# Patient Record
Sex: MALE | Race: BLACK OR AFRICAN AMERICAN | NOT HISPANIC OR LATINO | Employment: FULL TIME | URBAN - METROPOLITAN AREA
[De-identification: names, ages, dates, MRNs, and addresses within clinical notes are randomized per-mention and may not be internally consistent; named-entity substitution may affect disease eponyms.]

---

## 2017-03-08 ENCOUNTER — HOSPITAL ENCOUNTER (EMERGENCY)
Facility: HOSPITAL | Age: 52
Discharge: HOME | End: 2017-03-08
Attending: EMERGENCY MEDICINE
Payer: MEDICAID

## 2017-03-08 VITALS
SYSTOLIC BLOOD PRESSURE: 149 MMHG | WEIGHT: 225 LBS | OXYGEN SATURATION: 99 % | HEART RATE: 70 BPM | DIASTOLIC BLOOD PRESSURE: 89 MMHG | RESPIRATION RATE: 18 BRPM | HEIGHT: 73 IN | TEMPERATURE: 98 F | BODY MASS INDEX: 29.82 KG/M2

## 2017-03-08 DIAGNOSIS — E83.51 HYPOCALCEMIA: ICD-10-CM

## 2017-03-08 DIAGNOSIS — J06.9 VIRAL UPPER RESPIRATORY TRACT INFECTION WITH COUGH: Primary | ICD-10-CM

## 2017-03-08 DIAGNOSIS — A08.4 VIRAL GASTROENTERITIS: ICD-10-CM

## 2017-03-08 LAB
ALBUMIN SERPL BCP-MCNC: 4 G/DL
ALP SERPL-CCNC: 66 U/L
ALT SERPL W/O P-5'-P-CCNC: 22 U/L
ANION GAP SERPL CALC-SCNC: 7 MMOL/L
AST SERPL-CCNC: 29 U/L
BASOPHILS # BLD AUTO: 0.02 K/UL
BASOPHILS NFR BLD: 0.5 %
BILIRUB SERPL-MCNC: 0.7 MG/DL
BUN SERPL-MCNC: 15 MG/DL
CALCIUM SERPL-MCNC: 8.6 MG/DL
CHLORIDE SERPL-SCNC: 104 MMOL/L
CO2 SERPL-SCNC: 27 MMOL/L
CREAT SERPL-MCNC: 1.2 MG/DL
DIFFERENTIAL METHOD: ABNORMAL
EOSINOPHIL # BLD AUTO: 0 K/UL
EOSINOPHIL NFR BLD: 0.2 %
ERYTHROCYTE [DISTWIDTH] IN BLOOD BY AUTOMATED COUNT: 14.3 %
EST. GFR  (AFRICAN AMERICAN): >60 ML/MIN/1.73 M^2
EST. GFR  (NON AFRICAN AMERICAN): >60 ML/MIN/1.73 M^2
FLUAV AG SPEC QL IA: NEGATIVE
FLUBV AG SPEC QL IA: NEGATIVE
GLUCOSE SERPL-MCNC: 93 MG/DL
HCT VFR BLD AUTO: 39.6 %
HGB BLD-MCNC: 13.5 G/DL
LYMPHOCYTES # BLD AUTO: 0.9 K/UL
LYMPHOCYTES NFR BLD: 21.1 %
MCH RBC QN AUTO: 28 PG
MCHC RBC AUTO-ENTMCNC: 34.1 %
MCV RBC AUTO: 82 FL
MONOCYTES # BLD AUTO: 1.2 K/UL
MONOCYTES NFR BLD: 28.5 %
NEUTROPHILS # BLD AUTO: 2.1 K/UL
NEUTROPHILS NFR BLD: 49.5 %
PLATELET # BLD AUTO: 213 K/UL
PMV BLD AUTO: 11.4 FL
POTASSIUM SERPL-SCNC: 3.6 MMOL/L
PROT SERPL-MCNC: 6.8 G/DL
RBC # BLD AUTO: 4.82 M/UL
SODIUM SERPL-SCNC: 138 MMOL/L
SPECIMEN SOURCE: NORMAL
WBC # BLD AUTO: 4.21 K/UL

## 2017-03-08 PROCEDURE — 25000003 PHARM REV CODE 250: Performed by: PHYSICIAN ASSISTANT

## 2017-03-08 PROCEDURE — 99284 EMERGENCY DEPT VISIT MOD MDM: CPT

## 2017-03-08 PROCEDURE — 85025 COMPLETE CBC W/AUTO DIFF WBC: CPT

## 2017-03-08 PROCEDURE — 80053 COMPREHEN METABOLIC PANEL: CPT

## 2017-03-08 PROCEDURE — 87400 INFLUENZA A/B EACH AG IA: CPT | Mod: 59

## 2017-03-08 RX ORDER — NAPROXEN 500 MG/1
250 TABLET ORAL 2 TIMES DAILY WITH MEALS
Qty: 12 TABLET | Refills: 0 | Status: SHIPPED | OUTPATIENT
Start: 2017-03-08 | End: 2017-03-14

## 2017-03-08 RX ORDER — BENZONATATE 100 MG/1
100 CAPSULE ORAL
Status: COMPLETED | OUTPATIENT
Start: 2017-03-08 | End: 2017-03-08

## 2017-03-08 RX ORDER — ONDANSETRON 4 MG/1
4 TABLET, FILM COATED ORAL EVERY 6 HOURS PRN
Qty: 12 TABLET | Refills: 0 | Status: SHIPPED | OUTPATIENT
Start: 2017-03-08 | End: 2017-03-13

## 2017-03-08 RX ORDER — ONDANSETRON 4 MG/1
4 TABLET, ORALLY DISINTEGRATING ORAL
Status: COMPLETED | OUTPATIENT
Start: 2017-03-08 | End: 2017-03-08

## 2017-03-08 RX ORDER — IBUPROFEN 600 MG/1
600 TABLET ORAL
Status: COMPLETED | OUTPATIENT
Start: 2017-03-08 | End: 2017-03-08

## 2017-03-08 RX ORDER — ONDANSETRON 4 MG/1
4 TABLET, FILM COATED ORAL EVERY 6 HOURS PRN
Qty: 12 TABLET | Refills: 0 | Status: SHIPPED | OUTPATIENT
Start: 2017-03-08 | End: 2017-03-08 | Stop reason: CLARIF

## 2017-03-08 RX ORDER — BENZONATATE 100 MG/1
100 CAPSULE ORAL 3 TIMES DAILY PRN
Qty: 12 CAPSULE | Refills: 0 | Status: SHIPPED | OUTPATIENT
Start: 2017-03-08 | End: 2017-03-13

## 2017-03-08 RX ORDER — SODIUM CHLORIDE 9 MG/ML
1000 INJECTION, SOLUTION INTRAVENOUS
Status: COMPLETED | OUTPATIENT
Start: 2017-03-08 | End: 2017-03-08

## 2017-03-08 RX ADMIN — IBUPROFEN 600 MG: 600 TABLET, FILM COATED ORAL at 01:03

## 2017-03-08 RX ADMIN — BENZONATATE 100 MG: 100 CAPSULE ORAL at 02:03

## 2017-03-08 RX ADMIN — ONDANSETRON 4 MG: 4 TABLET, ORALLY DISINTEGRATING ORAL at 01:03

## 2017-03-08 RX ADMIN — SODIUM CHLORIDE 1000 ML: 0.9 INJECTION, SOLUTION INTRAVENOUS at 01:03

## 2017-03-08 NOTE — DISCHARGE INSTRUCTIONS
Viral Gastroenteritis (Adult)    Gastroenteritis is commonly called the stomach flu. It is most often caused by a virus that affects the stomach and intestinal tract and usually lasts from 2 to 7 days. Common viruses causing gastroenteritis include norovirus, rotavirus, and hepatitis A. Non-viral causes of gastroenteritis include bacteria, parasites, and toxins.  The danger from repeated vomiting or diarrhea is dehydration. This is the loss of too much fluid from the body. When this occurs, body fluids must be replaced. Antibiotics do not help with this illness because it is usually viral.Simple home treatment will be helpful.  Symptoms of viral gastroenteritis may include:  · Watery, loose stools  · Stomach pain or abdominal cramps  · Fever and chills  · Nausea and vomiting  · Loss of bowel control  · Headache  Home care  Gastroenteritis is transmitted by contact with the stool or vomit of an infected person. This can occur from person to person or from contact with a contaminated surface.  Follow these guidelines when caring for yourself at home:  · If symptoms are severe, rest at home for the next 24 hours or until you are feeling better.  · Wash your hands with soap and water or use alcohol-based  to prevent the spread of infection. Wash your hands after touching anyone who is sick.  · Wash your hands or use alcohol-based  after using the toilet and before meals. Clean the toilet after each use.  Remember these tips when preparing food:  · People with diarrhea should not prepare or serve food to others. When preparing foods, wash your hands before and after.  · Wash your hands after using cutting boards, countertops, knives, or utensils that have been in contact with raw food.  · Keep uncooked meats away from cooked and ready-to-eat foods.  Medicine  You may use acetaminophen or NSAID medicines like ibuprofen or naproxen to control fever unless another medicine was given. If you have  chronic liver or kidney disease, talk with your healthcare provider before using these medicines. Also talk with your provider if you've had a stomach ulcer or gastrointestinal bleeding. Don't give aspirin to anyone under 18 years of age who is ill with a fever. It may cause severe liver damage. Don't use NSAIDS is you are already taking one for another condition (like arthritis) or are on aspirin (such as for heart disease or after a stroke).  If medicine for vomiting or diarrhea are prescribed, take these only as directed. Do not take over-the-counter medicines for vomiting or diarrhea unless instructed by your healthcare provider.  Diet  Follow these guidelines for food:  · Water and liquids are important so you don't get dehydrated. Drink a small amount at a time or suck on ice chips if you are vomiting.  · If you eat, avoid fatty, greasy, spicy, or fried foods.  · Don't eat dairy if you have diarrhea. This can make diarrhea worse.  · Avoid tobacco, alcohol, and caffeine which may worsen symptoms.  During the first 24 hours (the first full day), follow the diet below:  · Beverages. Sports drinks, soft drinks without caffeine, ginger ale, mineral water (plain or flavored), decaffeinated tea and coffee. If you are very dehydrated, sports drinks aren't a good choice. They have too much sugar and not enough electrolytes. In this case, commercially available products called oral rehydration solutions, are best.  · Soups. Eat clear broth, consommé, and bouillon.  · Desserts. Eat gelatin, popsicles, and fruit juice bars.  During the next 24 hours (the second day), you may add the following to the above:  · Hot cereal, plain toast, bread, rolls, and crackers  · Plain noodles, rice, mashed potatoes, chicken noodle or rice soup  · Unsweetened canned fruit (avoid pineapple), bananas  · Limit fat intake to less than 15 grams per day. Do this by avoiding margarine, butter, oils, mayonnaise, sauces, gravies, fried foods,  peanut butter, meat, poultry, and fish.  · Limit fiber and avoid raw or cooked vegetables, fresh fruits (except bananas), and bran cereals.  · Limit caffeine and chocolate. Don't use spices or seasonings other than salt.  · Limit dairy products.  · Avoid alcohol.  During the next 24 hours:  · Gradually resume a normal diet as you feel better and your symptoms improve.  · If at any time it starts getting worse again, go back to clear liquids until you feel better.  Follow-up care  Follow up with your healthcare provider, or as advised. Call your provider if you don't get better within 24 hours or if diarrhea lasts more than a week. Also follow up if you are unable to keep down liquids and get dehydrated. If a stool (diarrhea) sample was taken, call as directed for the results.  Call 911  Call 911 if any of these occur:  · Trouble breathing  · Chest pain  · Confused  · Severe drowsiness or trouble awakening  · Fainting or loss of consciousness  · Rapid heart rate  · Seizure  · Stiff neck  When to seek medical advice  Call your healthcare provider right away if any of these occur:  · Abdominal pain that gets worse  · Continued vomiting (unable to keep liquids down)  · Frequent diarrhea (more than 5 times a day)  · Blood in vomit or stool (black or red color)  · Dark urine, reduced urine output, or extreme thirst  · Weakness or dizziness  · Drowsiness  · Fever of 100.4°F (38°C) oral or higher that does not get better with fever medicine  · New rash  Date Last Reviewed: 1/3/2016  © 8416-0242 [employer]. [address] [place] [address], [place] [address]. All rights reserved. This information is not intended as a substitute for professional medical care. Always follow your healthcare professional's instructions.          Viral Upper Respiratory Illness (Adult)  You have a viral upper respiratory illness (URI), which is another term for the common cold. This illness is contagious during the first few days. It is  spread through the air by coughing and sneezing. It may also be spread by direct contact (touching the sick person and then touching your own eyes, nose, or mouth). Frequent handwashing will decrease risk of spread. Most viral illnesses go away within 7 to 10 days with rest and simple home remedies. Sometimes the illness may last for several weeks. Antibiotics will not kill a virus, and they are generally not prescribed for this condition.    Home care  · If symptoms are severe, rest at home for the first 2 to 3 days. When you resume activity, don't let yourself get too tired.  · Avoid being exposed to cigarette smoke (yours or others).  · You may use acetaminophen or ibuprofen to control pain and fever, unless another medicine was prescribed. (Note: If you have chronic liver or kidney disease, have ever had a stomach ulcer or gastrointestinal bleeding, or are taking blood-thinning medicines, talk with your healthcare provider before using these medicines.) Aspirin should never be given to anyone under 18 years of age who is ill with a viral infection or fever. It may cause severe liver or brain damage.  · Your appetite may be poor, so a light diet is fine. Avoid dehydration by drinking 6 to 8 glasses of fluids per day (water, soft drinks, juices, tea, or soup). Extra fluids will help loosen secretions in the nose and lungs.  · Over-the-counter cold medicines will not shorten the length of time youre sick, but they may be helpful for the following symptoms: cough, sore throat, and nasal and sinus congestion. (Note: Do not use decongestants if you have high blood pressure.)  Follow-up care  Follow up with your healthcare provider, or as advised.  When to seek medical advice  Call your healthcare provider right away if any of these occur:  · Cough with lots of colored sputum (mucus)  · Severe headache; face, neck, or ear pain  · Difficulty swallowing due to throat pain  · Fever of 100.4°F (38°C)  Call 911, or get  immediate medical care  Call emergency services right away if any of these occur:  · Chest pain, shortness of breath, wheezing, or difficulty breathing  · Coughing up blood  · Inability to swallow due to throat pain  Date Last Reviewed: 9/13/2015 © 2000-2016 [email or web address]. [address] [place] [id number]. All rights reserved. This information is not intended as a substitute for professional medical care. Always follow your healthcare professional's instructions.        Hypocalcemia (Adult)  Hypocalcemia is too little calcium in the blood. Calcium is a mineral. It helps the heart and other muscles work well. Its also needed to grow and maintain strong bones and teeth.  Hypocalcemia may be caused by:  · Lack of calcium or vitamin D in your diet  · Digestive problems  · Gland problems  · Kidney or pancreas disease  · Low magnesium levels  · Too much phosphate in your blood  · Certain medicines  Hypocalcemia can cause the muscles of the face, hands, and feet to twitch without your control (spasm). It can also cause numbness or tingling around your mouth or in your hands and feet. Other problems may include depression and memory loss.  A blood sample will be taken to check your calcium level. The test also helps figure out if hypocalcemia may be caused by a problem with your kidneys, or with the gland that controls your calcium level (parathyroid gland). Depending on the cause, you may be given an oral calcium supplement. In severe cases, you may need a shot (injection) of calcium gluconate. You may also have a vitamin D shot or supplement. If low magnesium is the cause, you will have treatment to raise your bodys level of this mineral.  Home care  Your healthcare provider may have you take calcium and vitamin D supplements, or other medicines or minerals. Follow your providers instructions for taking these supplements.  General care  · Take any medicines or supplements as directed.  · Make diet  changes as instructed by your provider. You may be asked to eat more dairy products like milk, cheese, and yogurt.  · Avoid drinking sodas. Many of these have phosphates. These can interfere with your ability to absorb calcium.   · Try to get out in the sun for at least 20 minutes each day. Sun exposure helps your body make vitamin D. This helps you absorb calcium.  Follow-up care  Follow up as advised by your healthcare provider, or as advised.  When to seek medical advice  Call your healthcare provider right away if any of these occur:  · Extreme tiredness (fatigue)  · Irregular heartbeat  · Depression  · Seeing or hearing things that arent there (hallucinations)  · Muscle cramps, spasms, or twitching  · Numbness and tingling in the arms, legs, hands, or feet  · Seizures  Date Last Reviewed: 8/1/2016  © 1193-1499 [employer]. [address] [place] [address], [place] [address]. All rights reserved. This information is not intended as a substitute for professional medical care. Always follow your healthcare professional's instructions.

## 2017-03-08 NOTE — ED PROVIDER NOTES
Encounter Date: 3/8/2017    SCRIBE #1 NOTE: IRayray, am scribing for, and in the presence of,  Shannan Gonzalez PA-C . I have scribed the following portions of the note - Other sections scribed: HPI and ROS.       History     Chief Complaint   Patient presents with    Generalized Body Aches     Pt reports generalized body aches  and fever x 2 days.      Review of patient's allergies indicates:  No Known Allergies  HPI Comments: CC: Generalized Body Aches        HPI: This 51 y.o male pt with a medical Hx of HTN presents to the ED with c/o a x2 day history of flu like symptoms consisting of generalized body aches and weakness, subjective fever, chills, diaphoresis, headache, non-productive cough, emesis, and diarrhea. Additionally, pt complains of moderate bilateral lower quadrant abdominal pain present with emesis and subsiding afterwards. Pain is described as a crampy sensation. Pt denies sore throat, rhinorrhea, and congestion. Pt reports taking Nyquil and mucinex, without improvement. Pt has no known allergies. There are no alleviating factors. Symptoms are acute and severe(10/10).    The history is provided by the patient. No  was used.     Past Medical History:   Diagnosis Date    HTN (hypertension) 5/9/2013 2005    LBP radiating to right leg 5/9/2013    Smokes < 1 pack of cigarettes per day 5/9/2013    1/2 ppd     Past Surgical History:   Procedure Laterality Date    BACK SURGERY      c-spine fussion  2008     Family History   Problem Relation Age of Onset    Hypertension Mother      Social History   Substance Use Topics    Smoking status: Current Every Day Smoker     Packs/day: 1.00     Types: Cigarettes    Smokeless tobacco: None    Alcohol use No     Review of Systems   Constitutional: Positive for chills, diaphoresis and fever.   HENT: Negative for congestion, ear pain, rhinorrhea and sore throat.    Eyes: Negative for pain.   Respiratory: Positive for cough.  Negative for shortness of breath.    Cardiovascular: Negative for chest pain.   Gastrointestinal: Positive for abdominal pain (lower), diarrhea and vomiting. Negative for constipation and nausea.   Genitourinary: Negative for dysuria.   Musculoskeletal: Negative for back pain.   Skin: Negative for rash.   Neurological: Positive for weakness and headaches. Negative for dizziness and light-headedness.   Hematological: Does not bruise/bleed easily.       Physical Exam   Initial Vitals   BP Pulse Resp Temp SpO2   03/08/17 1134 03/08/17 1134 03/08/17 1134 03/08/17 1134 03/08/17 1134   166/89 89 18 99.7 °F (37.6 °C) 98 %     Physical Exam    Nursing note and vitals reviewed.  Constitutional: He appears well-developed and well-nourished. No distress.   HENT:   Head: Normocephalic and atraumatic.   Right Ear: External ear normal.   Left Ear: External ear normal.   Nose: Nose normal. Right sinus exhibits no maxillary sinus tenderness and no frontal sinus tenderness. Left sinus exhibits no maxillary sinus tenderness and no frontal sinus tenderness.   Mouth/Throat: Oropharynx is clear and moist. Mucous membranes are dry. No oropharyngeal exudate, posterior oropharyngeal edema or posterior oropharyngeal erythema.   +chapped lips   Eyes: Conjunctivae are normal.   Neck: Normal range of motion.   Cardiovascular: Normal rate and regular rhythm.   Pulmonary/Chest: Breath sounds normal. He has no wheezes. He has no rhonchi. He has no rales.   Abdominal: Soft. Bowel sounds are normal. He exhibits no distension. There is no tenderness. There is no rebound, no guarding, no tenderness at McBurney's point and negative Luz's sign.   Lymphadenopathy:     He has cervical adenopathy.   Neurological: He is alert.   Skin: Skin is warm and dry.   Psychiatric: He has a normal mood and affect.         ED Course   Procedures  Labs Reviewed   CBC W/ AUTO DIFFERENTIAL - Abnormal; Notable for the following:        Result Value    Hemoglobin 13.5  (*)     Hematocrit 39.6 (*)     Lymph # 0.9 (*)     Mono # 1.2 (*)     Mono% 28.5 (*)     All other components within normal limits   COMPREHENSIVE METABOLIC PANEL - Abnormal; Notable for the following:     Calcium 8.6 (*)     Anion Gap 7 (*)     All other components within normal limits   INFLUENZA A AND B ANTIGEN             Medical Decision Making:   Initial Assessment:   Patient is a 51-year-old male who presents for evaluation of symptoms for 2 days. Patient is afebrile in no acute distress, ill appearing. Vital signs stable. On exam, no abdominal tenderness or peritoneal signs. Negative mcburneys. Lungs clear to auscultation bilaterally. No meningeal signs. No sinus tenderness. Pt appears mildly dehydrated. Pt given IVF, tessalon, zofran and ibuprofen in ED. Flu swab negative. CBC remarkable for mild anemia. CMP remarkable for mild hypocalcemia. Upon re-evaluation, pt appearance is much improved and he states he feels much better. Pt discharged to home with symptomatic treatment-tessalon, zofran, and naproxen for viral syndrome. Supportive care and provided discharge instructions for hypocalcemia. PCP follow up. Return to ER if symptoms worsen, if cannot tolerate PO, if develop worsening abdominal pain, blood in vomit or stool or as needed.     I discussed this pt with Dr. Leslie who agrees with assessment and plan.   Differential Diagnosis:   Flu, Viral URI, Viral gastroenteritis, appendicitis, meningitis, PNA, sinusitis            Scribe Attestation:   Scribe #1: I performed the above scribed service and the documentation accurately describes the services I performed. I attest to the accuracy of the note.    Attending Attestation:           Physician Attestation for Scribe:  Physician Attestation Statement for Scribe #1: I, Shannan Gonzalez PA-C, reviewed documentation, as scribed by Rayray Nelson in my presence, and it is both accurate and complete.                 ED Course     Clinical Impression:   The  primary encounter diagnosis was Viral upper respiratory tract infection with cough. Diagnoses of Viral gastroenteritis and Hypocalcemia were also pertinent to this visit.    Disposition:   Disposition: Discharged  Condition: Stable       Shannan Gonzalez PA-C  03/08/17 [id number]

## 2017-03-08 NOTE — ED AVS SNAPSHOT
[facility]  [address] [place] [address]               Ramiro Ca   3/8/2017 11:51 AM   ED    Description:  Male : 1965   Department:  [facility]           Your Care was Coordinated By:     Provider Role From To    Vinicius Leslie MD Attending Provider [phone] --    Shannan Gonzalez PA-C Physician Assistant 17 [phone] --      Reason for Visit     Generalized Body Aches           Diagnoses this Visit        Comments    Viral upper respiratory tract infection with cough    -  Primary     Viral gastroenteritis         Hypocalcemia           ED Disposition     ED Disposition Condition Comment    Discharge             To Do List           Follow-up Information     Follow up with ITA Carpenter MD. Schedule an appointment as soon as possible for a visit in 2 days.    Specialty:  Internal Medicine    Why:  for follow up    Contact information:    [address]  [address]  [facility] [address]  [phone]          Go to [facility].    Specialty:  Emergency Medicine    Why:  As needed, If symptoms worsen    Contact information:    [address] [place] [id number] [phone]       These Medications        Disp Refills Start End    benzonatate (TESSALON) 100 MG capsule 12 capsule 0 3/8/2017 3/13/2017    Take 1 capsule (100 mg total) by mouth 3 (three) times daily as needed for Cough. - Oral    Pharmacy: [id number] - [place], [place] - [address] Ph #: [phone]       naproxen (NAPROSYN) 500 MG tablet 12 tablet 0 3/8/2017 3/14/2017    Take 0.5 tablets (250 mg total) by mouth 2 (two) times daily with meals. - Oral    Pharmacy: [id number] - [place], [place] - [address] Ph #: [phone]       ondansetron (ZOFRAN) 4 MG tablet 12 tablet 0 3/8/2017 3/13/2017    Take 1 tablet (4 mg total) by mouth every 6 (six) hours as needed. - Oral    Pharmacy: [facility]  [address] [address] [address]  #: [phone]         [facility]     [facility] - 24/7 Assistance  Registered nurses in the [facility] provide clinical advisement, health education, appointment booking, and other advisory services.  Call for this free service at [phone].             Medications           Message regarding Medications     Verify the changes and/or additions to your medication regime listed below are the same as discussed with your clinician today.  If any of these changes or additions are incorrect, please notify your healthcare provider.        START taking these NEW medications        Refills    benzonatate (TESSALON) 100 MG capsule 0    Sig: Take 1 capsule (100 mg total) by mouth 3 (three) times daily as needed for Cough.    Class: Print    Route: Oral    naproxen (NAPROSYN) 500 MG tablet 0    Sig: Take 0.5 tablets (250 mg total) by mouth 2 (two) times daily with meals.    Class: Print    Route: Oral    ondansetron (ZOFRAN) 4 MG tablet 0    Sig: Take 1 tablet (4 mg total) by mouth every 6 (six) hours as needed.    Class: Print    Route: Oral      These medications were administered today        Dose Freq    0.9%  NaCl infusion 1,000 mL ED 1 Time    Sig: Inject 1,000 mLs into the vein ED 1 Time.    Class: Normal    Route: Intravenous    Cosign for Ordering: Required by Vinicius Leslie MD    ondansetron disintegrating tablet 4 mg 4 mg ED 1 Time    Sig: Take 1 tablet (4 mg total) by mouth ED 1 Time.    Class: Normal    Route: Oral    Cosign for Ordering: Required by Vinicius Leslie MD    benzonatate capsule 100 mg 100 mg ED 1 Time    Sig: Take 1 capsule (100 mg total) by mouth ED 1 Time.    Class: Normal    Route: Oral    Cosign for Ordering: Required by Vinicius Leslie MD    ibuprofen tablet 600 mg 600 mg ED 1 Time    Sig: Take 1 tablet (600 mg total) by mouth ED 1 Time.    Class: Normal    Route: Oral    Cosign for Ordering: Required by Vinicius NORIEGA  "MD Mariama      STOP taking these medications     cyclobenzaprine (FLEXERIL) 10 MG tablet Take 5 mg by mouth 3 (three) times daily as needed.    hydrocodone-acetaminophen (VICODIN) 5-500 mg per tablet Take 1 tablet by mouth nightly as needed.    lisinopril-hydrochlorothiazide (PRINZIDE,ZESTORETIC) 20-25 mg Tab Take 1 tablet by mouth once daily.    pantoprazole (PROTONIX) 40 MG tablet Take 40 mg by mouth once daily.    varenicline (CHANTIX) 1 mg Tab Take 1 mg by mouth 2 (two) times daily.           Verify that the below list of medications is an accurate representation of the medications you are currently taking.  If none reported, the list may be blank. If incorrect, please contact your healthcare provider. Carry this list with you in case of emergency.           Current Medications     benzonatate (TESSALON) 100 MG capsule Take 1 capsule (100 mg total) by mouth 3 (three) times daily as needed for Cough.    naproxen (NAPROSYN) 500 MG tablet Take 0.5 tablets (250 mg total) by mouth 2 (two) times daily with meals.    ondansetron (ZOFRAN) 4 MG tablet Take 1 tablet (4 mg total) by mouth every 6 (six) hours as needed.           Clinical Reference Information           Your Vitals Were     BP Pulse Temp Resp Height Weight    143/80 (BP Location: Right arm, Patient Position: Sitting, BP Method: Automatic) 76 99.2 °F (37.3 °C) (Oral) 18 6' 1" (1.854 m) 102.1 kg (225 lb)    SpO2 BMI             95% 29.69 kg/m2         Allergies as of 3/8/2017     No Known Allergies      Immunizations Administered on Date of Encounter - 3/8/2017     None      ED Micro, Lab, POCT     Start Ordered       Status Ordering Provider    03/08/17 1259 03/08/17 1258  CBC auto differential  STAT      Final result     03/08/17 1259 03/08/17 1258  Comprehensive metabolic panel  STAT      Final result     03/08/17 1259 03/08/17 1258  Influenza antigen Nasopharyngeal Swab  STAT      Final result       ED Imaging Orders     None        Discharge Instructions  "          Viral Gastroenteritis (Adult)    Gastroenteritis is commonly called the stomach flu. It is most often caused by a virus that affects the stomach and intestinal tract and usually lasts from 2 to 7 days. Common viruses causing gastroenteritis include norovirus, rotavirus, and hepatitis A. Non-viral causes of gastroenteritis include bacteria, parasites, and toxins.  The danger from repeated vomiting or diarrhea is dehydration. This is the loss of too much fluid from the body. When this occurs, body fluids must be replaced. Antibiotics do not help with this illness because it is usually viral.Simple home treatment will be helpful.  Symptoms of viral gastroenteritis may include:  · Watery, loose stools  · Stomach pain or abdominal cramps  · Fever and chills  · Nausea and vomiting  · Loss of bowel control  · Headache  Home care  Gastroenteritis is transmitted by contact with the stool or vomit of an infected person. This can occur from person to person or from contact with a contaminated surface.  Follow these guidelines when caring for yourself at home:  · If symptoms are severe, rest at home for the next 24 hours or until you are feeling better.  · Wash your hands with soap and water or use alcohol-based  to prevent the spread of infection. Wash your hands after touching anyone who is sick.  · Wash your hands or use alcohol-based  after using the toilet and before meals. Clean the toilet after each use.  Remember these tips when preparing food:  · People with diarrhea should not prepare or serve food to others. When preparing foods, wash your hands before and after.  · Wash your hands after using cutting boards, countertops, knives, or utensils that have been in contact with raw food.  · Keep uncooked meats away from cooked and ready-to-eat foods.  Medicine  You may use acetaminophen or NSAID medicines like ibuprofen or naproxen to control fever unless another medicine was given. If you have  chronic liver or kidney disease, talk with your healthcare provider before using these medicines. Also talk with your provider if you've had a stomach ulcer or gastrointestinal bleeding. Don't give aspirin to anyone under 18 years of age who is ill with a fever. It may cause severe liver damage. Don't use NSAIDS is you are already taking one for another condition (like arthritis) or are on aspirin (such as for heart disease or after a stroke).  If medicine for vomiting or diarrhea are prescribed, take these only as directed. Do not take over-the-counter medicines for vomiting or diarrhea unless instructed by your healthcare provider.  Diet  Follow these guidelines for food:  · Water and liquids are important so you don't get dehydrated. Drink a small amount at a time or suck on ice chips if you are vomiting.  · If you eat, avoid fatty, greasy, spicy, or fried foods.  · Don't eat dairy if you have diarrhea. This can make diarrhea worse.  · Avoid tobacco, alcohol, and caffeine which may worsen symptoms.  During the first 24 hours (the first full day), follow the diet below:  · Beverages. Sports drinks, soft drinks without caffeine, ginger ale, mineral water (plain or flavored), decaffeinated tea and coffee. If you are very dehydrated, sports drinks aren't a good choice. They have too much sugar and not enough electrolytes. In this case, commercially available products called oral rehydration solutions, are best.  · Soups. Eat clear broth, consommé, and bouillon.  · Desserts. Eat gelatin, popsicles, and fruit juice bars.  During the next 24 hours (the second day), you may add the following to the above:  · Hot cereal, plain toast, bread, rolls, and crackers  · Plain noodles, rice, mashed potatoes, chicken noodle or rice soup  · Unsweetened canned fruit (avoid pineapple), bananas  · Limit fat intake to less than 15 grams per day. Do this by avoiding margarine, butter, oils, mayonnaise, sauces, gravies, fried foods,  peanut butter, meat, poultry, and fish.  · Limit fiber and avoid raw or cooked vegetables, fresh fruits (except bananas), and bran cereals.  · Limit caffeine and chocolate. Don't use spices or seasonings other than salt.  · Limit dairy products.  · Avoid alcohol.  During the next 24 hours:  · Gradually resume a normal diet as you feel better and your symptoms improve.  · If at any time it starts getting worse again, go back to clear liquids until you feel better.  Follow-up care  Follow up with your healthcare provider, or as advised. Call your provider if you don't get better within 24 hours or if diarrhea lasts more than a week. Also follow up if you are unable to keep down liquids and get dehydrated. If a stool (diarrhea) sample was taken, call as directed for the results.  Call 911  Call 911 if any of these occur:  · Trouble breathing  · Chest pain  · Confused  · Severe drowsiness or trouble awakening  · Fainting or loss of consciousness  · Rapid heart rate  · Seizure  · Stiff neck  When to seek medical advice  Call your healthcare provider right away if any of these occur:  · Abdominal pain that gets worse  · Continued vomiting (unable to keep liquids down)  · Frequent diarrhea (more than 5 times a day)  · Blood in vomit or stool (black or red color)  · Dark urine, reduced urine output, or extreme thirst  · Weakness or dizziness  · Drowsiness  · Fever of 100.4°F (38°C) oral or higher that does not get better with fever medicine  · New rash  Date Last Reviewed: 1/3/2016  © 8171-1731 [employer]. [address] [place] [address], [place] [address]. All rights reserved. This information is not intended as a substitute for professional medical care. Always follow your healthcare professional's instructions.          Viral Upper Respiratory Illness (Adult)  You have a viral upper respiratory illness (URI), which is another term for the common cold. This illness is contagious during the first few days. It is  spread through the air by coughing and sneezing. It may also be spread by direct contact (touching the sick person and then touching your own eyes, nose, or mouth). Frequent handwashing will decrease risk of spread. Most viral illnesses go away within 7 to 10 days with rest and simple home remedies. Sometimes the illness may last for several weeks. Antibiotics will not kill a virus, and they are generally not prescribed for this condition.    Home care  · If symptoms are severe, rest at home for the first 2 to 3 days. When you resume activity, don't let yourself get too tired.  · Avoid being exposed to cigarette smoke (yours or others).  · You may use acetaminophen or ibuprofen to control pain and fever, unless another medicine was prescribed. (Note: If you have chronic liver or kidney disease, have ever had a stomach ulcer or gastrointestinal bleeding, or are taking blood-thinning medicines, talk with your healthcare provider before using these medicines.) Aspirin should never be given to anyone under 18 years of age who is ill with a viral infection or fever. It may cause severe liver or brain damage.  · Your appetite may be poor, so a light diet is fine. Avoid dehydration by drinking 6 to 8 glasses of fluids per day (water, soft drinks, juices, tea, or soup). Extra fluids will help loosen secretions in the nose and lungs.  · Over-the-counter cold medicines will not shorten the length of time youre sick, but they may be helpful for the following symptoms: cough, sore throat, and nasal and sinus congestion. (Note: Do not use decongestants if you have high blood pressure.)  Follow-up care  Follow up with your healthcare provider, or as advised.  When to seek medical advice  Call your healthcare provider right away if any of these occur:  · Cough with lots of colored sputum (mucus)  · Severe headache; face, neck, or ear pain  · Difficulty swallowing due to throat pain  · Fever of 100.4°F (38°C)  Call 911, or get  immediate medical care  Call emergency services right away if any of these occur:  · Chest pain, shortness of breath, wheezing, or difficulty breathing  · Coughing up blood  · Inability to swallow due to throat pain  Date Last Reviewed: 9/13/2015 © 2000-2016 [employer]. [address] [place] [address]. All rights reserved. This information is not intended as a substitute for professional medical care. Always follow your healthcare professional's instructions.        Hypocalcemia (Adult)  Hypocalcemia is too little calcium in the blood. Calcium is a mineral. It helps the heart and other muscles work well. Its also needed to grow and maintain strong bones and teeth.  Hypocalcemia may be caused by:  · Lack of calcium or vitamin D in your diet  · Digestive problems  · Gland problems  · Kidney or pancreas disease  · Low magnesium levels  · Too much phosphate in your blood  · Certain medicines  Hypocalcemia can cause the muscles of the face, hands, and feet to twitch without your control (spasm). It can also cause numbness or tingling around your mouth or in your hands and feet. Other problems may include depression and memory loss.  A blood sample will be taken to check your calcium level. The test also helps figure out if hypocalcemia may be caused by a problem with your kidneys, or with the gland that controls your calcium level (parathyroid gland). Depending on the cause, you may be given an oral calcium supplement. In severe cases, you may need a shot (injection) of calcium gluconate. You may also have a vitamin D shot or supplement. If low magnesium is the cause, you will have treatment to raise your bodys level of this mineral.  Home care  Your healthcare provider may have you take calcium and vitamin D supplements, or other medicines or minerals. Follow your providers instructions for taking these supplements.  General care  · Take any medicines or supplements as directed.  · Make diet  changes as instructed by your provider. You may be asked to eat more dairy products like milk, cheese, and yogurt.  · Avoid drinking sodas. Many of these have phosphates. These can interfere with your ability to absorb calcium.   · Try to get out in the sun for at least 20 minutes each day. Sun exposure helps your body make vitamin D. This helps you absorb calcium.  Follow-up care  Follow up as advised by your healthcare provider, or as advised.  When to seek medical advice  Call your healthcare provider right away if any of these occur:  · Extreme tiredness (fatigue)  · Irregular heartbeat  · Depression  · Seeing or hearing things that arent there (hallucinations)  · Muscle cramps, spasms, or twitching  · Numbness and tingling in the arms, legs, hands, or feet  · Seizures  Date Last Reviewed: 8/1/2016  © 8910-6086 [employer]. [address] [place] [address]. All rights reserved. This information is not intended as a substitute for professional medical care. Always follow your healthcare professional's instructions.          [facility] Sign-Up     Activating your [facility] account is as easy as 1-2-3!     1) Visit [email or web address], select Sign Up Now, enter this activation code and your date of birth, then select Next.  [email or web address]  Expires: 4/22/2017  2:47 PM      2) Create a username and password to use when you visit [facility] in the future and select a security question in case you lose your password and select Next.    3) Enter your e-mail address and click Sign Up!    Additional Information  If you have questions, please e-mail [email or web address] or call [phone] to talk to our [facility] staff. Remember, [facility] is NOT to be used for urgent needs. For medical emergencies, dial 911.          [facility] complies with applicable Federal civil rights laws and does not discriminate on the basis of race, color, national origin, age, disability, or sex.         Language Assistance Services     ATTENTION: Language assistance services are available, free of charge. Please call [phone].      ATENCIÓN: Si habla christiañol, tiene a chaparro disposición servicios gratuitos de asistencia lingüística. Llame al [phone].     CHÚ Ý: N?u b?n nói Ti?ng Vi?t, có các d?ch v? h? tr? ngôn ng? mi?n phí dành cho b?n. G?i s? [phone].

## 2017-03-08 NOTE — ED TRIAGE NOTES
C/o body aches, vomiting, diarrhea,  chills, HA, sore throat, runny nose, cough, abd pain x 1 day.  No vomiting today. Diarrhea x 1 today. No OTC meds taken.

## 2017-04-29 ENCOUNTER — HOSPITAL ENCOUNTER (EMERGENCY)
Facility: OTHER | Age: 52
Discharge: HOME | End: 2017-04-29
Attending: INTERNAL MEDICINE
Payer: MEDICAID

## 2017-04-29 VITALS
HEIGHT: 73 IN | DIASTOLIC BLOOD PRESSURE: 102 MMHG | BODY MASS INDEX: 29.16 KG/M2 | SYSTOLIC BLOOD PRESSURE: 172 MMHG | WEIGHT: 220 LBS | RESPIRATION RATE: 22 BRPM | OXYGEN SATURATION: 98 % | HEART RATE: 81 BPM | TEMPERATURE: 98 F

## 2017-04-29 DIAGNOSIS — I10 ESSENTIAL HYPERTENSION: ICD-10-CM

## 2017-04-29 DIAGNOSIS — S16.1XXA CERVICAL STRAIN, INITIAL ENCOUNTER: Primary | ICD-10-CM

## 2017-04-29 PROCEDURE — 25000003 PHARM REV CODE 250: Performed by: INTERNAL MEDICINE

## 2017-04-29 PROCEDURE — 99283 EMERGENCY DEPT VISIT LOW MDM: CPT

## 2017-04-29 RX ORDER — LISINOPRIL 20 MG/1
20 TABLET ORAL DAILY
Qty: 90 TABLET | Refills: 3 | Status: SHIPPED | OUTPATIENT
Start: 2017-04-29 | End: 2021-05-17 | Stop reason: SDUPTHER

## 2017-04-29 RX ORDER — IBUPROFEN 800 MG/1
800 TABLET ORAL EVERY 8 HOURS PRN
Qty: 20 TABLET | Refills: 0 | Status: SHIPPED | OUTPATIENT
Start: 2017-04-29 | End: 2017-04-29

## 2017-04-29 RX ORDER — LISINOPRIL 20 MG/1
40 TABLET ORAL
Status: COMPLETED | OUTPATIENT
Start: 2017-04-29 | End: 2017-04-29

## 2017-04-29 RX ORDER — IBUPROFEN 800 MG/1
800 TABLET ORAL EVERY 8 HOURS PRN
Qty: 20 TABLET | Refills: 0 | Status: SHIPPED | OUTPATIENT
Start: 2017-04-29 | End: 2018-12-21

## 2017-04-29 RX ORDER — CLONIDINE HYDROCHLORIDE 0.1 MG/1
0.2 TABLET ORAL
Status: COMPLETED | OUTPATIENT
Start: 2017-04-29 | End: 2017-04-29

## 2017-04-29 RX ORDER — LISINOPRIL 20 MG/1
20 TABLET ORAL DAILY
Qty: 90 TABLET | Refills: 3 | Status: SHIPPED | OUTPATIENT
Start: 2017-04-29 | End: 2017-04-29

## 2017-04-29 RX ORDER — IBUPROFEN 400 MG/1
800 TABLET ORAL
Status: COMPLETED | OUTPATIENT
Start: 2017-04-29 | End: 2017-04-29

## 2017-04-29 RX ADMIN — LISINOPRIL 40 MG: 20 TABLET ORAL at 09:04

## 2017-04-29 RX ADMIN — CLONIDINE HYDROCHLORIDE 0.2 MG: 0.1 TABLET ORAL at 09:04

## 2017-04-29 RX ADMIN — IBUPROFEN 800 MG: 400 TABLET, FILM COATED ORAL at 09:04

## 2017-04-29 NOTE — ED AVS SNAPSHOT
[facility] EMERGENCY DEPARTMENT  [address] [place] [address]               Ramiro Ca   2017  9:13 PM   ED    Description:  Male : 1965   Department:  [facility] Emergency Department           Your Care was Coordinated By:     Provider Role From To    Manav Carpenter MD Attending Provider [phone] --      Reason for Visit     Neck Pain           Diagnoses this Visit        Comments    Cervical strain, initial encounter    -  Primary     Essential hypertension           ED Disposition     ED Disposition Condition Comment    Discharge             To Do List           Follow-up Information     Follow up with ITA Carpenter MD In 3 day(s).    Specialty:  Internal Medicine    Contact information:    [address] [facility]  [address]  [facility] [address]  [phone]         These Medications        Disp Refills Start End    ibuprofen (ADVIL,MOTRIN) 800 MG tablet 20 tablet 0 2017     Take 1 tablet (800 mg total) by mouth every 8 (eight) hours as needed for Pain. - Oral    Pharmacy: [id number] - [place], [place] - [address] Ph #: [phone]       lisinopril (PRINIVIL,ZESTRIL) 20 MG tablet 90 tablet 3 2017    Take 1 tablet (20 mg total) by mouth once daily. - Oral    Pharmacy: [facility] - [place], [place] - [address] Ph #: [phone]         [facility]     [facility] Care [facility] - 24/7 Assistance  Unless otherwise directed by your provider, please contact [facility], our nurse care line that is available for 24/7 assistance.     Registered nurses in the [facility] provide: appointment scheduling, clinical advisement, health education, and other advisory services.  Call: [phone] (toll free)               Medications           Message regarding Medications     Verify the changes and/or additions to your medication regime listed below are the same as discussed with your clinician today.   "If any of these changes or additions are incorrect, please notify your healthcare provider.        START taking these NEW medications        Refills    ibuprofen (ADVIL,MOTRIN) 800 MG tablet 0    Sig: Take 1 tablet (800 mg total) by mouth every 8 (eight) hours as needed for Pain.    Class: Normal    Route: Oral    lisinopril (PRINIVIL,ZESTRIL) 20 MG tablet 3    Sig: Take 1 tablet (20 mg total) by mouth once daily.    Class: Normal    Route: Oral      These medications were administered today        Dose Freq    cloNIDine tablet 0.2 mg 0.2 mg ED 1 Time    Sig: Take 2 tablets (0.2 mg total) by mouth ED 1 Time.    Class: Normal    Route: Oral    lisinopril tablet 40 mg 40 mg ED 1 Time    Sig: Take 2 tablets (40 mg total) by mouth ED 1 Time.    Class: Normal    Route: Oral    ibuprofen tablet 800 mg 800 mg ED 1 Time    Sig: Take 2 tablets (800 mg total) by mouth ED 1 Time.    Class: Normal    Route: Oral           Verify that the below list of medications is an accurate representation of the medications you are currently taking.  If none reported, the list may be blank. If incorrect, please contact your healthcare provider. Carry this list with you in case of emergency.           Current Medications     ibuprofen (ADVIL,MOTRIN) 800 MG tablet Take 1 tablet (800 mg total) by mouth every 8 (eight) hours as needed for Pain.    lisinopril (PRINIVIL,ZESTRIL) 20 MG tablet Take 1 tablet (20 mg total) by mouth once daily.    lisinopril tablet 40 mg Take 2 tablets (40 mg total) by mouth ED 1 Time.           Clinical Reference Information           Your Vitals Were     BP Pulse Temp Resp Height Weight    178/121 (BP Location: Left arm, Patient Position: Sitting) 81 98.4 °F (36.9 °C) (Temporal) 22 6' 1" (1.854 m) 99.8 kg (220 lb)    SpO2 BMI             98% 29.03 kg/m2         Allergies as of 4/29/2017     No Known Allergies      Immunizations Administered on Date of Encounter - 4/29/2017     None      ED Micro, Lab, POCT     None "      ED Imaging Orders     None        Discharge Instructions           Neck Sprain or Strain  A sudden force that causes turning or bending of the neck can cause sprain or strain. An example would be the force from a car accident. This can stretch or tear muscles called a strain. It can also stretch or tear ligaments called a sprain. Either of these can cause neck pain. Sometimes neck pain occurs after a simple awkward movement. In either case, muscle spasm is commonly present and contributes to the pain.    Unless you had a forceful physical injury (for example, a car accident or fall), X-rays are usually not ordered for the initial evaluation of neck pain. If pain continues and dose not respond to medical treatment, X-rays and other tests may be performed at a later time.  Home care  · You may feel more soreness and spasm the first few days after the injury. Rest until symptoms begin to improve.  · When lying down, use a comfortable pillow or a rolled towel that supports the head and keeps the spine in a neutral position. The position of the head should not be tilted forward or backward.  · Apply an ice pack over the injured area for 15 to 20 minutes every 3 to 6 hours. You should do this for the first 24 to 48 hours. You can make an ice pack by filling a plastic bag that seals at the top with ice cubes and then wrapping it with a thin towel. After 48 hours, apply heat (warm shower or warm bath) for 15 to 20 minutes several times a day, or alternate ice and heat.  · You may use over-the-counter pain medicine to control pain, unless another pain medicine was prescribed. If you have chronic liver or kidney disease or ever had a stomach ulcer or GI bleeding, talk with your healthcare provider before using these medicines.  · If a soft cervical collar was prescribed, it should be worn only for periods of increased pain. It should not be worn for more than 3 hours a day, or for a period longer than 1 to 2  weeks.  Follow-up care  Follow up with your healthcare provider as directed. Physical therapy may be needed.  Sometimes fractures dont show up on the first X-ray. Bruises and sprains can sometimes hurt as much as a fracture. These injuries can take time to heal completely. If your symptoms dont improve or they get worse, talk with your healthcare provider. You may need a repeat X-ray or other tests. If X-rays were taken, you will be told of any new findings that may affect your care.  Call 911  Call 911 if you have:  · Neck swelling, difficulty or painful swallowing  · Difficulty breathing  · Chest pain  When to seek medical advice  Call your healthcare provider right away if any of these occur:  · Pain becomes worse or spreads into your arms  · Weakness or numbness in one or both arms  Date Last Reviewed: 11/19/2015  © 4743-4496 [employer]. [address] [place] [address]. All rights reserved. This information is not intended as a substitute for professional medical care. Always follow your healthcare professional's instructions.          [facility] Sign-Up     Activating your [facility] account is as easy as 1-2-3!     1) Visit [email or web address], select Sign Up Now, enter this activation code and your date of birth, then select Next.  [email or web address]  Expires: 6/13/2017 10:37 PM      2) Create a username and password to use when you visit [facility] in the future and select a security question in case you lose your password and select Next.    3) Enter your e-mail address and click Sign Up!    Additional Information  If you have questions, please e-mail [email or web address] or call [phone] to talk to our [facility] staff. Remember, [facility] is NOT to be used for urgent needs. For medical emergencies, dial 911.         Smoking Cessation     If you would like to quit smoking:   You may be eligible for free services if you are a [place] resident and started smoking cigarettes before  September 1, 1988.  Call the Smoking Cessation Trust (SCT) toll free at (434) 672-0880 or (710) 721-4339.   Call 1-800-QUIT-NOW if you do not meet the above criteria.   Contact us via email: [email or web address]   View our website for more information: [email or web address]         [facility] complies with applicable Federal civil rights laws and does not discriminate on the basis of race, color, national origin, age, disability, or sex.        Language Assistance Services     ATTENTION: Language assistance services are available, free of charge. Please call [phone].      ATENCIÓN: Si habla español, tiene a chaparro disposición servicios gratuitos de asistencia lingüística. Llame al [phone].     CHÚ Ý: N?u b?n nói Ti?ng Vi?t, có các d?ch v? h? tr? ngôn ng? mi?n phí dành cho b?n. G?i s? [phone].

## 2017-04-30 NOTE — ED TRIAGE NOTES
Pt presents to ER with c/o rt sided neck pain and burning to rt arm for a month,  He also admits to not taking his blood pressure medication for several weeks.  Denies chest pain or sob.

## 2017-04-30 NOTE — DISCHARGE INSTRUCTIONS
Neck Sprain or Strain  A sudden force that causes turning or bending of the neck can cause sprain or strain. An example would be the force from a car accident. This can stretch or tear muscles called a strain. It can also stretch or tear ligaments called a sprain. Either of these can cause neck pain. Sometimes neck pain occurs after a simple awkward movement. In either case, muscle spasm is commonly present and contributes to the pain.    Unless you had a forceful physical injury (for example, a car accident or fall), X-rays are usually not ordered for the initial evaluation of neck pain. If pain continues and dose not respond to medical treatment, X-rays and other tests may be performed at a later time.  Home care  · You may feel more soreness and spasm the first few days after the injury. Rest until symptoms begin to improve.  · When lying down, use a comfortable pillow or a rolled towel that supports the head and keeps the spine in a neutral position. The position of the head should not be tilted forward or backward.  · Apply an ice pack over the injured area for 15 to 20 minutes every 3 to 6 hours. You should do this for the first 24 to 48 hours. You can make an ice pack by filling a plastic bag that seals at the top with ice cubes and then wrapping it with a thin towel. After 48 hours, apply heat (warm shower or warm bath) for 15 to 20 minutes several times a day, or alternate ice and heat.  · You may use over-the-counter pain medicine to control pain, unless another pain medicine was prescribed. If you have chronic liver or kidney disease or ever had a stomach ulcer or GI bleeding, talk with your healthcare provider before using these medicines.  · If a soft cervical collar was prescribed, it should be worn only for periods of increased pain. It should not be worn for more than 3 hours a day, or for a period longer than 1 to 2 weeks.  Follow-up care  Follow up with your healthcare provider as directed.  Physical therapy may be needed.  Sometimes fractures dont show up on the first X-ray. Bruises and sprains can sometimes hurt as much as a fracture. These injuries can take time to heal completely. If your symptoms dont improve or they get worse, talk with your healthcare provider. You may need a repeat X-ray or other tests. If X-rays were taken, you will be told of any new findings that may affect your care.  Call 911  Call 911 if you have:  · Neck swelling, difficulty or painful swallowing  · Difficulty breathing  · Chest pain  When to seek medical advice  Call your healthcare provider right away if any of these occur:  · Pain becomes worse or spreads into your arms  · Weakness or numbness in one or both arms  Date Last Reviewed: 11/19/2015  © 9499-4562 [employer]. [address] [place] [address], [place] [address]. All rights reserved. This information is not intended as a substitute for professional medical care. Always follow your healthcare professional's instructions.
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
## 2017-05-19 PROCEDURE — 99283 EMERGENCY DEPT VISIT LOW MDM: CPT

## 2017-05-20 ENCOUNTER — HOSPITAL ENCOUNTER (EMERGENCY)
Facility: OTHER | Age: 52
Discharge: HOME | End: 2017-05-20
Attending: EMERGENCY MEDICINE
Payer: MEDICAID

## 2017-05-20 VITALS
HEART RATE: 77 BPM | TEMPERATURE: 99 F | SYSTOLIC BLOOD PRESSURE: 150 MMHG | DIASTOLIC BLOOD PRESSURE: 84 MMHG | OXYGEN SATURATION: 99 % | RESPIRATION RATE: 18 BRPM

## 2017-05-20 DIAGNOSIS — I10 ESSENTIAL HYPERTENSION: ICD-10-CM

## 2017-05-20 DIAGNOSIS — M62.838 NECK MUSCLE SPASM: Primary | ICD-10-CM

## 2017-05-20 DIAGNOSIS — Z72.0 TOBACCO ABUSE: ICD-10-CM

## 2017-05-20 PROCEDURE — 25000003 PHARM REV CODE 250: Performed by: EMERGENCY MEDICINE

## 2017-05-20 RX ORDER — CLONIDINE HYDROCHLORIDE 0.1 MG/1
0.2 TABLET ORAL
Status: COMPLETED | OUTPATIENT
Start: 2017-05-20 | End: 2017-05-20

## 2017-05-20 RX ORDER — DIAZEPAM 5 MG/1
10 TABLET ORAL EVERY 6 HOURS PRN
Qty: 15 TABLET | Refills: 0 | Status: SHIPPED | OUTPATIENT
Start: 2017-05-20 | End: 2021-06-05

## 2017-05-20 RX ORDER — DIAZEPAM 5 MG/1
10 TABLET ORAL
Status: COMPLETED | OUTPATIENT
Start: 2017-05-20 | End: 2017-05-20

## 2017-05-20 RX ORDER — HYDROCODONE BITARTRATE AND ACETAMINOPHEN 5; 325 MG/1; MG/1
2 TABLET ORAL
Status: COMPLETED | OUTPATIENT
Start: 2017-05-20 | End: 2017-05-20

## 2017-05-20 RX ORDER — HYDROCODONE BITARTRATE AND ACETAMINOPHEN 5; 325 MG/1; MG/1
1 TABLET ORAL EVERY 4 HOURS PRN
Qty: 10 TABLET | Refills: 0 | Status: SHIPPED | OUTPATIENT
Start: 2017-05-20 | End: 2017-05-30

## 2017-05-20 RX ADMIN — HYDROCODONE BITARTRATE AND ACETAMINOPHEN 2 TABLET: 5; 325 TABLET ORAL at 12:05

## 2017-05-20 RX ADMIN — DIAZEPAM 10 MG: 5 TABLET ORAL at 12:05

## 2017-05-20 RX ADMIN — CLONIDINE HYDROCHLORIDE 0.2 MG: 0.1 TABLET ORAL at 12:05

## 2017-05-20 NOTE — ED PROVIDER NOTES
Encounter Date: 5/19/2017       History     Chief Complaint   Patient presents with    Neck Pain     x 2 weeks, seen here previously for same complaint. No trauma or additional injury.      Review of patient's allergies indicates:  No Known Allergies  The history is provided by the patient and medical records.   52 -year-old who complains of pain to his right lateral neck for the last 2 weeks.  Patient denies trauma.  He has throbbing pain that worsens when he turns his head.  Patient has been taking ibuprofen 800 mg 2 times a day up until 2 days ago when he ran out.  He been seen here for similar symptoms on April 29.  Patient did not follow-up with his doctor.  He denies chest pain or shortness of breath.  Patient's blood pressure was elevated on his initial visit and was also elevated again today.  Patient says he's been compliant with his medication.  Patient continues to smoke despite being advised not to do this on his last visit.  He also has not followed up with his primary care physician.  Past Medical History:   Diagnosis Date    HTN (hypertension) 5/9/2013 2005    LBP radiating to right leg 5/9/2013    Smokes < 1 pack of cigarettes per day 5/9/2013    1/2 ppd     Past Surgical History:   Procedure Laterality Date    BACK SURGERY      c-spine fussion  2008     Family History   Problem Relation Age of Onset    Hypertension Mother      Social History   Substance Use Topics    Smoking status: Current Every Day Smoker     Packs/day: 1.00     Types: Cigarettes    Smokeless tobacco: None    Alcohol use No     Review of Systems   Constitutional: Negative for fever.   HENT: Negative for sore throat.    Eyes: Negative for pain and visual disturbance.   Respiratory: Negative for shortness of breath.    Cardiovascular: Negative for chest pain.   Gastrointestinal: Negative for abdominal pain.   Genitourinary: Negative for dysuria.   Musculoskeletal: Positive for neck pain. Negative for back pain.   Skin:  Negative for rash.   Neurological: Negative for headaches.       Physical Exam   Initial Vitals   BP Pulse Resp Temp SpO2   05/20/17 0012 05/20/17 0012 05/20/17 0012 05/20/17 0012 05/20/17 0012   163/129 77 18 99.1 °F (37.3 °C) 99 %     Physical Exam    Nursing note and vitals reviewed.  Constitutional: He appears well-developed and well-nourished.   HENT:   Head: Normocephalic and atraumatic.   Eyes: EOM are normal. Pupils are equal, round, and reactive to light.   Neck: Normal range of motion. Neck supple.       Cardiovascular: Normal rate and regular rhythm.   Pulmonary/Chest: Breath sounds normal.   Abdominal: Soft. There is no rebound and no guarding.   Musculoskeletal: Normal range of motion. He exhibits no edema or tenderness.   Neurological: He is alert and oriented to person, place, and time. He has normal strength. No cranial nerve deficit or sensory deficit.   Skin: Skin is warm and dry.   Psychiatric: He has a normal mood and affect.         ED Course   Procedures  Labs Reviewed - No data to display          Medical Decision Making:   Initial Assessment:   52-year-old presents with pain to the right side of his neck.  Patient denies trauma but said that he had surgery to his neck years ago.  On exam patient does have tenderness to the right lateral neck.  No neurological deficits.  Blood pressure is 163/129.  ED Management:  Patient has no evidence of end organ damage.  He has had surgery on his neck previously and this pain has been ongoing for 2 weeks.  He denies chest pain or shortness of breath.  Patient was treated with Toradol and Valium in the ER and also given clonidine for blood pressure with improvement of all of his symptoms.  A long discussion with the patient regarding the need to follow-up with his primary care physician as well as to stop smoking.  He was educated on the risk of stroke, kidney failure and coronary artery disease associated with high blood pressure and  smoking.    Additional MDM:   Smoking Cessation: The patient is a smoker. The patient was counseled on smoking cessation for: 5 minutes. The patient was counseled on tobacco related  health complications. The patient was referred to a tobacco treatment program.                 ED Course     Clinical Impression:   The primary encounter diagnosis was Neck muscle spasm. Diagnoses of Essential hypertension and Tobacco abuse were also pertinent to this visit.          Ambika Beth MD  05/20/17 0514

## 2017-05-20 NOTE — DISCHARGE INSTRUCTIONS
The Benefits of Living Smoke Free  What do you want to gain from quitting? Check off some reasons to quit.  Health benefits  ___  Improve my ability to breathe without coughing or shortness of breath  ___  Reduce my risk of lung cancer, heart disease, chronic lung disease  ___  Have fewer wrinkles and softer skin  ___  Improve my sense of taste and smell  ___  For pregnant women--reduce the risk of having a miscarriage, stillbirth, premature birth, or low-birth-weight baby  Personal benefits  ___  Feel more in control of my life  ___  Have better-smelling hair, breath, clothes, home, and car  ___  Save time by not having to take smoke breaks, buy cigarettes, or hunt for a light  ___  Have whiter teeth  Family benefits  ___  Reduce my childrens respiratory tract infections  ___  Set a good example for my children  ___  Reduce my familys cancer risk  Financial benefits  ___  Save hundreds of dollars each year that would be spent on cigarettes  ___  Save money on medical bills  ___  Save on life, health, and car insurance premiums     Those dollars add up!  Cigarettes are expensive, and getting more expensive all the time. Do you realize how much money you are spending on cigarettes per year? What is the average amount you spend on a pack of cigarettes? What is the average number of packs that you smoke per day? Using your answers to these questions, fill in this formula to help you find out:  ($ _____ per pack) ×  ( _____ number of packs per day) × (365 days) =  $ _____ yearly cost of smoking  Besides tobacco, there are other costs, including extra cleaning bills and replacement costs for clothing and furniture; medical expenses for smoking-related illnesses; and higher health, life, and car insurance premiums.  Cigars and pipes count too!  Cigars and pipes are also dangerous. So are smokeless (chewing) tobacco and snuff. All of these products contain nicotine, a highly addictive substance that has harmful  effects on your body. Quitting smoking means giving up all tobacco products.      For more information  · smokefree.gov/wfft-tf-lu-expert  · [employer] Smoking Quitline: 877-44U-QUIT (348-934-9764)   Date Last Reviewed: 11/18/2014  © 2499-0927 [employer]. [address] [place] [address]. All rights reserved. This information is not intended as a substitute for professional medical care. Always follow your healthcare professional's instructions.        DO NOT SMOKE  Out of Control High Blood Pressure (Established)    Your blood pressure was unusually high today. This can occur if youve missed doses of your blood pressure medicine. Or it can happen if you are taking other medicines. These include some asthma inhalers, decongestants, diet pills, and street drugs like cocaine and amphetamine.  Other causes include:  · Weight gain  · More salt in your diet  · Smoking  · Caffeine  Your blood pressure can also rise if you are emotionally upset or in intense pain. It may go back to normal after a period of rest.  A blood pressure reading is made up of 2 numbers. There is a top number over a bottom number. The top number is the systolic pressure. The bottom number is the diastolic pressure. A normal blood pressure is less than 120 over less than 80. High blood pressure (hypertension) is when the top number is 140 or higher. Or it is when the bottom number is 90 or higher. To be high blood pressure, the numbers must be higher when tested over a period of time. The blood pressures between normal and hypertension are called prehypertension.  Home care  Its important to take steps to lower your blood pressure. If you are taking blood pressure medicine, the guidelines below may help you need less or no medicines in the future.  · Begin a weight-loss program if you are overweight.  · Cut back on the amount of salt in your diet:  ¨ Avoid high-salt foods like olives, pickles, smoked meats,  and salted potato chips.  ¨ Dont add salt to your food at the table.  ¨ Use only small amounts of salt when cooking.  · Begin an exercise program. Talk with your health care provider about what exercise program is best for you. It doesnt have to be difficult. Even brisk walking for 20 minutes 3 times a week is a good form of exercise.  · Avoid medicines that have heart stimulants in them. This includes many cold and sinus decongestant pills and sprays, as well as diet pills. Check the warnings about hypertension on the label. Stimulants such as amphetamine or cocaine could be lethal for someone with hypertension. Never take these.  · Limit how much caffeine you drink. Or switch to noncaffeinated beverages.  · Stop smoking. If you are a long-time smoker, this can be hard. Enroll in a stop-smoking program to make it more likely that you will succeed. Talk with your provider about ways to quit.  · Learn how to handle stress better. This is an important part of any program to lower blood pressure. Learn ways to relax. These include meditation, yoga, and biofeedback.  · If medicines were prescribed, take them exactly as directed. Missing doses may cause your blood pressure to get out of control.  · Consider buying an automatic blood pressure machine. These are available at many drugstores. Use this to monitor your blood pressure. Report the results to your provider.  Follow-up care  Regular visits to your own health care provider for blood pressure and medicine checks are an important part of your care. Make a follow-up appointment as directed.  When to seek medical advice  Call your health care provider right away if any of these occur:  · Chest, arm, shoulder, neck, or upper back pain  · Shortness of breath  · Severe headache  · Throbbing or rushing sound in the ears  · Nosebleed  · Extreme drowsiness, confusion, or fainting  · Dizziness or dizziness with spinning sensation (vertigo)  · Weakness in an arm or leg or  on one side of the face  · Difficulty speaking or seeing   Date Last Reviewed: 11/25/2014  © 0556-5836 [employer]. [address] [place] [address], [place] [address]. All rights reserved. This information is not intended as a substitute for professional medical care. Always follow your healthcare professional's instructions.          Neck Spasm    A spasm of the neck muscles can happen after a sudden awkward neck movement. Sleeping with your neck in a crooked position can also cause spasm. Some people respond to emotional stress by tensing the muscles of their neck, shoulders, and upper back. If neck spasm lasts long enough, it can cause headache.  The treatment described below will usually help the pain to go away in 5 to 7 days. Pain that continues may need further evaluation or other types of treatment such as physical therapy.  Home care  · Rest and relax the muscles. Use a comfortable pillow that supports the head and keeps the spine in a neutral position. The position of the head should not be tilted forward or backward. A rolled up towel may help for a custom fit.  · Some people find relief with heat. Heat can be applied with either a warm shower or bath or a moist towel heated in the microwave and massage. Others prefer cold packs. You can make an ice pack by filling a plastic bag that seals at the top with ice cubes or crushed ice and then wrapping it with a thin towel. Try both and use the method that feels best for 15 to 20 minutes, several times a day.  · Whether using ice or heat, be careful that you do not injure your skin. Never put ice directly on the skin. Always wrap the ice in a towel or other type of cloth. This is very important, especially in people with poor skin sensations.  · Try to reduce your stress level. Emotional stress can lead to neck muscle tension and get in the way of or delay the healing process.  · You may use over-the-counter pain medicine to control pain, unless another  medicine was prescribed.If you have chronic liver or kidney disease or ever had a stomach ulcer or GI bleeding, talk with your healthcare provider before using these medicines.  Follow-up care  Follow up with your healthcare provider if your symptoms do not show signs of improvement after one week. Physical therapy or further tests may be needed.  If X-rays, CT scans, or MRI scans were taken, you will be told of any new findings that may affect your care.  Call 911  Call 911 if you have:  · Sudden weakness or numbness in one or both arms  · Neck swelling, difficulty or painful swallowing  · Difficulty breathing  · Chest pain  When to seek medical advice  Call your healthcare provider right away if any of these occur:  · Pain becomes worse or spreads into one or both arms  · Increasing headache with nausea or vomiting  · Fever of 100.4°F (38°C) or above lasting for 24 to 48 hours  Date Last Reviewed: 11/21/2015  © 1342-9303 [employer]. [address] [place] [address], [place] [address]. All rights reserved. This information is not intended as a substitute for professional medical care. Always follow your healthcare professional's instructions.

## 2017-05-20 NOTE — ED NOTES
Patient arrived in ED with complaints of Neck pain, patient arrived with an elevated BP and informed triage nurse that has been taking medications that were prescribed on last visit. When asked by this nurse if patient had seen his primary doctor patient stated no he was too busy with work to go. Explained to patient the importance of seeing primary care proveider after a visit with the ED for continued monitoring and adjustment of medications of blood pressure.

## 2017-05-20 NOTE — ED AVS SNAPSHOT
[facility] EMERGENCY DEPARTMENT  [address] [facility] [address]               Ramiro Ca   2017 12:06 AM   ED    Description:  Male : 1965   Department:  [facility]           Your Care was Coordinated By:     Provider Role From To    Ambika Beth MD Attending Provider 17 0020 --      Reason for Visit     Neck Pain           Diagnoses this Visit        Comments    Neck muscle spasm    -  Primary     Essential hypertension         Tobacco abuse           ED Disposition     None           To Do List           Follow-up Information     Follow up with ITA Carpenter MD. Schedule an appointment as soon as possible for a visit in 2 days.    Specialty:  Internal Medicine    Contact information:    [address]  [address]  [facility] [address]  [phone]          Follow up with [facility].    Specialty:  Emergency Medicine    Why:  If symptoms worsen    Contact information:    [address] [facility] [address]  [phone]        Schedule an appointment as soon as possible for a visit with [facility] - Smoking Cessation.    Specialty:  Smoking Cessation    Contact information:    [address] [facility] [address]  [facility] [address] [phone]       These Medications        Disp Refills Start End    diazePAM (VALIUM) 5 MG tablet 15 tablet 0 2017    Take 2 tablets (10 mg total) by mouth every 6 (six) hours as needed for Anxiety. - Oral    Pharmacy: [id number] - [place], [place] - [address] Ph #: [phone]       hydrocodone-acetaminophen 5-325mg (NORCO) 5-325 mg per tablet 10 tablet 0 2017    Take 1 tablet by mouth every 4 (four) hours as needed for Pain. - Oral    Pharmacy: [facility] - [place], [place] - [address] Ph #: [phone]         [facility]     [facility] Care [facility] -  Assistance  Unless otherwise directed by your  provider, please contact [facility], our nurse care line that is available for 24/7 assistance.     Registered nurses in the [facility] provide: appointment scheduling, clinical advisement, health education, and other advisory services.  Call: [phone] (toll free)               Medications           Message regarding Medications     Verify the changes and/or additions to your medication regime listed below are the same as discussed with your clinician today.  If any of these changes or additions are incorrect, please notify your healthcare provider.        START taking these NEW medications        Refills    diazePAM (VALIUM) 5 MG tablet 0    Sig: Take 2 tablets (10 mg total) by mouth every 6 (six) hours as needed for Anxiety.    Class: Print    Route: Oral    hydrocodone-acetaminophen 5-325mg (NORCO) 5-325 mg per tablet 0    Sig: Take 1 tablet by mouth every 4 (four) hours as needed for Pain.    Class: Print    Route: Oral      These medications were administered today        Dose Freq    cloNIDine tablet 0.2 mg 0.2 mg ED 1 Time    Sig: Take 2 tablets (0.2 mg total) by mouth ED 1 Time.    Class: Normal    Route: Oral    diazePAM tablet 10 mg 10 mg ED 1 Time    Sig: Take 2 tablets (10 mg total) by mouth ED 1 Time.    Class: Normal    Route: Oral    hydrocodone-acetaminophen 5-325mg per tablet 2 tablet 2 tablet ED 1 Time    Sig: Take 2 tablets by mouth ED 1 Time.    Class: Normal    Route: Oral           Verify that the below list of medications is an accurate representation of the medications you are currently taking.  If none reported, the list may be blank. If incorrect, please contact your healthcare provider. Carry this list with you in case of emergency.           Current Medications     diazePAM (VALIUM) 5 MG tablet Take 2 tablets (10 mg total) by mouth every 6 (six) hours as needed for Anxiety.    diazePAM tablet 10 mg Take 2 tablets (10 mg total) by mouth ED 1 Time.     hydrocodone-acetaminophen 5-325mg (NORCO) 5-325 mg per tablet Take 1 tablet by mouth every 4 (four) hours as needed for Pain.    ibuprofen (ADVIL,MOTRIN) 800 MG tablet Take 1 tablet (800 mg total) by mouth every 8 (eight) hours as needed for Pain.    lisinopril (PRINIVIL,ZESTRIL) 20 MG tablet Take 1 tablet (20 mg total) by mouth once daily.           Clinical Reference Information           Your Vitals Were     BP Pulse Temp Resp SpO2       150/84 77 99.1 °F (37.3 °C) 18 99%       Allergies as of 5/20/2017     No Known Allergies      Immunizations Administered on Date of Encounter - 5/20/2017     None      ED Micro, Lab, POCT     None      ED Imaging Orders     None        Discharge Instructions           The Benefits of Living Smoke Free  What do you want to gain from quitting? Check off some reasons to quit.  Health benefits  ___  Improve my ability to breathe without coughing or shortness of breath  ___  Reduce my risk of lung cancer, heart disease, chronic lung disease  ___  Have fewer wrinkles and softer skin  ___  Improve my sense of taste and smell  ___  For pregnant women--reduce the risk of having a miscarriage, stillbirth, premature birth, or low-birth-weight baby  Personal benefits  ___  Feel more in control of my life  ___  Have better-smelling hair, breath, clothes, home, and car  ___  Save time by not having to take smoke breaks, buy cigarettes, or hunt for a light  ___  Have whiter teeth  Family benefits  ___  Reduce my childrens respiratory tract infections  ___  Set a good example for my children  ___  Reduce my familys cancer risk  Financial benefits  ___  Save hundreds of dollars each year that would be spent on cigarettes  ___  Save money on medical bills  ___  Save on life, health, and car insurance premiums     Those dollars add up!  Cigarettes are expensive, and getting more expensive all the time. Do you realize how much money you are spending on cigarettes per year? What is the average  amount you spend on a pack of cigarettes? What is the average number of packs that you smoke per day? Using your answers to these questions, fill in this formula to help you find out:  ($ _____ per pack) ×  ( _____ number of packs per day) × (365 days) =  $ _____ yearly cost of smoking  Besides tobacco, there are other costs, including extra cleaning bills and replacement costs for clothing and furniture; medical expenses for smoking-related illnesses; and higher health, life, and car insurance premiums.  Cigars and pipes count too!  Cigars and pipes are also dangerous. So are smokeless (chewing) tobacco and snuff. All of these products contain nicotine, a highly addictive substance that has harmful effects on your body. Quitting smoking means giving up all tobacco products.      For more information  · smokefree.gov/xqmt-wh-pz-expert  · National Cancer [place] Smoking Quitline: 877-44U-QUIT (535-938-2516)   Date Last Reviewed: 11/18/2014 © 2000-2016 [employer]. [address] [place] [address]. All rights reserved. This information is not intended as a substitute for professional medical care. Always follow your healthcare professional's instructions.        DO NOT SMOKE  Out of Control High Blood Pressure (Established)    Your blood pressure was unusually high today. This can occur if youve missed doses of your blood pressure medicine. Or it can happen if you are taking other medicines. These include some asthma inhalers, decongestants, diet pills, and street drugs like cocaine and amphetamine.  Other causes include:  · Weight gain  · More salt in your diet  · Smoking  · Caffeine  Your blood pressure can also rise if you are emotionally upset or in intense pain. It may go back to normal after a period of rest.  A blood pressure reading is made up of 2 numbers. There is a top number over a bottom number. The top number is the systolic pressure. The bottom number is the diastolic  pressure. A normal blood pressure is less than 120 over less than 80. High blood pressure (hypertension) is when the top number is 140 or higher. Or it is when the bottom number is 90 or higher. To be high blood pressure, the numbers must be higher when tested over a period of time. The blood pressures between normal and hypertension are called prehypertension.  Home care  Its important to take steps to lower your blood pressure. If you are taking blood pressure medicine, the guidelines below may help you need less or no medicines in the future.  · Begin a weight-loss program if you are overweight.  · Cut back on the amount of salt in your diet:  ¨ Avoid high-salt foods like olives, pickles, smoked meats, and salted potato chips.  ¨ Dont add salt to your food at the table.  ¨ Use only small amounts of salt when cooking.  · Begin an exercise program. Talk with your health care provider about what exercise program is best for you. It doesnt have to be difficult. Even brisk walking for 20 minutes 3 times a week is a good form of exercise.  · Avoid medicines that have heart stimulants in them. This includes many cold and sinus decongestant pills and sprays, as well as diet pills. Check the warnings about hypertension on the label. Stimulants such as amphetamine or cocaine could be lethal for someone with hypertension. Never take these.  · Limit how much caffeine you drink. Or switch to noncaffeinated beverages.  · Stop smoking. If you are a long-time smoker, this can be hard. Enroll in a stop-smoking program to make it more likely that you will succeed. Talk with your provider about ways to quit.  · Learn how to handle stress better. This is an important part of any program to lower blood pressure. Learn ways to relax. These include meditation, yoga, and biofeedback.  · If medicines were prescribed, take them exactly as directed. Missing doses may cause your blood pressure to get out of control.  · Consider buying an  automatic blood pressure machine. These are available at many [facility]. Use this to monitor your blood pressure. Report the results to your provider.  Follow-up care  Regular visits to your own health care provider for blood pressure and medicine checks are an important part of your care. Make a follow-up appointment as directed.  When to seek medical advice  Call your health care provider right away if any of these occur:  · Chest, arm, shoulder, neck, or upper back pain  · Shortness of breath  · Severe headache  · Throbbing or rushing sound in the ears  · Nosebleed  · Extreme drowsiness, confusion, or fainting  · Dizziness or dizziness with spinning sensation (vertigo)  · Weakness in an arm or leg or on one side of the face  · Difficulty speaking or seeing   Date Last Reviewed: 11/25/2014 © 2000-2016 [employer]. [address] [place] [address]. All rights reserved. This information is not intended as a substitute for professional medical care. Always follow your healthcare professional's instructions.          Neck Spasm    A spasm of the neck muscles can happen after a sudden awkward neck movement. Sleeping with your neck in a crooked position can also cause spasm. Some people respond to emotional stress by tensing the muscles of their neck, shoulders, and upper back. If neck spasm lasts long enough, it can cause headache.  The treatment described below will usually help the pain to go away in 5 to 7 days. Pain that continues may need further evaluation or other types of treatment such as physical therapy.  Home care  · Rest and relax the muscles. Use a comfortable pillow that supports the head and keeps the spine in a neutral position. The position of the head should not be tilted forward or backward. A rolled up towel may help for a custom fit.  · Some people find relief with heat. Heat can be applied with either a warm shower or bath or a moist towel heated in the microwave and  massage. Others prefer cold packs. You can make an ice pack by filling a plastic bag that seals at the top with ice cubes or crushed ice and then wrapping it with a thin towel. Try both and use the method that feels best for 15 to 20 minutes, several times a day.  · Whether using ice or heat, be careful that you do not injure your skin. Never put ice directly on the skin. Always wrap the ice in a towel or other type of cloth. This is very important, especially in people with poor skin sensations.  · Try to reduce your stress level. Emotional stress can lead to neck muscle tension and get in the way of or delay the healing process.  · You may use over-the-counter pain medicine to control pain, unless another medicine was prescribed.If you have chronic liver or kidney disease or ever had a stomach ulcer or GI bleeding, talk with your healthcare provider before using these medicines.  Follow-up care  Follow up with your healthcare provider if your symptoms do not show signs of improvement after one week. Physical therapy or further tests may be needed.  If X-rays, CT scans, or MRI scans were taken, you will be told of any new findings that may affect your care.  Call 911  Call 911 if you have:  · Sudden weakness or numbness in one or both arms  · Neck swelling, difficulty or painful swallowing  · Difficulty breathing  · Chest pain  When to seek medical advice  Call your healthcare provider right away if any of these occur:  · Pain becomes worse or spreads into one or both arms  · Increasing headache with nausea or vomiting  · Fever of 100.4°F (38°C) or above lasting for 24 to 48 hours  Date Last Reviewed: 11/21/2015 © 2000-2016 [employer]. [address] [place] [address], [place] [address]. All rights reserved. This information is not intended as a substitute for professional medical care. Always follow your healthcare professional's instructions.          [facility] Sign-Up     Activating your [facility] account  is as easy as 1-2-3!     1) Visit [email or web address], select Sign Up Now, enter this activation code and your date of birth, then select Next.  [email or web address]  Expires: 6/13/2017 10:37 PM      2) Create a username and password to use when you visit [facility] in the future and select a security question in case you lose your password and select Next.    3) Enter your e-mail address and click Sign Up!    Additional Information  If you have questions, please e-mail [email or web address] or call [phone] to talk to our [facility] staff. Remember, [facility] is NOT to be used for urgent needs. For medical emergencies, dial 911.         Smoking Cessation     If you would like to quit smoking:   You may be eligible for free services if you are a [place] resident and started smoking cigarettes before September 1, 1988.  Call the Smoking Cessation Trust ([facility]) toll free at (549) 639-8454 or (218) 400-0958.   Call 9-946-QUIT-NOW if you do not meet the above criteria.   Contact us via email: [email or web address]   View our website for more information: [email or web address]         [facility] complies with applicable Federal civil rights laws and does not discriminate on the basis of race, color, national origin, age, disability, or sex.        Language Assistance Services     ATTENTION: Language assistance services are available, free of charge. Please call [phone].      ATENCIÓN: Si habla español, tiene a chaparro disposición servicios gratuitos de asistencia lingüística. Llame al [phone].     CHÚ Ý: N?u b?n nói Ti?ng Vi?t, có các d?ch v? h? tr? ngôn ng? mi?n phí dành cho b?n. G?i s? [phone].

## 2018-12-21 ENCOUNTER — HOSPITAL ENCOUNTER (EMERGENCY)
Facility: HOSPITAL | Age: 53
Discharge: HOME | End: 2018-12-21
Attending: EMERGENCY MEDICINE
Payer: MEDICAID

## 2018-12-21 VITALS
SYSTOLIC BLOOD PRESSURE: 176 MMHG | DIASTOLIC BLOOD PRESSURE: 101 MMHG | HEIGHT: 76 IN | HEART RATE: 76 BPM | RESPIRATION RATE: 18 BRPM | TEMPERATURE: 98 F | OXYGEN SATURATION: 100 % | BODY MASS INDEX: 27.4 KG/M2 | WEIGHT: 225 LBS

## 2018-12-21 DIAGNOSIS — Z87.39 HISTORY OF HERNIATED INTERVERTEBRAL DISC: ICD-10-CM

## 2018-12-21 DIAGNOSIS — M54.16 LUMBAR RADICULOPATHY: ICD-10-CM

## 2018-12-21 DIAGNOSIS — M54.32 SCIATICA, LEFT SIDE: Primary | ICD-10-CM

## 2018-12-21 LAB
BILIRUBIN, POC UA: NEGATIVE
BLOOD, POC UA: NEGATIVE
CLARITY, POC UA: CLEAR
COLOR, POC UA: YELLOW
GLUCOSE, POC UA: NEGATIVE
KETONES, POC UA: NEGATIVE
LEUKOCYTE EST, POC UA: NEGATIVE
NITRITE, POC UA: NEGATIVE
PH UR STRIP: 7 [PH]
PROTEIN, POC UA: NEGATIVE
SPECIFIC GRAVITY, POC UA: 1.02
UROBILINOGEN, POC UA: 1 E.U./DL

## 2018-12-21 PROCEDURE — 81003 URINALYSIS AUTO W/O SCOPE: CPT

## 2018-12-21 PROCEDURE — 25000003 PHARM REV CODE 250: Performed by: PHYSICIAN ASSISTANT

## 2018-12-21 PROCEDURE — 96372 THER/PROPH/DIAG INJ SC/IM: CPT

## 2018-12-21 PROCEDURE — 63600175 PHARM REV CODE 636 W HCPCS: Performed by: PHYSICIAN ASSISTANT

## 2018-12-21 PROCEDURE — 99284 EMERGENCY DEPT VISIT MOD MDM: CPT | Mod: 25

## 2018-12-21 RX ORDER — HYDROCODONE BITARTRATE AND ACETAMINOPHEN 5; 325 MG/1; MG/1
1 TABLET ORAL EVERY 4 HOURS PRN
Qty: 12 TABLET | Refills: 0 | Status: SHIPPED | OUTPATIENT
Start: 2018-12-21 | End: 2018-12-24

## 2018-12-21 RX ORDER — LIDOCAINE 50 MG/G
1 PATCH TOPICAL DAILY
Qty: 15 PATCH | Refills: 0 | Status: SHIPPED | OUTPATIENT
Start: 2018-12-21 | End: 2019-01-05

## 2018-12-21 RX ORDER — IBUPROFEN 600 MG/1
600 TABLET ORAL EVERY 6 HOURS PRN
Qty: 20 TABLET | Refills: 0 | Status: SHIPPED | OUTPATIENT
Start: 2018-12-21 | End: 2018-12-26

## 2018-12-21 RX ORDER — HYDROCODONE BITARTRATE AND ACETAMINOPHEN 5; 325 MG/1; MG/1
1 TABLET ORAL
Status: COMPLETED | OUTPATIENT
Start: 2018-12-21 | End: 2018-12-21

## 2018-12-21 RX ORDER — LIDOCAINE 50 MG/G
1 PATCH TOPICAL
Status: DISCONTINUED | OUTPATIENT
Start: 2018-12-21 | End: 2018-12-21

## 2018-12-21 RX ORDER — CYCLOBENZAPRINE HCL 10 MG
10 TABLET ORAL 3 TIMES DAILY PRN
Qty: 20 TABLET | Refills: 0 | Status: SHIPPED | OUTPATIENT
Start: 2018-12-21 | End: 2018-12-28

## 2018-12-21 RX ORDER — KETOROLAC TROMETHAMINE 30 MG/ML
15 INJECTION, SOLUTION INTRAMUSCULAR; INTRAVENOUS
Status: COMPLETED | OUTPATIENT
Start: 2018-12-21 | End: 2018-12-21

## 2018-12-21 RX ORDER — ORPHENADRINE CITRATE 30 MG/ML
60 INJECTION INTRAMUSCULAR; INTRAVENOUS
Status: COMPLETED | OUTPATIENT
Start: 2018-12-21 | End: 2018-12-21

## 2018-12-21 RX ADMIN — ORPHENADRINE CITRATE 60 MG: 30 INJECTION INTRAMUSCULAR; INTRAVENOUS at 06:12

## 2018-12-21 RX ADMIN — KETOROLAC TROMETHAMINE 15 MG: 30 INJECTION, SOLUTION INTRAMUSCULAR at 06:12

## 2018-12-21 RX ADMIN — HYDROCODONE BITARTRATE AND ACETAMINOPHEN 1 TABLET: 5; 325 TABLET ORAL at 06:12

## 2018-12-22 NOTE — DISCHARGE INSTRUCTIONS
Take medications as prescribed for pain.     Follow up with primary care and Orthopedics or Spine Center in 1-2 days.   Return to ER for worsening symptoms or as needed.

## 2018-12-22 NOTE — ED PROVIDER NOTES
Encounter Date: 12/21/2018    SCRIBE #1 NOTE: I, Tequila Stanley, am scribing for, and in the presence of,  Shannan SINGH. I have scribed the following portions of the note - Other sections scribed: HPI .       History     Chief Complaint   Patient presents with    Back Pain     left lower back pain with onset x 4 days ago rate 9/10 describe throbbing that sometime radiates to the to the left knee.  Pt reports taking Ibuprofen, last taken last night.  Denies any fever, n/v, chills, diarrhea, wt changes, or night sweats     53 y.o male presents with recurrent left lower back pain for 5 days. He states the pain is constant and stabbing 6/10 worse with movement. His pain occasionally shoots into his left leg. He was suppose to have back surgery to fix a herniated disc, but never had the procedure. He denies fever, chills, rash,  nausea, vomiting, numbness, weakness, tingling, incontinence, changes in urination, abdominal pain, paresthesias, and saddle anesthesia. No IV drug use. No hx of kidney stones. No heavy lifting or injury.       The history is provided by the patient.     Review of patient's allergies indicates:  No Known Allergies  Past Medical History:   Diagnosis Date    HTN (hypertension) 5/9/2013 2005    LBP radiating to right leg 5/9/2013    Smokes < 1 pack of cigarettes per day 5/9/2013    1/2 ppd     Past Surgical History:   Procedure Laterality Date    BACK SURGERY      c-spine fussion  2008     Family History   Problem Relation Age of Onset    Hypertension Mother      Social History     Tobacco Use    Smoking status: Current Every Day Smoker     Packs/day: 1.00     Types: Cigarettes    Smokeless tobacco: Never Used   Substance Use Topics    Alcohol use: No    Drug use: Not on file     Review of Systems   Constitutional: Negative for chills and fever.   Eyes: Negative for redness.   Respiratory: Negative for shortness of breath.    Cardiovascular: Negative for chest pain.    Gastrointestinal: Negative for abdominal pain, nausea and vomiting.   Genitourinary: Negative for difficulty urinating, dysuria, frequency, hematuria and urgency.   Musculoskeletal: Positive for back pain (left lumbar). Negative for gait problem and neck pain.   Skin: Negative for rash.   Neurological: Negative for dizziness, speech difficulty, weakness, light-headedness and numbness.   Psychiatric/Behavioral: Negative for confusion.       Physical Exam     Initial Vitals [12/21/18 1741]   BP Pulse Resp Temp SpO2   (!) 180/103 77 18 97.6 °F (36.4 °C) 99 %      MAP       --         Physical Exam    Nursing note and vitals reviewed.  Constitutional: He appears well-developed and well-nourished. No distress.   HENT:   Head: Normocephalic.   Right Ear: External ear normal.   Left Ear: External ear normal.   Eyes: Conjunctivae are normal.   Cardiovascular: Normal rate and regular rhythm. Exam reveals no gallop and no friction rub.    No murmur heard.  Pulses:       Radial pulses are 2+ on the right side, and 2+ on the left side.        Dorsalis pedis pulses are 2+ on the right side, and 2+ on the left side.   Pulmonary/Chest: Breath sounds normal. No respiratory distress. He has no wheezes. He has no rhonchi. He has no rales.   Abdominal: Soft. Bowel sounds are normal. He exhibits no distension. There is no tenderness. There is no rebound and no guarding.   Musculoskeletal: Normal range of motion.   No midline tenderness to palpation.  TTP to the left lumbar paraspinal musculature.  Pain significantly worse with movement, sitting up, rotation of the torso.  Normal strength.   Neurological: He is alert. No sensory deficit.   Skin: Skin is warm and dry. No erythema.   Psychiatric: He has a normal mood and affect.         ED Course   Procedures  Labs Reviewed   POCT URINALYSIS W/O SCOPE - Abnormal; Notable for the following components:       Result Value    Glucose, UA Negative (*)     Bilirubin, UA Negative (*)      Ketones, UA Negative (*)     Blood, UA Negative (*)     Protein, UA Negative (*)     Nitrite, UA Negative (*)     Leukocytes, UA Negative (*)     All other components within normal limits          Imaging Results    None          Medical Decision Making:   Initial Assessment:   54 y/o male with history of herniated disc presenting for evaluation of acute exacerbation of 10 year history of left lumbar back pain. Patient was previously evaluated by Orthopedics approximately 10 years ago and they recommended discectomy Patient denies  trauma, fever, chills, nausea, vomiting, history IV/SQ drug, rash, bowel or bladder inctoneince, saddle anesthesias, weakness, paresthesias, abdominal pain, urinary, symptoms, history of malignancy .  Exam above.  No midline tenderness to palpation.  Considered but doubt fracture, dislocation, epidural abscess, cauda equina.  No neurovascular deficits.  No urinary symptoms.  UA negative. 2+ pulses bilaterally. Considered but doubt AAA. Exam is consistent with sciatica.  Toradol, Norflex, Norco in the ED.  Medications at discharge for symptomatic treatment.  Instructed to follow up with primary care as well as Orthopedics for further evaluation management.  Return to the ER for worsening symptoms or as needed.               Scribe Attestation:   Scribe #1: I performed the above scribed service and the documentation accurately describes the services I performed. I attest to the accuracy of the note.    Attending Attestation:           Physician Attestation for Scribe:  Physician Attestation Statement for Scribe #1: I, Shannan Gonzalez PA-C, reviewed documentation, as scribed by Tequila Stanley in my presence, and it is both accurate and complete.                     Clinical Impression:   The primary encounter diagnosis was Sciatica, left side. Diagnoses of Lumbar radiculopathy and History of herniated intervertebral disc were also pertinent to this visit.                             Shannan  Paulette Gonzalez PA-C  12/21/18 9195

## 2019-01-31 ENCOUNTER — HOSPITAL ENCOUNTER (EMERGENCY)
Facility: HOSPITAL | Age: 54
Discharge: HOME | End: 2019-01-31
Attending: EMERGENCY MEDICINE
Payer: MEDICAID

## 2019-01-31 VITALS
SYSTOLIC BLOOD PRESSURE: 145 MMHG | RESPIRATION RATE: 20 BRPM | DIASTOLIC BLOOD PRESSURE: 65 MMHG | BODY MASS INDEX: 29.82 KG/M2 | OXYGEN SATURATION: 98 % | HEIGHT: 73 IN | WEIGHT: 225 LBS | HEART RATE: 88 BPM | TEMPERATURE: 98 F

## 2019-01-31 DIAGNOSIS — M54.42 ACUTE LEFT-SIDED LOW BACK PAIN WITH LEFT-SIDED SCIATICA: Primary | ICD-10-CM

## 2019-01-31 PROCEDURE — 25000003 PHARM REV CODE 250: Mod: ER | Performed by: EMERGENCY MEDICINE

## 2019-01-31 PROCEDURE — 63600175 PHARM REV CODE 636 W HCPCS: Mod: ER | Performed by: EMERGENCY MEDICINE

## 2019-01-31 PROCEDURE — 96372 THER/PROPH/DIAG INJ SC/IM: CPT | Mod: ER

## 2019-01-31 PROCEDURE — 99284 EMERGENCY DEPT VISIT MOD MDM: CPT | Mod: 25,ER

## 2019-01-31 RX ORDER — IBUPROFEN 800 MG/1
800 TABLET ORAL EVERY 6 HOURS PRN
Qty: 20 TABLET | Refills: 0 | Status: SHIPPED | OUTPATIENT
Start: 2019-01-31 | End: 2021-06-05

## 2019-01-31 RX ORDER — METHOCARBAMOL 750 MG/1
1500 TABLET, FILM COATED ORAL EVERY 6 HOURS
Qty: 24 TABLET | Refills: 0 | Status: SHIPPED | OUTPATIENT
Start: 2019-01-31 | End: 2019-02-03

## 2019-01-31 RX ORDER — METHOCARBAMOL 750 MG/1
1500 TABLET, FILM COATED ORAL
Status: COMPLETED | OUTPATIENT
Start: 2019-01-31 | End: 2019-01-31

## 2019-01-31 RX ORDER — KETOROLAC TROMETHAMINE 30 MG/ML
30 INJECTION, SOLUTION INTRAMUSCULAR; INTRAVENOUS
Status: COMPLETED | OUTPATIENT
Start: 2019-01-31 | End: 2019-01-31

## 2019-01-31 RX ORDER — GABAPENTIN 100 MG/1
100 CAPSULE ORAL 3 TIMES DAILY
Qty: 45 CAPSULE | Refills: 0 | Status: SHIPPED | OUTPATIENT
Start: 2019-01-31 | End: 2021-05-17 | Stop reason: SDUPTHER

## 2019-01-31 RX ADMIN — METHOCARBAMOL 1500 MG: 750 TABLET, FILM COATED ORAL at 10:01

## 2019-01-31 RX ADMIN — KETOROLAC TROMETHAMINE 30 MG: 30 INJECTION INTRAMUSCULAR; INTRAVENOUS at 10:01

## 2019-02-01 NOTE — ED PROVIDER NOTES
Encounter Date: 1/31/2019    SCRIBE #1 NOTE: I, Francisca Ballard , am scribing for, and in the presence of,  Dr. Nelson . I have scribed the following portions of the note - Other sections scribed: HPI, ROS, PE.       History     Chief Complaint   Patient presents with    Back Pain     C/O low back pain x3 weeks, pain does go down LT leg at times. No changes for bowel/bladder. No known injury.     This is a 53 year old male complaining of intermittent left sided sharp lower back pain x 1 month. Pain radiates down left leg and knee intermittently. Patient denies changes in bowel/bladder or numbness in groin area. He denies fever, new injury or problems when he walks. He reports doing a lot of walking and bending at his job.      The history is provided by the patient. No  was used.     Review of patient's allergies indicates:  No Known Allergies  Past Medical History:   Diagnosis Date    HTN (hypertension) 5/9/2013 2005    LBP radiating to right leg 5/9/2013    Smokes < 1 pack of cigarettes per day 5/9/2013    1/2 ppd     Past Surgical History:   Procedure Laterality Date    BACK SURGERY      c-spine fussion  2008     Family History   Problem Relation Age of Onset    Hypertension Mother      Social History     Tobacco Use    Smoking status: Current Every Day Smoker     Packs/day: 1.00     Types: Cigarettes    Smokeless tobacco: Never Used   Substance Use Topics    Alcohol use: No    Drug use: Not on file     Review of Systems   Constitutional: Negative for fever.   Respiratory: Negative for shortness of breath.    Cardiovascular: Negative for chest pain and leg swelling.   Genitourinary: Negative for difficulty urinating.   Musculoskeletal: Positive for back pain (lower) and myalgias (left leg pain from back ).   Skin: Negative for rash.   Neurological: Negative for syncope, weakness and numbness.   All other systems reviewed and are negative.      Physical Exam     Initial Vitals  [01/31/19 2146]   BP Pulse Resp Temp SpO2   (!) 148/93 87 17 98.3 °F (36.8 °C) 97 %      MAP       --         Physical Exam    Nursing note and vitals reviewed.  Constitutional: Vital signs are normal. He appears well-developed and well-nourished. He is not diaphoretic. No distress.   HENT:   Head: Normocephalic and atraumatic.   Mouth/Throat: Oropharynx is clear and moist.   Eyes: Conjunctivae are normal.   Neck: Normal range of motion and phonation normal. Neck supple. No stridor present.   Cardiovascular: Normal rate, normal heart sounds and intact distal pulses.   Pulmonary/Chest: Effort normal and breath sounds normal. No stridor. No respiratory distress.   Musculoskeletal: Normal range of motion. He exhibits no edema.        Lumbar back: He exhibits tenderness and spasm. He exhibits no bony tenderness, no swelling and no deformity.        Back:    Neurological: He is alert and oriented to person, place, and time.   Positive straight leg raise on the left.    Skin: Skin is warm and dry.   Psychiatric: He has a normal mood and affect.         ED Course   Procedures  Labs Reviewed - No data to display       Imaging Results    None                     Scribe Attestation:   Scribe #1: I performed the above scribed service and the documentation accurately describes the services I performed. I attest to the accuracy of the note.        Labs Reviewed  No visits with results within 1 Day(s) from this visit.   Latest known visit with results is:   Admission on 12/21/2018, Discharged on 12/21/2018   Component Date Value Ref Range Status    Glucose, UA 12/21/2018 Negative*  Final    Bilirubin, UA 12/21/2018 Negative*  Final    Ketones, UA 12/21/2018 Negative*  Final    Spec Grav UA 12/21/2018 1.025   Final    Blood, UA 12/21/2018 Negative*  Final    PH, UA 12/21/2018 7.0   Final    Protein, UA 12/21/2018 Negative*  Final    Urobilinogen, UA 12/21/2018 1.0  E.U./dL Final    Nitrite, UA 12/21/2018 Negative*  Final     Leukocytes, UA 12/21/2018 Negative*  Final    Color, UA 12/21/2018 Yellow   Final    Clarity,  12/21/2018 Clear   Final        Imaging Reviewed    Imaging Results    None         Medications given in ED    Medications   methocarbamol tablet 1,500 mg (1,500 mg Oral Given 1/31/19 2213)   ketorolac injection 30 mg (30 mg Intramuscular Given 1/31/19 2213)       This document was produced by a scribe under my direction and in my presence. I agree with the content of the note and have made any necessary edits.     Nicolette Nelson MD         Note was created using voice recognition software. Note may have occasional typographical errors that may not have been identified and edited despite good ana paula initial review prior to signing.           Discharge Medications     Discharge Medication List as of 1/31/2019 10:27 PM      START taking these medications    Details   gabapentin (NEURONTIN) 100 MG capsule Take 1 capsule (100 mg total) by mouth 3 (three) times daily. Take one tab at bedtime x 3 days. Then take one tab Q12 x 3 days. Then take one tab Q8, Starting u 1/31/2019, Until Sat 3/2/2019, Print      ibuprofen (ADVIL,MOTRIN) 800 MG tablet Take 1 tablet (800 mg total) by mouth every 6 (six) hours as needed for Pain., Starting u 1/31/2019, Normal      methocarbamol (ROBAXIN) 750 MG Tab Take 2 tablets (1,500 mg total) by mouth every 6 (six) hours. for 3 days, Starting Thu 1/31/2019, Until Sun 2/3/2019, Normal         CONTINUE these medications which have NOT CHANGED    Details   diazePAM (VALIUM) 5 MG tablet Take 2 tablets (10 mg total) by mouth every 6 (six) hours as needed for Anxiety., Starting 5/20/2017, Until Mon 6/19/17, Print      lisinopril (PRINIVIL,ZESTRIL) 20 MG tablet Take 1 tablet (20 mg total) by mouth once daily., Starting 4/29/2017, Until Sun 4/29/18, Normal                   Patient discharged to home in stable condition with instructions to:   1. Please take all meds as prescribed.  2. Follow-up  with your primary care doctor   3. Return precautions discussed and patient and/or family/caretaker understands to return to the emergency room for any concerns including worsening of your current symptoms, fever, chills, night sweats, worsening pain, chest pain, shortness of breath, nausea, vomiting, diarrhea, bleeding, headache, difficulty talking, visual disturbances, weakness, numbness or any other acute concerns       Clinical Impression:     1. Acute left-sided low back pain with left-sided sciatica                                   Nicolette Nelson MD  02/05/19 5494

## 2019-02-21 ENCOUNTER — HOSPITAL ENCOUNTER (EMERGENCY)
Facility: HOSPITAL | Age: 54
Discharge: HOME | End: 2019-02-21
Attending: EMERGENCY MEDICINE
Payer: MEDICAID

## 2019-02-21 VITALS
RESPIRATION RATE: 17 BRPM | BODY MASS INDEX: 29.82 KG/M2 | SYSTOLIC BLOOD PRESSURE: 159 MMHG | HEIGHT: 73 IN | WEIGHT: 225 LBS | DIASTOLIC BLOOD PRESSURE: 103 MMHG | OXYGEN SATURATION: 98 % | HEART RATE: 87 BPM | TEMPERATURE: 99 F

## 2019-02-21 DIAGNOSIS — R68.89 FLU-LIKE SYMPTOMS: ICD-10-CM

## 2019-02-21 DIAGNOSIS — J40 BRONCHITIS: Primary | ICD-10-CM

## 2019-02-21 DIAGNOSIS — R06.2 WHEEZING: ICD-10-CM

## 2019-02-21 LAB
CTP QC/QA: YES
FLUAV AG NPH QL: NEGATIVE
FLUBV AG NPH QL: NEGATIVE

## 2019-02-21 PROCEDURE — 25000242 PHARM REV CODE 250 ALT 637 W/ HCPCS: Mod: ER | Performed by: EMERGENCY MEDICINE

## 2019-02-21 PROCEDURE — 94640 AIRWAY INHALATION TREATMENT: CPT | Mod: ER

## 2019-02-21 PROCEDURE — 87804 INFLUENZA ASSAY W/OPTIC: CPT | Mod: ER

## 2019-02-21 PROCEDURE — 99284 EMERGENCY DEPT VISIT MOD MDM: CPT | Mod: ER,25

## 2019-02-21 RX ORDER — ALBUTEROL SULFATE 90 UG/1
1-2 AEROSOL, METERED RESPIRATORY (INHALATION) EVERY 6 HOURS PRN
Qty: 1 INHALER | Refills: 0 | Status: SHIPPED | OUTPATIENT
Start: 2019-02-21 | End: 2021-05-17 | Stop reason: SDUPTHER

## 2019-02-21 RX ORDER — IPRATROPIUM BROMIDE AND ALBUTEROL SULFATE 2.5; .5 MG/3ML; MG/3ML
3 SOLUTION RESPIRATORY (INHALATION)
Status: COMPLETED | OUTPATIENT
Start: 2019-02-21 | End: 2019-02-21

## 2019-02-21 RX ORDER — PREDNISONE 20 MG/1
60 TABLET ORAL DAILY
Qty: 12 TABLET | Refills: 0 | Status: SHIPPED | OUTPATIENT
Start: 2019-02-21 | End: 2019-02-25

## 2019-02-21 RX ORDER — PROMETHAZINE HYDROCHLORIDE AND DEXTROMETHORPHAN HYDROBROMIDE 6.25; 15 MG/5ML; MG/5ML
5 SYRUP ORAL 4 TIMES DAILY PRN
Qty: 180 ML | Refills: 0 | Status: SHIPPED | OUTPATIENT
Start: 2019-02-21 | End: 2019-02-26

## 2019-02-21 RX ORDER — ONDANSETRON 4 MG/1
4 TABLET, ORALLY DISINTEGRATING ORAL EVERY 8 HOURS PRN
Qty: 14 TABLET | Refills: 1 | Status: SHIPPED | OUTPATIENT
Start: 2019-02-21 | End: 2021-06-05

## 2019-02-21 RX ORDER — AZITHROMYCIN 250 MG/1
TABLET, FILM COATED ORAL
Qty: 6 TABLET | Refills: 0 | Status: ON HOLD | OUTPATIENT
Start: 2019-02-21 | End: 2021-06-06 | Stop reason: HOSPADM

## 2019-02-21 RX ADMIN — IPRATROPIUM BROMIDE AND ALBUTEROL SULFATE 3 ML: .5; 3 SOLUTION RESPIRATORY (INHALATION) at 09:02

## 2019-02-21 NOTE — ED PROVIDER NOTES
Encounter Date: 2/21/2019    SCRIBE #1 NOTE: I, Jose Enriquez, am scribing for, and in the presence of,  Dr. Beth. I have scribed the following portions of the note - Other sections scribed: HPI, ROS ,PE.       History     Chief Complaint   Patient presents with    Chills     onset tuesday.      Cough    Headache    Nasal Congestion     This is a 53 y.o. male who presents to the ED with a complaint of HA that began two days ago.  Pt takes ibuprofen on a regular basis.  He has taken Mucinex with relief of his symptoms.  Nothing worsens his symptoms.  Pt admits to smoking half a pack of cigarettes a day.  Pt endorses subjective fever, chills, decreased appetite, nasal congestion (resolved), wheezing, SOB, coughing, nausea, three episodes of emesis (last episode was last PM), myalgias, and dizziness.  Patient is able to tolerate liquids.  He denies sore throat, visual disturbance, CP, diarrhea, dysuria, or rash.  Pt has had sick contact at his job.      The history is provided by the patient.     Review of patient's allergies indicates:  No Known Allergies  Past Medical History:   Diagnosis Date    HTN (hypertension) 5/9/2013 2005    LBP radiating to right leg 5/9/2013    Smokes < 1 pack of cigarettes per day 5/9/2013    1/2 ppd     Past Surgical History:   Procedure Laterality Date    BACK SURGERY      c-spine fussion  2008     Family History   Problem Relation Age of Onset    Hypertension Mother      Social History     Tobacco Use    Smoking status: Current Every Day Smoker     Packs/day: 1.00     Types: Cigarettes    Smokeless tobacco: Never Used   Substance Use Topics    Alcohol use: No    Drug use: Not on file     Review of Systems   Constitutional: Positive for appetite change (Decreased), chills and fever (Subjective).   HENT: Positive for congestion (Resolved). Negative for sore throat.    Eyes: Negative for visual disturbance.   Respiratory: Positive for cough, shortness of breath and  wheezing.    Cardiovascular: Negative for chest pain.   Gastrointestinal: Positive for nausea and vomiting. Negative for diarrhea.   Genitourinary: Negative for dysuria.   Musculoskeletal: Positive for myalgias.   Skin: Negative for rash.   Neurological: Positive for dizziness and headaches.       Physical Exam     Initial Vitals [02/21/19 0853]   BP Pulse Resp Temp SpO2   (!) 159/103 87 19 98.5 °F (36.9 °C) 98 %      MAP       --         Physical Exam    Nursing note and vitals reviewed.  Constitutional: He appears well-developed and well-nourished.   HENT:   Head: Normocephalic and atraumatic.   Right Ear: Tympanic membrane and external ear normal.   Left Ear: Tympanic membrane and external ear normal.   Mouth/Throat: Uvula is midline, oropharynx is clear and moist and mucous membranes are normal. No oropharyngeal exudate, posterior oropharyngeal edema or posterior oropharyngeal erythema.   Eyes: Conjunctivae are normal. Pupils are equal, round, and reactive to light.   Neck: Normal range of motion. Neck supple.   Cardiovascular: Normal rate, regular rhythm, normal heart sounds and intact distal pulses. Exam reveals no gallop and no friction rub.    No murmur heard.  Pulmonary/Chest: No respiratory distress. He has wheezes (Diffuse). He has no rhonchi. He has no rales. He exhibits no tenderness.   Pt is speaking in complete sentences.   Musculoskeletal: Normal range of motion.   Neurological: He is alert and oriented to person, place, and time.   Skin: Skin is warm and dry.   Psychiatric: He has a normal mood and affect. His behavior is normal.         ED Course   Procedures  Labs Reviewed   POCT INFLUENZA A/B          Imaging Results          X-Ray Chest PA And Lateral (Final result)  Result time 02/21/19 09:35:54    Final result by Vidal Burns DO (02/21/19 09:35:54)                 Impression:      See above      Electronically signed by: Vidal Burns DO  Date:    02/21/2019  Time:    09:35              Narrative:    EXAMINATION:  XR CHEST PA AND LATERAL    CLINICAL HISTORY:  Wheezing    TECHNIQUE:  PA and lateral views of the chest were performed.    COMPARISON:  03/13/2012    FINDINGS:  No significant change from prior.  No new lung opacity.  No lung consolidation..  No pleural effusion or pneumothorax.  Heart size within normal limits.  Continued atherosclerotic aorta.  Visualized osseous structures grossly intact.                                 Medical Decision Making:   Initial Assessment:   This is a 53 y.o. male who presents to the ED with a complaint of a HA, subjective fever, chills, decreased appetite, nasal congestion (resolved), SOB, coughing, wheezing, nausea, emesis, myalgias, HA, and dizziness.    The pt's physical examination was significant for diffuse wheezing.       Clinical Tests:   Lab Tests: Ordered  Radiological Study: Ordered  ED Management:  I will order CXR and POCT Influenza A/B.   Patient felt much better after the nebulizer treatment.  He is a smoker with a productive cough will be discharged on azithromycin.  Also be prescribed an inhaler.  Patient be described Phenergan DM as well as prednisone.  Patient will be treated with albuterol.              Scribe Attestation:   Scribe #1: I performed the above scribed service and the documentation accurately describes the services I performed. I attest to the accuracy of the note.       I, Dr. Ambika Beth, personally performed the services described in this documentation. All medical record entries made by the scribe were at my direction and in my presence.  I have reviewed the chart and agree that the record reflects my personal performance and is accurate and complete. Ambika Beth MD.  6:30 PM 02/21/2019             Clinical Impression:     1. Bronchitis    2. Wheezing    3. Flu-like symptoms                                  Ambika Beth MD  02/21/19 1830       Ambika Beth MD  02/21/19 1831

## 2019-02-21 NOTE — DISCHARGE INSTRUCTIONS
Rest.  Drink plenty of fluids.  Return here at any time.  Call your doctor for close follow-up.  No smoking.  Alternate acetaminophen and ibuprofen for symptoms

## 2019-10-30 ENCOUNTER — HOSPITAL ENCOUNTER (EMERGENCY)
Facility: HOSPITAL | Age: 54
Discharge: HOME | End: 2019-10-30
Attending: EMERGENCY MEDICINE
Payer: COMMERCIAL

## 2019-10-30 VITALS
WEIGHT: 220 LBS | DIASTOLIC BLOOD PRESSURE: 92 MMHG | HEART RATE: 79 BPM | HEIGHT: 73 IN | BODY MASS INDEX: 29.16 KG/M2 | SYSTOLIC BLOOD PRESSURE: 161 MMHG | OXYGEN SATURATION: 97 % | RESPIRATION RATE: 16 BRPM | TEMPERATURE: 98 F

## 2019-10-30 DIAGNOSIS — M54.42 ACUTE LEFT-SIDED LOW BACK PAIN WITH LEFT-SIDED SCIATICA: Primary | ICD-10-CM

## 2019-10-30 PROCEDURE — 63600175 PHARM REV CODE 636 W HCPCS: Mod: ER | Performed by: PHYSICIAN ASSISTANT

## 2019-10-30 PROCEDURE — 96372 THER/PROPH/DIAG INJ SC/IM: CPT | Mod: ER

## 2019-10-30 PROCEDURE — 99284 EMERGENCY DEPT VISIT MOD MDM: CPT | Mod: 25,ER

## 2019-10-30 PROCEDURE — 25000003 PHARM REV CODE 250: Mod: ER | Performed by: PHYSICIAN ASSISTANT

## 2019-10-30 RX ORDER — DEXAMETHASONE SODIUM PHOSPHATE 4 MG/ML
10 INJECTION, SOLUTION INTRA-ARTICULAR; INTRALESIONAL; INTRAMUSCULAR; INTRAVENOUS; SOFT TISSUE
Status: COMPLETED | OUTPATIENT
Start: 2019-10-30 | End: 2019-10-30

## 2019-10-30 RX ORDER — HYDROCODONE BITARTRATE AND ACETAMINOPHEN 5; 325 MG/1; MG/1
1 TABLET ORAL
Status: COMPLETED | OUTPATIENT
Start: 2019-10-30 | End: 2019-10-30

## 2019-10-30 RX ORDER — PREDNISONE 20 MG/1
60 TABLET ORAL DAILY
Qty: 9 TABLET | Refills: 0 | Status: SHIPPED | OUTPATIENT
Start: 2019-10-30 | End: 2019-11-02

## 2019-10-30 RX ORDER — KETOROLAC TROMETHAMINE 30 MG/ML
30 INJECTION, SOLUTION INTRAMUSCULAR; INTRAVENOUS
Status: COMPLETED | OUTPATIENT
Start: 2019-10-30 | End: 2019-10-30

## 2019-10-30 RX ORDER — LIDOCAINE 50 MG/G
1 PATCH TOPICAL DAILY
Qty: 6 PATCH | Refills: 0 | Status: SHIPPED | OUTPATIENT
Start: 2019-10-30 | End: 2024-01-17

## 2019-10-30 RX ORDER — MELOXICAM 7.5 MG/1
7.5 TABLET ORAL DAILY
Qty: 12 TABLET | Refills: 0 | Status: SHIPPED | OUTPATIENT
Start: 2019-10-30 | End: 2021-06-05

## 2019-10-30 RX ADMIN — DEXAMETHASONE SODIUM PHOSPHATE 10 MG: 4 INJECTION, SOLUTION INTRA-ARTICULAR; INTRALESIONAL; INTRAMUSCULAR; INTRAVENOUS; SOFT TISSUE at 03:10

## 2019-10-30 RX ADMIN — KETOROLAC TROMETHAMINE 30 MG: 30 INJECTION, SOLUTION INTRAMUSCULAR; INTRAVENOUS at 03:10

## 2019-10-30 RX ADMIN — HYDROCODONE BITARTRATE AND ACETAMINOPHEN 1 TABLET: 5; 325 TABLET ORAL at 03:10

## 2019-10-30 NOTE — ED PROVIDER NOTES
Encounter Date: 10/30/2019    SCRIBE #1 NOTE: I, Jaclyn Joyner, am scribing for, and in the presence of,  SAMANTHA Carrillo. I have scribed the following portions of the note - Other sections scribed: ANH KUMAR.       History     Chief Complaint   Patient presents with    Back Pain     Left lower back pain radiating down leg.  Denies injury.     Ramiro Ca is a 54 y.o. male who presents to the ED complaining of lower back pain that radiates down left leg. Has had this same pain before. This episode started a week ago. Denies any fall or trauma. Took advil and ibuprofen with no relief. Placed an IcyHot patch on lower back that provided some relief. Works as a  that sometimes requires heavy lifting. Denies chest pain, abdominal pain, fever, nausea, vomiting, hematuria, and dysuria. No known allergies. No PMHx.     The history is provided by the patient. No  was used.     Review of patient's allergies indicates:  No Known Allergies  Past Medical History:   Diagnosis Date    HTN (hypertension) 5/9/2013 2005    LBP radiating to right leg 5/9/2013    Smokes < 1 pack of cigarettes per day 5/9/2013    1/2 ppd     Past Surgical History:   Procedure Laterality Date    BACK SURGERY      c-spine fussion  2008     Family History   Problem Relation Age of Onset    Hypertension Mother      Social History     Tobacco Use    Smoking status: Current Every Day Smoker     Packs/day: 0.50     Types: Cigarettes    Smokeless tobacco: Never Used   Substance Use Topics    Alcohol use: No    Drug use: Never     Review of Systems   Constitutional: Negative for fever.   Cardiovascular: Negative for chest pain.   Gastrointestinal: Negative for abdominal pain, nausea and vomiting.   Genitourinary: Negative for dysuria and hematuria.   Musculoskeletal: Positive for back pain.   All other systems reviewed and are negative.      Physical Exam     Initial Vitals [10/30/19 1525]   BP Pulse Resp  "Temp SpO2   (!) 152/93 83 18 97.9 °F (36.6 °C) 97 %      MAP       --         Physical Exam    Nursing note and vitals reviewed.  Constitutional: He appears well-developed and well-nourished. He is not diaphoretic. No distress.   HENT:   Head: Normocephalic and atraumatic.   Nose: Nose normal.   Eyes: Conjunctivae and EOM are normal. Pupils are equal, round, and reactive to light. Right eye exhibits no discharge. Left eye exhibits no discharge.   Neck: Normal range of motion. No tracheal deviation present. No JVD present.   Cardiovascular: Normal rate, regular rhythm and normal heart sounds. Exam reveals no friction rub.    No murmur heard.  Pulmonary/Chest: Breath sounds normal. No stridor. No respiratory distress. He has no wheezes. He has no rhonchi. He has no rales. He exhibits no tenderness.   Abdominal: Soft. He exhibits no distension. There is no tenderness. There is no rigidity, no rebound, no guarding, no CVA tenderness, no tenderness at McBurney's point and negative Luz's sign.   Musculoskeletal: Normal range of motion.   Reproducible TTP of the L lumbar musculature. No midline tenderness or bony deformities noted down the neck and spine. No bony TTP to the hips. Ambulatory. (+) SLR on the L. Pedal pulses 2+ and equal. No rash/skin lesions.    Neurological: He is alert and oriented to person, place, and time.   Skin: Skin is warm and dry. No rash and no abscess noted. No erythema. No pallor.         ED Course   Procedures  Labs Reviewed - No data to display       Imaging Results    None          Medical Decision Making:   History:   Old Medical Records: I decided to obtain old medical records.      This is an emergent evaluation of a 54 y.o. male with a Hx of chronic low back pain presenting to the ED for "sharp" L sided low back pain that intermittently radiates down the LLE. Denies traumatic injury, Hx of IV drug use, fever, urinary retention/loss of bowel bladder control, urinary symptoms, and " abdominal pain. Afebrile. Patient is non-toxic appearing and in no acute distress. Ambulatory. (+) SLR. No sensory or motor deficit.     Presentation most consistent with acute lumbosacral radiculopathy. No Hx of trauma to suggest acute vertebral fracture, or warrant emergent imaging at this time. I doubt cauda equina, AAA rupture, and ureteral stone. I have a low suspicion for infectious etiology, including for epidural abscess and pyelonephritis. I have a lower suspicion for neoplastic etiology.     Symptoms treated in ED. Discharged home with supportive care. Instructed the patient to follow up with PCP and orthopedics for reevaluation and management of symptoms. An educational resource on sciatica is issued to the patient.     I discussed with the patient the diagnosis, treatment plan, indications for return to the emergency department, and for expected follow-up. The patient verbalized an understanding. The patient is asked if there are any questions or concerns. We discuss the case, until all issues are addressed to the patients satisfaction. Patient understands and is agreeable to the plan.           Scribe Attestation:   Scribe #1: I performed the above scribed service and the documentation accurately describes the services I performed. I attest to the accuracy of the note.      Scribe attestation: I, Jose Roberto Escamilla, personally performed the services described in this documentation. All medical record entries made by the scribe were at my direction and in my presence.  I have reviewed the chart and agree that the record reflects my personal performance and is accurate and complete            Clinical Impression:     1. Acute left-sided low back pain with left-sided sciatica                                   Jose Roberto Escamilla PA-C  10/30/19 9786

## 2019-10-30 NOTE — ED TRIAGE NOTES
Left sided lower back pain that he describes as shooting pain that radiates down left leg. PT states he hurt his back on Wednesday. He reports pain is similar to previous sciatic nerve pain.

## 2019-11-11 ENCOUNTER — HOSPITAL ENCOUNTER (EMERGENCY)
Facility: HOSPITAL | Age: 54
Discharge: HOME | End: 2019-11-11
Attending: EMERGENCY MEDICINE
Payer: COMMERCIAL

## 2019-11-11 VITALS
SYSTOLIC BLOOD PRESSURE: 132 MMHG | HEIGHT: 73 IN | RESPIRATION RATE: 15 BRPM | BODY MASS INDEX: 29.16 KG/M2 | HEART RATE: 75 BPM | OXYGEN SATURATION: 100 % | TEMPERATURE: 99 F | DIASTOLIC BLOOD PRESSURE: 101 MMHG | WEIGHT: 220 LBS

## 2019-11-11 DIAGNOSIS — S39.012A LUMBAR STRAIN, INITIAL ENCOUNTER: Primary | ICD-10-CM

## 2019-11-11 PROCEDURE — 99284 EMERGENCY DEPT VISIT MOD MDM: CPT | Mod: 25

## 2019-11-11 PROCEDURE — 63600175 PHARM REV CODE 636 W HCPCS: Performed by: EMERGENCY MEDICINE

## 2019-11-11 PROCEDURE — 25000003 PHARM REV CODE 250: Performed by: EMERGENCY MEDICINE

## 2019-11-11 RX ORDER — DEXAMETHASONE SODIUM PHOSPHATE 4 MG/ML
12 INJECTION, SOLUTION INTRA-ARTICULAR; INTRALESIONAL; INTRAMUSCULAR; INTRAVENOUS; SOFT TISSUE
Status: COMPLETED | OUTPATIENT
Start: 2019-11-11 | End: 2019-11-11

## 2019-11-11 RX ORDER — HYDROCODONE BITARTRATE AND ACETAMINOPHEN 5; 325 MG/1; MG/1
2 TABLET ORAL
Status: COMPLETED | OUTPATIENT
Start: 2019-11-11 | End: 2019-11-11

## 2019-11-11 RX ORDER — HYDROMORPHONE HYDROCHLORIDE 2 MG/ML
1 INJECTION, SOLUTION INTRAMUSCULAR; INTRAVENOUS; SUBCUTANEOUS
Status: COMPLETED | OUTPATIENT
Start: 2019-11-11 | End: 2019-11-11

## 2019-11-11 RX ORDER — METHOCARBAMOL 500 MG/1
1000 TABLET, FILM COATED ORAL 3 TIMES DAILY
Qty: 30 TABLET | Refills: 0 | Status: SHIPPED | OUTPATIENT
Start: 2019-11-11 | End: 2019-11-16

## 2019-11-11 RX ORDER — CYCLOBENZAPRINE HCL 10 MG
10 TABLET ORAL
Status: COMPLETED | OUTPATIENT
Start: 2019-11-11 | End: 2019-11-11

## 2019-11-11 RX ORDER — ONDANSETRON 4 MG/1
4 TABLET, ORALLY DISINTEGRATING ORAL
Status: COMPLETED | OUTPATIENT
Start: 2019-11-11 | End: 2019-11-11

## 2019-11-11 RX ORDER — HYDROCODONE BITARTRATE AND ACETAMINOPHEN 5; 325 MG/1; MG/1
1 TABLET ORAL EVERY 4 HOURS PRN
Qty: 15 TABLET | Refills: 0 | Status: SHIPPED | OUTPATIENT
Start: 2019-11-11 | End: 2019-11-21

## 2019-11-11 RX ADMIN — DEXAMETHASONE SODIUM PHOSPHATE 12 MG: 4 INJECTION, SOLUTION INTRA-ARTICULAR; INTRALESIONAL; INTRAMUSCULAR; INTRAVENOUS; SOFT TISSUE at 05:11

## 2019-11-11 RX ADMIN — CYCLOBENZAPRINE 10 MG: 10 TABLET, FILM COATED ORAL at 05:11

## 2019-11-11 RX ADMIN — HYDROMORPHONE HYDROCHLORIDE 1 MG: 2 INJECTION, SOLUTION INTRAMUSCULAR; INTRAVENOUS; SUBCUTANEOUS at 05:11

## 2019-11-11 RX ADMIN — ONDANSETRON 4 MG: 4 TABLET, ORALLY DISINTEGRATING ORAL at 05:11

## 2019-11-11 RX ADMIN — HYDROCODONE BITARTRATE AND ACETAMINOPHEN 2 TABLET: 5; 325 TABLET ORAL at 05:11

## 2019-11-11 NOTE — DISCHARGE INSTRUCTIONS
Please use a heating pad and rest.  Please return immediately if you get worse or if new problems develop.  Please follow-up with your primary care doctor this week.  Rest.

## 2019-11-11 NOTE — ED PROVIDER NOTES
Encounter Date: 11/11/2019    SCRIBE #1 NOTE: I, Jorge Monson, am scribing for, and in the presence of,  Donta Araujo MD. I have scribed the following portions of the note - Other sections scribed: HPI, ROS.       History     Chief Complaint   Patient presents with    Flank Pain     left flank pain x 1 week. pt denies dysuria, fever or chills     CC: Flank Pain    HPI: This is a 54 y.o. M who has HTN who presents to the ED for emergent evaluation of acute and moderate left flank pain that began upon waking 2 weeks ago. Pt describes the flank pain as a sharp pain that is exacerbated with certain movement. He states that the flank pain intermittently radiates to the left lower extremity. 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 Pt has a Hx of cervical fusions. He smokes cigarettes. Pt denies fever, chills, ear pain, sore throat, eye problem, SOB, cough, CP, abdominal pain, nausea, vomiting, diarrhea, dysuria, hematuria, arm problems, back pain, rash, headache, Hx of DM, Hx of high cholesterol, or Hx of pulmonary disorder.      The history is provided by the patient. No  was used.     Review of patient's allergies indicates:  No Known Allergies  Past Medical History:   Diagnosis Date    HTN (hypertension) 5/9/2013 2005    LBP radiating to right leg 5/9/2013    Smokes < 1 pack of cigarettes per day 5/9/2013    1/2 ppd     Past Surgical History:   Procedure Laterality Date    BACK SURGERY      c-spine fussion  2008     Family History   Problem Relation Age of Onset    Hypertension Mother      Social History     Tobacco Use    Smoking status: Current Every Day Smoker     Packs/day: 1.00     Types: Cigarettes    Smokeless tobacco: Never Used   Substance Use Topics    Alcohol use: No    Drug use: Yes     Types: Marijuana     Review of Systems   Constitutional: Negative for chills and fever.   HENT: Negative for ear pain and sore  Regardinyr/ fall  ----- Message from Laurel Horner sent at 2017 10:16 PM CDT -----  Patient Name: Shauna Hammonddontrell Murrell (Mom)  Specialist or PCP:Dr. Mohr  Pregnant (If Yes, how long?):no   Symptoms:Fall 2.5hr ago   Call Back #:[phone]   Is the patient’s permanent residence located in [place], [place], or a compact State? Yes [facility] [address]   Call Center Account #:[id number]   throat.    Eyes: Negative for pain.   Respiratory: Negative for cough and shortness of breath.    Cardiovascular: Negative for chest pain.   Gastrointestinal: Negative for abdominal pain, diarrhea, nausea and vomiting.   Genitourinary: Positive for flank pain (left). Negative for dysuria and hematuria.   Musculoskeletal: Positive for myalgias (left lower extremity). Negative for back pain.        (-) Arm problems   Skin: Negative for rash.   Neurological: Negative for weakness and headaches.   Hematological: Does not bruise/bleed easily.       Physical Exam     Initial Vitals [11/11/19 1641]   BP Pulse Resp Temp SpO2   (!) 177/107 80 19 98.2 °F (36.8 °C) 96 %      MAP       --         Physical Exam  The patient was examined specifically for the following:   General:No significant distress, Good color, Warm and dry. Head and neck:Scalp atraumatic, Neck supple. Neurological:Appropriate conversation, Gross motor deficits. Eyes:Conjugate gaze, Clear corneas. ENT: No epistaxis. Cardiac: Regular rate and rhythm, Grossly normal heart tones. Pulmonary: Wheezing, Rales. Gastrointestinal: Abdominal tenderness, Abdominal distention. Musculoskeletal: Extremity deformity, Apparent pain with range of motion of the joints. Skin: Rash.   The findings on examination were normal except for the following:  The patient has tenderness in the posterior axillary line on the left side with in the costal margin in the brim of the pelvis.  Palpation there reproduces the pain of the chief complaint.  There are no skin findings.  Patient's pain it is exacerbated severely with movement.  There is no midline lumbar pain or or midline lumbar pain with movement.  The patient is afebrile.  There are no right-sided symptoms. Lower extremity neurologic examination is grossly unremarkable.  ED Course   Procedures  Labs Reviewed - No data to display       Imaging Results    None       Medical decision making:  This patient's pain is well off the  midline.  It is isolated to the left lateral lumbar back.  It is sharper worse with movement.  I believe it is musculoskeletal.  The patient has no urinary symptoms. He has no nausea or vomiting there is no history of kidney stone.  There is no midline a right-sided pain.  I doubt bony abnormalities and aortic disease.  He has had a symptoms for 2 weeks.  I will try treating him with IM steroids I will have him follow up with primary care.                   I personally performed the services described in this documentation.  All medical record  entries made by the scribe are at my direction and in my presence.  Signed, Dr. Araujo                   Clinical Impression:       ICD-10-CM ICD-9-CM   1. Lumbar strain, initial encounter S39.012A 847.2                             Donta Araujo MD  11/11/19 1940

## 2021-05-17 ENCOUNTER — HOSPITAL ENCOUNTER (EMERGENCY)
Facility: HOSPITAL | Age: 56
Discharge: HOME | End: 2021-05-17
Attending: EMERGENCY MEDICINE
Payer: COMMERCIAL

## 2021-05-17 VITALS
SYSTOLIC BLOOD PRESSURE: 182 MMHG | HEIGHT: 73 IN | DIASTOLIC BLOOD PRESSURE: 94 MMHG | HEART RATE: 61 BPM | TEMPERATURE: 98 F | OXYGEN SATURATION: 97 % | BODY MASS INDEX: 29.82 KG/M2 | WEIGHT: 225 LBS | RESPIRATION RATE: 18 BRPM

## 2021-05-17 DIAGNOSIS — I10 HYPERTENSION, UNSPECIFIED TYPE: ICD-10-CM

## 2021-05-17 DIAGNOSIS — Z91.148 NONCOMPLIANCE WITH MEDICATIONS: ICD-10-CM

## 2021-05-17 DIAGNOSIS — G44.209 TENSION HEADACHE: Primary | ICD-10-CM

## 2021-05-17 PROCEDURE — 25000003 PHARM REV CODE 250: Mod: ER | Performed by: EMERGENCY MEDICINE

## 2021-05-17 PROCEDURE — 99284 EMERGENCY DEPT VISIT MOD MDM: CPT | Mod: ER

## 2021-05-17 RX ORDER — LISINOPRIL 20 MG/1
20 TABLET ORAL
Status: COMPLETED | OUTPATIENT
Start: 2021-05-17 | End: 2021-05-17

## 2021-05-17 RX ORDER — BUTALBITAL, ACETAMINOPHEN AND CAFFEINE 50; 325; 40 MG/1; MG/1; MG/1
1 TABLET ORAL EVERY 4 HOURS PRN
Qty: 20 TABLET | Refills: 0 | Status: SHIPPED | OUTPATIENT
Start: 2021-05-17 | End: 2021-06-16

## 2021-05-17 RX ORDER — GABAPENTIN 100 MG/1
100 CAPSULE ORAL 3 TIMES DAILY
Qty: 45 CAPSULE | Refills: 0 | Status: SHIPPED | OUTPATIENT
Start: 2021-05-17 | End: 2024-01-17

## 2021-05-17 RX ORDER — ALBUTEROL SULFATE 90 UG/1
1-2 AEROSOL, METERED RESPIRATORY (INHALATION) EVERY 6 HOURS PRN
Qty: 6.7 G | Refills: 0 | Status: SHIPPED | OUTPATIENT
Start: 2021-05-17 | End: 2021-06-05

## 2021-05-17 RX ORDER — LISINOPRIL 20 MG/1
20 TABLET ORAL DAILY
Qty: 90 TABLET | Refills: 3 | Status: SHIPPED | OUTPATIENT
Start: 2021-05-17 | End: 2021-06-05

## 2021-05-17 RX ADMIN — LISINOPRIL 20 MG: 20 TABLET ORAL at 07:05

## 2021-06-05 ENCOUNTER — HOSPITAL ENCOUNTER (OUTPATIENT)
Facility: HOSPITAL | Age: 56
Discharge: HOME | End: 2021-06-06
Attending: EMERGENCY MEDICINE | Admitting: EMERGENCY MEDICINE

## 2021-06-05 DIAGNOSIS — F17.210 SMOKES < 1 PACK OF CIGARETTES PER DAY: Primary | ICD-10-CM

## 2021-06-05 DIAGNOSIS — R79.89 ELEVATED TROPONIN: ICD-10-CM

## 2021-06-05 DIAGNOSIS — R42 DIZZY: ICD-10-CM

## 2021-06-05 DIAGNOSIS — I16.0 HYPERTENSIVE URGENCY: ICD-10-CM

## 2021-06-05 DIAGNOSIS — R79.89 ELEVATED TROPONIN I MEASUREMENT: ICD-10-CM

## 2021-06-05 DIAGNOSIS — I10 ESSENTIAL HYPERTENSION: ICD-10-CM

## 2021-06-05 DIAGNOSIS — I10 HYPERTENSION: ICD-10-CM

## 2021-06-05 PROBLEM — S16.1XXA CERVICAL STRAIN: Status: RESOLVED | Noted: 2017-04-29 | Resolved: 2021-06-05

## 2021-06-05 PROBLEM — E66.9 CLASS 1 OBESITY WITH SERIOUS COMORBIDITY IN ADULT: Chronic | Status: ACTIVE | Noted: 2021-06-05

## 2021-06-05 LAB
ALBUMIN SERPL BCP-MCNC: 4.2 G/DL (ref 3.5–5.2)
ALP SERPL-CCNC: 88 U/L (ref 55–135)
ALT SERPL W/O P-5'-P-CCNC: 17 U/L (ref 10–44)
AMPHET+METHAMPHET UR QL: NEGATIVE
ANION GAP SERPL CALC-SCNC: 9 MMOL/L (ref 8–16)
AST SERPL-CCNC: 24 U/L (ref 10–40)
BARBITURATES UR QL SCN>200 NG/ML: NORMAL
BASOPHILS # BLD AUTO: 0.03 K/UL (ref 0–0.2)
BASOPHILS NFR BLD: 0.5 % (ref 0–1.9)
BENZODIAZ UR QL SCN>200 NG/ML: NEGATIVE
BILIRUB SERPL-MCNC: 0.6 MG/DL (ref 0.1–1)
BILIRUB UR QL STRIP: NEGATIVE
BUN SERPL-MCNC: 16 MG/DL (ref 6–20)
BZE UR QL SCN: NEGATIVE
CALCIUM SERPL-MCNC: 9.6 MG/DL (ref 8.7–10.5)
CANNABINOIDS UR QL SCN: NORMAL
CHLORIDE SERPL-SCNC: 101 MMOL/L (ref 95–110)
CLARITY UR: CLEAR
CO2 SERPL-SCNC: 28 MMOL/L (ref 23–29)
COLOR UR: YELLOW
CREAT SERPL-MCNC: 1.2 MG/DL (ref 0.5–1.4)
CREAT UR-MCNC: 235.3 MG/DL (ref 23–375)
CTP QC/QA: YES
DIFFERENTIAL METHOD: NORMAL
EOSINOPHIL # BLD AUTO: 0.1 K/UL (ref 0–0.5)
EOSINOPHIL NFR BLD: 1.8 % (ref 0–8)
ERYTHROCYTE [DISTWIDTH] IN BLOOD BY AUTOMATED COUNT: 13.6 % (ref 11.5–14.5)
EST. GFR  (AFRICAN AMERICAN): >60 ML/MIN/1.73 M^2
EST. GFR  (NON AFRICAN AMERICAN): >60 ML/MIN/1.73 M^2
GLUCOSE SERPL-MCNC: 134 MG/DL (ref 70–110)
GLUCOSE UR QL STRIP: NEGATIVE
HCT VFR BLD AUTO: 46.8 % (ref 40–54)
HGB BLD-MCNC: 15.4 G/DL (ref 14–18)
HGB UR QL STRIP: NEGATIVE
IMM GRANULOCYTES # BLD AUTO: 0.02 K/UL (ref 0–0.04)
IMM GRANULOCYTES NFR BLD AUTO: 0.3 % (ref 0–0.5)
KETONES UR QL STRIP: NEGATIVE
LEUKOCYTE ESTERASE UR QL STRIP: NEGATIVE
LYMPHOCYTES # BLD AUTO: 2 K/UL (ref 1–4.8)
LYMPHOCYTES NFR BLD: 31.5 % (ref 18–48)
MCH RBC QN AUTO: 27.9 PG (ref 27–31)
MCHC RBC AUTO-ENTMCNC: 32.9 G/DL (ref 32–36)
MCV RBC AUTO: 85 FL (ref 82–98)
METHADONE UR QL SCN>300 NG/ML: NEGATIVE
MONOCYTES # BLD AUTO: 0.6 K/UL (ref 0.3–1)
MONOCYTES NFR BLD: 10 % (ref 4–15)
NEUTROPHILS # BLD AUTO: 3.5 K/UL (ref 1.8–7.7)
NEUTROPHILS NFR BLD: 55.9 % (ref 38–73)
NITRITE UR QL STRIP: NEGATIVE
NRBC BLD-RTO: 0 /100 WBC
OPIATES UR QL SCN: NEGATIVE
PCP UR QL SCN>25 NG/ML: NEGATIVE
PH UR STRIP: 6 [PH] (ref 5–8)
PLATELET # BLD AUTO: 283 K/UL (ref 150–450)
PMV BLD AUTO: 11.2 FL (ref 9.2–12.9)
POTASSIUM SERPL-SCNC: 3.1 MMOL/L (ref 3.5–5.1)
PROT SERPL-MCNC: 7.2 G/DL (ref 6–8.4)
PROT UR QL STRIP: ABNORMAL
RBC # BLD AUTO: 5.51 M/UL (ref 4.6–6.2)
SARS-COV-2 RDRP RESP QL NAA+PROBE: NEGATIVE
SODIUM SERPL-SCNC: 138 MMOL/L (ref 136–145)
SP GR UR STRIP: 1.02 (ref 1–1.03)
TOXICOLOGY INFORMATION: NORMAL
TROPONIN I SERPL DL<=0.01 NG/ML-MCNC: 0.03 NG/ML (ref 0–0.03)
TROPONIN I SERPL DL<=0.01 NG/ML-MCNC: 0.04 NG/ML (ref 0–0.03)
TSH SERPL DL<=0.005 MIU/L-ACNC: 0.62 UIU/ML (ref 0.4–4)
URN SPEC COLLECT METH UR: ABNORMAL
UROBILINOGEN UR STRIP-ACNC: NEGATIVE EU/DL
WBC # BLD AUTO: 6.22 K/UL (ref 3.9–12.7)

## 2021-06-05 PROCEDURE — 25000003 PHARM REV CODE 250: Performed by: EMERGENCY MEDICINE

## 2021-06-05 PROCEDURE — 93010 ELECTROCARDIOGRAM REPORT: CPT | Mod: ,,, | Performed by: INTERNAL MEDICINE

## 2021-06-05 PROCEDURE — 93005 ELECTROCARDIOGRAM TRACING: CPT

## 2021-06-05 PROCEDURE — U0002 COVID-19 LAB TEST NON-CDC: HCPCS | Performed by: EMERGENCY MEDICINE

## 2021-06-05 PROCEDURE — G0378 HOSPITAL OBSERVATION PER HR: HCPCS

## 2021-06-05 PROCEDURE — 25000003 PHARM REV CODE 250: Performed by: HOSPITALIST

## 2021-06-05 PROCEDURE — 85025 COMPLETE CBC W/AUTO DIFF WBC: CPT | Performed by: EMERGENCY MEDICINE

## 2021-06-05 PROCEDURE — 96372 THER/PROPH/DIAG INJ SC/IM: CPT

## 2021-06-05 PROCEDURE — 99285 EMERGENCY DEPT VISIT HI MDM: CPT | Mod: 25

## 2021-06-05 PROCEDURE — 81003 URINALYSIS AUTO W/O SCOPE: CPT | Mod: 59 | Performed by: EMERGENCY MEDICINE

## 2021-06-05 PROCEDURE — 80053 COMPREHEN METABOLIC PANEL: CPT | Performed by: EMERGENCY MEDICINE

## 2021-06-05 PROCEDURE — 84484 ASSAY OF TROPONIN QUANT: CPT | Mod: 91 | Performed by: EMERGENCY MEDICINE

## 2021-06-05 PROCEDURE — 63600175 PHARM REV CODE 636 W HCPCS: Performed by: HOSPITALIST

## 2021-06-05 PROCEDURE — 84484 ASSAY OF TROPONIN QUANT: CPT | Performed by: HOSPITALIST

## 2021-06-05 PROCEDURE — 93010 EKG 12-LEAD: ICD-10-PCS | Mod: ,,, | Performed by: INTERNAL MEDICINE

## 2021-06-05 PROCEDURE — 80307 DRUG TEST PRSMV CHEM ANLYZR: CPT | Performed by: EMERGENCY MEDICINE

## 2021-06-05 PROCEDURE — 84443 ASSAY THYROID STIM HORMONE: CPT | Performed by: EMERGENCY MEDICINE

## 2021-06-05 RX ORDER — ACETAMINOPHEN 325 MG/1
650 TABLET ORAL EVERY 6 HOURS PRN
Status: DISCONTINUED | OUTPATIENT
Start: 2021-06-05 | End: 2021-06-06 | Stop reason: HOSPADM

## 2021-06-05 RX ORDER — AMLODIPINE BESYLATE 5 MG/1
5 TABLET ORAL DAILY
Status: ON HOLD | COMMUNITY
Start: 2021-06-03 | End: 2021-06-06 | Stop reason: HOSPADM

## 2021-06-05 RX ORDER — CLONIDINE HYDROCHLORIDE 0.1 MG/1
0.1 TABLET ORAL EVERY 8 HOURS PRN
Status: DISCONTINUED | OUTPATIENT
Start: 2021-06-05 | End: 2021-06-06 | Stop reason: HOSPADM

## 2021-06-05 RX ORDER — ONDANSETRON 2 MG/ML
4 INJECTION INTRAMUSCULAR; INTRAVENOUS EVERY 6 HOURS PRN
Status: DISCONTINUED | OUTPATIENT
Start: 2021-06-05 | End: 2021-06-06 | Stop reason: HOSPADM

## 2021-06-05 RX ORDER — LOSARTAN POTASSIUM AND HYDROCHLOROTHIAZIDE 12.5; 5 MG/1; MG/1
1 TABLET ORAL DAILY
Status: ON HOLD | COMMUNITY
Start: 2021-06-03 | End: 2021-06-06 | Stop reason: SDUPTHER

## 2021-06-05 RX ORDER — ASPIRIN 325 MG
325 TABLET ORAL
Status: COMPLETED | OUTPATIENT
Start: 2021-06-05 | End: 2021-06-05

## 2021-06-05 RX ORDER — TALC
6 POWDER (GRAM) TOPICAL NIGHTLY PRN
Status: DISCONTINUED | OUTPATIENT
Start: 2021-06-05 | End: 2021-06-06 | Stop reason: HOSPADM

## 2021-06-05 RX ORDER — POTASSIUM CHLORIDE 20 MEQ/1
20 TABLET, EXTENDED RELEASE ORAL
Status: COMPLETED | OUTPATIENT
Start: 2021-06-05 | End: 2021-06-05

## 2021-06-05 RX ORDER — LOSARTAN POTASSIUM 25 MG/1
50 TABLET ORAL DAILY
Status: DISCONTINUED | OUTPATIENT
Start: 2021-06-06 | End: 2021-06-06

## 2021-06-05 RX ORDER — AMLODIPINE BESYLATE 5 MG/1
5 TABLET ORAL DAILY
Status: DISCONTINUED | OUTPATIENT
Start: 2021-06-05 | End: 2021-06-06

## 2021-06-05 RX ORDER — SODIUM CHLORIDE 0.9 % (FLUSH) 0.9 %
10 SYRINGE (ML) INJECTION
Status: DISCONTINUED | OUTPATIENT
Start: 2021-06-05 | End: 2021-06-06 | Stop reason: HOSPADM

## 2021-06-05 RX ORDER — GABAPENTIN 100 MG/1
100 CAPSULE ORAL 3 TIMES DAILY
Status: DISCONTINUED | OUTPATIENT
Start: 2021-06-05 | End: 2021-06-06 | Stop reason: HOSPADM

## 2021-06-05 RX ORDER — AMLODIPINE BESYLATE 5 MG/1
5 TABLET ORAL DAILY
Status: DISCONTINUED | OUTPATIENT
Start: 2021-06-06 | End: 2021-06-05

## 2021-06-05 RX ORDER — ENOXAPARIN SODIUM 100 MG/ML
40 INJECTION SUBCUTANEOUS EVERY 24 HOURS
Status: DISCONTINUED | OUTPATIENT
Start: 2021-06-05 | End: 2021-06-06 | Stop reason: HOSPADM

## 2021-06-05 RX ADMIN — ENOXAPARIN SODIUM 40 MG: 40 INJECTION SUBCUTANEOUS at 09:06

## 2021-06-05 RX ADMIN — ASPIRIN 325 MG ORAL TABLET 325 MG: 325 PILL ORAL at 06:06

## 2021-06-05 RX ADMIN — POTASSIUM CHLORIDE 20 MEQ: 1500 TABLET, EXTENDED RELEASE ORAL at 05:06

## 2021-06-05 RX ADMIN — AMLODIPINE BESYLATE 5 MG: 5 TABLET ORAL at 07:06

## 2021-06-05 RX ADMIN — ACETAMINOPHEN 650 MG: 325 TABLET ORAL at 07:06

## 2021-06-05 RX ADMIN — CLONIDINE HYDROCHLORIDE 0.1 MG: 0.1 TABLET ORAL at 07:06

## 2021-06-05 RX ADMIN — GABAPENTIN 100 MG: 100 CAPSULE ORAL at 09:06

## 2021-06-06 VITALS
HEART RATE: 70 BPM | BODY MASS INDEX: 28.87 KG/M2 | OXYGEN SATURATION: 97 % | RESPIRATION RATE: 16 BRPM | TEMPERATURE: 98 F | WEIGHT: 217.81 LBS | HEIGHT: 73 IN | SYSTOLIC BLOOD PRESSURE: 141 MMHG | DIASTOLIC BLOOD PRESSURE: 94 MMHG

## 2021-06-06 PROBLEM — I16.0 HYPERTENSIVE URGENCY: Status: ACTIVE | Noted: 2021-06-06

## 2021-06-06 LAB
ASCENDING AORTA: 3.38 CM
AV INDEX (PROSTH): 1.02
AV MEAN GRADIENT: 3 MMHG
AV PEAK GRADIENT: 4 MMHG
AV VALVE AREA: 4.05 CM2
AV VELOCITY RATIO: 0.77
BSA FOR ECHO PROCEDURE: 2.26 M2
CHOLEST SERPL-MCNC: 177 MG/DL (ref 120–199)
CHOLEST/HDLC SERPL: 3.5 {RATIO} (ref 2–5)
CV ECHO LV RWT: 0.79 CM
DOP CALC AO PEAK VEL: 1.03 M/S
DOP CALC AO VTI: 16.69 CM
DOP CALC LVOT AREA: 4 CM2
DOP CALC LVOT DIAMETER: 2.25 CM
DOP CALC LVOT PEAK VEL: 0.79 M/S
DOP CALC LVOT STROKE VOLUME: 67.6 CM3
DOP CALCLVOT PEAK VEL VTI: 17.01 CM
E WAVE DECELERATION TIME: 145.08 MSEC
E/A RATIO: 0.74
E/E' RATIO: 8.6 M/S
ECHO LV POSTERIOR WALL: 1.9 CM (ref 0.6–1.1)
EJECTION FRACTION: 60 %
FRACTIONAL SHORTENING: 14 % (ref 28–44)
HDLC SERPL-MCNC: 51 MG/DL (ref 40–75)
HDLC SERPL: 28.8 % (ref 20–50)
INTERVENTRICULAR SEPTUM: 1.7 CM (ref 0.6–1.1)
IVRT: 129.4 MSEC
LA MAJOR: 4.81 CM
LA MINOR: 4.43 CM
LA WIDTH: 6.12 CM
LDLC SERPL CALC-MCNC: 103.2 MG/DL (ref 63–159)
LEFT ATRIUM SIZE: 4.57 CM
LEFT ATRIUM VOLUME INDEX: 49.2 ML/M2
LEFT ATRIUM VOLUME: 109.65 CM3
LEFT INTERNAL DIMENSION IN SYSTOLE: 4.13 CM (ref 2.1–4)
LEFT VENTRICLE DIASTOLIC VOLUME INDEX: 48.51 ML/M2
LEFT VENTRICLE DIASTOLIC VOLUME: 108.18 ML
LEFT VENTRICLE MASS INDEX: 181 G/M2
LEFT VENTRICLE SYSTOLIC VOLUME INDEX: 33.9 ML/M2
LEFT VENTRICLE SYSTOLIC VOLUME: 75.59 ML
LEFT VENTRICULAR INTERNAL DIMENSION IN DIASTOLE: 4.81 CM (ref 3.5–6)
LEFT VENTRICULAR MASS: 402.9 G
LV LATERAL E/E' RATIO: 8.6 M/S
LV SEPTAL E/E' RATIO: 8.6 M/S
MV PEAK A VEL: 0.58 M/S
MV PEAK E VEL: 0.43 M/S
MV STENOSIS PRESSURE HALF TIME: 42.07 MS
MV VALVE AREA P 1/2 METHOD: 5.23 CM2
NONHDLC SERPL-MCNC: 126 MG/DL
PISA TR MAX VEL: 2.22 M/S
POTASSIUM SERPL-SCNC: 3.6 MMOL/L (ref 3.5–5.1)
PULM VEIN S/D RATIO: 1.61
PV PEAK D VEL: 0.28 M/S
PV PEAK S VEL: 0.45 M/S
PV PEAK VELOCITY: 0.77 CM/S
RA MAJOR: 4.84 CM
RA PRESSURE: 8 MMHG
RA WIDTH: 3.53 CM
RIGHT VENTRICULAR END-DIASTOLIC DIMENSION: 3.51 CM
RV TISSUE DOPPLER FREE WALL SYSTOLIC VELOCITY 1 (APICAL 4 CHAMBER VIEW): 10.69 CM/S
STJ: 3.2 CM
TDI LATERAL: 0.05 M/S
TDI SEPTAL: 0.05 M/S
TDI: 0.05 M/S
TR MAX PG: 20 MMHG
TRICUSPID ANNULAR PLANE SYSTOLIC EXCURSION: 1.92 CM
TRIGL SERPL-MCNC: 114 MG/DL (ref 30–150)
TROPONIN I SERPL DL<=0.01 NG/ML-MCNC: 0.03 NG/ML (ref 0–0.03)
TV REST PULMONARY ARTERY PRESSURE: 28 MMHG

## 2021-06-06 PROCEDURE — 84132 ASSAY OF SERUM POTASSIUM: CPT | Performed by: NURSE PRACTITIONER

## 2021-06-06 PROCEDURE — 80061 LIPID PANEL: CPT | Performed by: HOSPITALIST

## 2021-06-06 PROCEDURE — 25000003 PHARM REV CODE 250: Performed by: NURSE PRACTITIONER

## 2021-06-06 PROCEDURE — 25000003 PHARM REV CODE 250: Performed by: HOSPITALIST

## 2021-06-06 PROCEDURE — 84484 ASSAY OF TROPONIN QUANT: CPT | Performed by: HOSPITALIST

## 2021-06-06 PROCEDURE — G0378 HOSPITAL OBSERVATION PER HR: HCPCS

## 2021-06-06 PROCEDURE — 36415 COLL VENOUS BLD VENIPUNCTURE: CPT | Performed by: NURSE PRACTITIONER

## 2021-06-06 RX ORDER — AMLODIPINE BESYLATE 10 MG/1
10 TABLET ORAL DAILY
Qty: 30 TABLET | Refills: 6 | Status: SHIPPED | OUTPATIENT
Start: 2021-06-07

## 2021-06-06 RX ORDER — AMLODIPINE BESYLATE 5 MG/1
10 TABLET ORAL DAILY
Status: DISCONTINUED | OUTPATIENT
Start: 2021-06-06 | End: 2021-06-06 | Stop reason: HOSPADM

## 2021-06-06 RX ORDER — LOSARTAN POTASSIUM AND HYDROCHLOROTHIAZIDE 12.5; 5 MG/1; MG/1
1 TABLET ORAL DAILY
Qty: 30 TABLET | Refills: 6 | Status: SHIPPED | OUTPATIENT
Start: 2021-06-06 | End: 2021-12-04

## 2021-06-06 RX ORDER — LOSARTAN POTASSIUM 25 MG/1
50 TABLET ORAL DAILY
Status: DISCONTINUED | OUTPATIENT
Start: 2021-06-06 | End: 2021-06-06 | Stop reason: HOSPADM

## 2021-06-06 RX ADMIN — LOSARTAN POTASSIUM 50 MG: 25 TABLET, FILM COATED ORAL at 08:06

## 2021-06-06 RX ADMIN — ACETAMINOPHEN 650 MG: 325 TABLET ORAL at 08:06

## 2021-06-06 RX ADMIN — GABAPENTIN 100 MG: 100 CAPSULE ORAL at 08:06

## 2021-06-06 RX ADMIN — AMLODIPINE BESYLATE 10 MG: 5 TABLET ORAL at 08:06

## 2022-06-29 ENCOUNTER — HOSPITAL ENCOUNTER (EMERGENCY)
Facility: HOSPITAL | Age: 57
Discharge: HOME | End: 2022-06-29
Attending: EMERGENCY MEDICINE
Payer: COMMERCIAL

## 2022-06-29 VITALS
RESPIRATION RATE: 17 BRPM | OXYGEN SATURATION: 98 % | HEIGHT: 73 IN | HEART RATE: 72 BPM | BODY MASS INDEX: 29.82 KG/M2 | TEMPERATURE: 99 F | WEIGHT: 225 LBS | DIASTOLIC BLOOD PRESSURE: 90 MMHG | SYSTOLIC BLOOD PRESSURE: 164 MMHG

## 2022-06-29 DIAGNOSIS — M25.552 LEFT HIP PAIN: Primary | ICD-10-CM

## 2022-06-29 DIAGNOSIS — M16.0 ARTHRITIS OF BOTH HIPS: ICD-10-CM

## 2022-06-29 DIAGNOSIS — M25.552 HIP PAIN, ACUTE, LEFT: ICD-10-CM

## 2022-06-29 PROCEDURE — 63600175 PHARM REV CODE 636 W HCPCS: Performed by: PHYSICIAN ASSISTANT

## 2022-06-29 PROCEDURE — 99284 EMERGENCY DEPT VISIT MOD MDM: CPT

## 2022-06-29 PROCEDURE — 96372 THER/PROPH/DIAG INJ SC/IM: CPT | Performed by: PHYSICIAN ASSISTANT

## 2022-06-29 RX ORDER — MELOXICAM 7.5 MG/1
7.5 TABLET ORAL DAILY
Qty: 30 TABLET | Refills: 0 | Status: SHIPPED | OUTPATIENT
Start: 2022-06-29 | End: 2024-01-17

## 2022-06-29 RX ORDER — KETOROLAC TROMETHAMINE 30 MG/ML
30 INJECTION, SOLUTION INTRAMUSCULAR; INTRAVENOUS
Status: COMPLETED | OUTPATIENT
Start: 2022-06-29 | End: 2022-06-29

## 2022-06-29 RX ADMIN — KETOROLAC TROMETHAMINE 30 MG: 30 INJECTION, SOLUTION INTRAMUSCULAR at 04:06

## 2022-06-29 NOTE — Clinical Note
"Ramiro"Deejay Ca was seen and treated in our emergency department on 6/29/2022.  He may return to work on 07/01/2022.       If you have any questions or concerns, please don't hesitate to call.      SAMANTHA Hernandez"

## 2022-06-29 NOTE — ED PROVIDER NOTES
Encounter Date: 6/29/2022    SCRIBE #1 NOTE: I, Vernell Heranndez, am scribing for, and in the presence of,  SAMANTHA Hernandez. I have scribed the following portions of the note - Other sections scribed: HPI, ROS, PE.       History     Chief Complaint   Patient presents with    Hip Pain     Patient presents to ED c/o hip pain, pt reports left hip pain 10/10 x 1 month, reports pain worsening, today difficulty ambulating. Patient denies taking medication for pain   Denies bowel and bladder changes.      Ramiro Ca, a 57 y.o. male with a pertinent past medical history of HTN and lower back pain radiating to right leg, presents to the ED with intermittent left hip pain that began 1 month ago. Pt reports that the pain worsened yesterday and rates his pain as a 10/10. His pain is worsened with movement. He denies any past similar experiences. Denies recent surgeries, falls, trauma, or injury. No other exacerbating or alleviating factors. Patient denies fever, or any other associated symptoms.     The history is provided by the patient. No  was used.     Review of patient's allergies indicates:  No Known Allergies  Past Medical History:   Diagnosis Date    HTN (hypertension) 5/9/2013 2005    LBP radiating to right leg 5/9/2013    Smokes < 1 pack of cigarettes per day 5/9/2013    1/2 ppd     Past Surgical History:   Procedure Laterality Date    BACK SURGERY      c-spine fussion  2008     Family History   Problem Relation Age of Onset    Hypertension Mother      Social History     Tobacco Use    Smoking status: Current Every Day Smoker     Packs/day: 1.00     Types: Cigarettes    Smokeless tobacco: Never Used   Substance Use Topics    Alcohol use: No    Drug use: Yes     Types: Marijuana     Review of Systems   Constitutional: Negative for fever.   HENT: Negative for congestion.    Eyes: Negative for photophobia.   Respiratory: Negative for choking.    Cardiovascular: Negative for  palpitations.   Gastrointestinal: Negative for abdominal pain.   Genitourinary: Negative for dysuria.   Musculoskeletal:        (+) Left hip pain   Skin: Negative for rash.   Neurological: Negative for headaches.       Physical Exam     Initial Vitals [06/29/22 1538]   BP Pulse Resp Temp SpO2   (!) 145/90 68 17 98.9 °F (37.2 °C) 98 %      MAP       --         Physical Exam    Nursing note and vitals reviewed.  Constitutional: He appears well-developed and well-nourished. He is not diaphoretic. No distress.   HENT:   Head: Normocephalic and atraumatic.   Mouth/Throat: Oropharynx is clear and moist.   Eyes: EOM are normal. Pupils are equal, round, and reactive to light.   Neck: Neck supple.   Cardiovascular: Normal rate and regular rhythm.   Pulmonary/Chest: Breath sounds normal. No respiratory distress.   Abdominal: Abdomen is soft. Bowel sounds are normal.   Musculoskeletal:         General: No edema.      Cervical back: Neck supple.      Comments: Pain with movement of the left hip.     Neurological: He is alert and oriented to person, place, and time.   Skin: Skin is warm and dry.   Psychiatric: He has a normal mood and affect.         ED Course   Procedures  Labs Reviewed - No data to display       Imaging Results          X-Ray Hip 2 or 3 views Left (with Pelvis when performed) (Final result)  Result time 06/29/22 17:16:03    Final result by Ravin Alves MD (06/29/22 17:16:03)                 Impression:      No acute displaced fracture seen.      Electronically signed by: Ravin Alves MD  Date:    06/29/2022  Time:    17:16             Narrative:    EXAMINATION:  XR HIP WITH PELVIS WHEN PERFORMED, 2 OR 3 VIEWS LEFT    CLINICAL HISTORY:  Pain in left hip    TECHNIQUE:  AP view of the pelvis and frog leg lateral view of the left hip were performed.    COMPARISON:  None    FINDINGS:  No evidence of acute displaced fracture, dislocation, or osseous destructive process.  Mild degenerative changes are seen  involving the bilateral hips.                                 Medications   ketorolac injection 30 mg (30 mg Intramuscular Given 6/29/22 4363)     Medical Decision Making:   History:   Old Medical Records: I decided to obtain old medical records.  Initial Assessment:   Hip x-rays revealed bilateral arthritis.  Patient has full range of motion and is afebrile there 5 doubt septic joint at this time.  I suspect patient's pain is due to arthritis.  Patient given Toradol injections in ED.  Will discharge patient home with Mobic and recommend follow-up with Orthopedic.  Patient is stable for discharge.          Scribe Attestation:   Scribe #1: I performed the above scribed service and the documentation accurately describes the services I performed. I attest to the accuracy of the note.                 Clinical Impression:   Final diagnoses:  [M25.552] Hip pain, acute, left  [M25.552] Left hip pain (Primary)  [M16.0] Arthritis of both hips          ED Disposition Condition    Discharge Stable        ED Prescriptions     Medication Sig Dispense Start Date End Date Auth. Provider    meloxicam (MOBIC) 7.5 MG tablet Take 1 tablet (7.5 mg total) by mouth once daily. 30 tablet 6/29/2022  SAMANTHA Hernandez        Follow-up Information     Follow up With Specialties Details Why Contact Info    Dequan Alves MD Orthopedic Surgery Schedule an appointment as soon as possible for a visit   [address] [facility]  [address] [place] [address]  [phone]         I,Perla Mccoy PA-C , personally performed the services described in this documentation. All medical record entries made by the scribe were at my direction and in my presence. I have reviewed the chart and agree that the record reflects my personal performance and is accurate and complete.       SAMANTHA Hernandez  06/29/22 1950

## 2023-12-04 ENCOUNTER — HOSPITAL ENCOUNTER (EMERGENCY)
Facility: HOSPITAL | Age: 58
Discharge: LEFT BEFORE DISCHARGE | End: 2023-12-04
Attending: EMERGENCY MEDICINE
Payer: COMMERCIAL

## 2023-12-04 VITALS
WEIGHT: 225 LBS | HEART RATE: 79 BPM | TEMPERATURE: 98 F | HEIGHT: 73 IN | OXYGEN SATURATION: 100 % | SYSTOLIC BLOOD PRESSURE: 202 MMHG | BODY MASS INDEX: 29.82 KG/M2 | RESPIRATION RATE: 16 BRPM | DIASTOLIC BLOOD PRESSURE: 112 MMHG

## 2023-12-04 DIAGNOSIS — R51.9 ACUTE NONINTRACTABLE HEADACHE, UNSPECIFIED HEADACHE TYPE: Primary | ICD-10-CM

## 2023-12-04 DIAGNOSIS — R07.9 CHEST PAIN: ICD-10-CM

## 2023-12-04 DIAGNOSIS — N19 RENAL FAILURE, UNSPECIFIED CHRONICITY: ICD-10-CM

## 2023-12-04 LAB
ALBUMIN SERPL BCP-MCNC: 4.1 G/DL (ref 3.5–5.2)
ALP SERPL-CCNC: 65 U/L (ref 55–135)
ALT SERPL W/O P-5'-P-CCNC: 13 U/L (ref 10–44)
ANION GAP SERPL CALC-SCNC: 14 MMOL/L (ref 8–16)
AST SERPL-CCNC: 24 U/L (ref 10–40)
BASOPHILS # BLD AUTO: 0.06 K/UL (ref 0–0.2)
BASOPHILS NFR BLD: 0.8 % (ref 0–1.9)
BILIRUB SERPL-MCNC: 0.3 MG/DL (ref 0.1–1)
BNP SERPL-MCNC: 175 PG/ML (ref 0–99)
BUN SERPL-MCNC: 73 MG/DL (ref 6–20)
CALCIUM SERPL-MCNC: 7.6 MG/DL (ref 8.7–10.5)
CHLORIDE SERPL-SCNC: 102 MMOL/L (ref 95–110)
CO2 SERPL-SCNC: 26 MMOL/L (ref 23–29)
CREAT SERPL-MCNC: 6.5 MG/DL (ref 0.5–1.4)
DIFFERENTIAL METHOD: ABNORMAL
EOSINOPHIL # BLD AUTO: 0.1 K/UL (ref 0–0.5)
EOSINOPHIL NFR BLD: 1.3 % (ref 0–8)
ERYTHROCYTE [DISTWIDTH] IN BLOOD BY AUTOMATED COUNT: 13.5 % (ref 11.5–14.5)
EST. GFR  (NO RACE VARIABLE): 9 ML/MIN/1.73 M^2
GLUCOSE SERPL-MCNC: 87 MG/DL (ref 70–110)
HCT VFR BLD AUTO: 30.9 % (ref 40–54)
HGB BLD-MCNC: 10.5 G/DL (ref 14–18)
IMM GRANULOCYTES # BLD AUTO: 0.03 K/UL (ref 0–0.04)
IMM GRANULOCYTES NFR BLD AUTO: 0.4 % (ref 0–0.5)
LYMPHOCYTES # BLD AUTO: 2.3 K/UL (ref 1–4.8)
LYMPHOCYTES NFR BLD: 29.5 % (ref 18–48)
MCH RBC QN AUTO: 28.4 PG (ref 27–31)
MCHC RBC AUTO-ENTMCNC: 34 G/DL (ref 32–36)
MCV RBC AUTO: 84 FL (ref 82–98)
MONOCYTES # BLD AUTO: 0.8 K/UL (ref 0.3–1)
MONOCYTES NFR BLD: 10.1 % (ref 4–15)
NEUTROPHILS # BLD AUTO: 4.6 K/UL (ref 1.8–7.7)
NEUTROPHILS NFR BLD: 57.9 % (ref 38–73)
NRBC BLD-RTO: 0 /100 WBC
PLATELET # BLD AUTO: 253 K/UL (ref 150–450)
PMV BLD AUTO: 11.3 FL (ref 9.2–12.9)
POTASSIUM SERPL-SCNC: 3.1 MMOL/L (ref 3.5–5.1)
PROT SERPL-MCNC: 7.2 G/DL (ref 6–8.4)
RBC # BLD AUTO: 3.7 M/UL (ref 4.6–6.2)
SODIUM SERPL-SCNC: 142 MMOL/L (ref 136–145)
TROPONIN I SERPL DL<=0.01 NG/ML-MCNC: 0.05 NG/ML (ref 0–0.03)
TROPONIN I SERPL DL<=0.01 NG/ML-MCNC: 0.07 NG/ML (ref 0–0.03)
WBC # BLD AUTO: 7.91 K/UL (ref 3.9–12.7)

## 2023-12-04 PROCEDURE — 99285 EMERGENCY DEPT VISIT HI MDM: CPT | Mod: 25

## 2023-12-04 PROCEDURE — 63600175 PHARM REV CODE 636 W HCPCS: Performed by: EMERGENCY MEDICINE

## 2023-12-04 PROCEDURE — 85025 COMPLETE CBC W/AUTO DIFF WBC: CPT | Performed by: NURSE PRACTITIONER

## 2023-12-04 PROCEDURE — 83880 ASSAY OF NATRIURETIC PEPTIDE: CPT | Performed by: NURSE PRACTITIONER

## 2023-12-04 PROCEDURE — 84484 ASSAY OF TROPONIN QUANT: CPT | Performed by: NURSE PRACTITIONER

## 2023-12-04 PROCEDURE — 96374 THER/PROPH/DIAG INJ IV PUSH: CPT

## 2023-12-04 PROCEDURE — 96375 TX/PRO/DX INJ NEW DRUG ADDON: CPT

## 2023-12-04 PROCEDURE — 93005 ELECTROCARDIOGRAM TRACING: CPT

## 2023-12-04 PROCEDURE — 93010 EKG 12-LEAD: ICD-10-PCS | Mod: ,,, | Performed by: INTERNAL MEDICINE

## 2023-12-04 PROCEDURE — 25000003 PHARM REV CODE 250: Performed by: EMERGENCY MEDICINE

## 2023-12-04 PROCEDURE — 93010 ELECTROCARDIOGRAM REPORT: CPT | Mod: ,,, | Performed by: INTERNAL MEDICINE

## 2023-12-04 PROCEDURE — 80053 COMPREHEN METABOLIC PANEL: CPT | Performed by: NURSE PRACTITIONER

## 2023-12-04 RX ORDER — PROCHLORPERAZINE EDISYLATE 5 MG/ML
10 INJECTION INTRAMUSCULAR; INTRAVENOUS ONCE
Status: COMPLETED | OUTPATIENT
Start: 2023-12-04 | End: 2023-12-04

## 2023-12-04 RX ORDER — KETOROLAC TROMETHAMINE 30 MG/ML
15 INJECTION, SOLUTION INTRAMUSCULAR; INTRAVENOUS
Status: COMPLETED | OUTPATIENT
Start: 2023-12-04 | End: 2023-12-04

## 2023-12-04 RX ORDER — LOSARTAN POTASSIUM 25 MG/1
50 TABLET ORAL ONCE
Status: COMPLETED | OUTPATIENT
Start: 2023-12-04 | End: 2023-12-04

## 2023-12-04 RX ORDER — AMLODIPINE BESYLATE 5 MG/1
10 TABLET ORAL
Status: COMPLETED | OUTPATIENT
Start: 2023-12-04 | End: 2023-12-04

## 2023-12-04 RX ADMIN — LOSARTAN POTASSIUM 50 MG: 25 TABLET, FILM COATED ORAL at 08:12

## 2023-12-04 RX ADMIN — KETOROLAC TROMETHAMINE 15 MG: 30 INJECTION, SOLUTION INTRAMUSCULAR; INTRAVENOUS at 08:12

## 2023-12-04 RX ADMIN — AMLODIPINE BESYLATE 10 MG: 5 TABLET ORAL at 08:12

## 2023-12-04 RX ADMIN — PROCHLORPERAZINE EDISYLATE 10 MG: 5 INJECTION INTRAMUSCULAR; INTRAVENOUS at 09:12

## 2023-12-04 NOTE — LETTER
Patient: Ramiro Ca  YOB: 1965  Date: 12/4/2023 Time: 11:16 PM  Location: [facility]    Leaving the Hospital Against Medical Advice    Chart #:[id number]    This will certify that I, the undersigned,    ______________________________________________________________________    A patient in the above named medical center, having requested discharge and removal from the medical center against the advice of my attending physician(s), hereby release [facility], its physicians, officers and employees, severally and individually, from any and all liability of any nature whatsoever for any injury or harm or complication of any kind that may result directly or indirectly, by reason of my terminating my stay as a patient at [facility] and my departure from [facility], and hereby waive any and all rights of action I may now have or later acquire as a result of my voluntary departure from [facility] and the termination of my stay as a patient therein.    This release is made with the full knowledge of the danger that may result from the action which I am taking.      Date:_______________________                         ___________________________                                                                                    Patient/Legal Representative    Witness:        ____________________________                          ___________________________  Nurse                                                                        Physician

## 2023-12-05 NOTE — ED PROVIDER NOTES
Encounter Date: 12/4/2023       History     Chief Complaint   Patient presents with    Chest Pain     Pt presents to the ED with c/o left-anteriot wall CP that is intermittent and sharp since approx 1400 today. Endorses radiation of pain to posterior neck and occipital region on head that feels like pressure. Denies n/v or vision changes. Denies worsening.alleviating factors. States he has been out of his HTN meds for 4 days. Left arm /126 and right arm 220/122     58-year-old male with a history of hypertension presenting with headache, chest pain. CP on on left anterior wall, intermittent and sharp since approx 1400 today. Endorses radiation of pain to posterior neck and occipital region on head that feels like pressure. Denies n/v or vision changes. Denies worsening.alleviating factors. States he has been out of his HTN meds for 4 days.  Denies fevers, chills, cough, shortness of breath.  Denies numbness, weakness.  Denies changes in urination or frequency.  Previously in usual state of health.      Review of patient's allergies indicates:  No Known Allergies  Past Medical History:   Diagnosis Date    HTN (hypertension) 5/9/2013 2005    LBP radiating to right leg 5/9/2013    Smokes < 1 pack of cigarettes per day 5/9/2013    1/2 ppd     Past Surgical History:   Procedure Laterality Date    BACK SURGERY      c-spine fussion  2008     Family History   Problem Relation Age of Onset    Hypertension Mother      Social History     Tobacco Use    Smoking status: Every Day     Current packs/day: 1.00     Types: Cigarettes    Smokeless tobacco: Never   Substance Use Topics    Alcohol use: No    Drug use: Yes     Types: Marijuana     Review of Systems   Constitutional:  Negative for chills and fever.   HENT:  Negative for sore throat.    Respiratory:  Positive for chest tightness. Negative for shortness of breath.    Cardiovascular:  Positive for chest pain.   Gastrointestinal:  Negative for nausea.    Genitourinary:  Negative for decreased urine volume and dysuria.   Musculoskeletal:  Negative for back pain.   Skin:  Negative for rash.   Neurological:  Positive for headaches. Negative for weakness.       Physical Exam     Initial Vitals [12/04/23 1911]   BP Pulse Resp Temp SpO2   (!) 227/126 84 16 98.2 °F (36.8 °C) 98 %      MAP       --         Physical Exam    Nursing note and vitals reviewed.  Constitutional: He appears well-developed and well-nourished. He is not diaphoretic. No distress.   HENT:   Head: Normocephalic and atraumatic.   Eyes: Conjunctivae and EOM are normal. Pupils are equal, round, and reactive to light. No scleral icterus.   Neck: Neck supple.   Normal range of motion.  Cardiovascular:  Normal rate, regular rhythm, normal heart sounds and intact distal pulses.     Exam reveals no gallop and no friction rub.       No murmur heard.  Pulmonary/Chest: Breath sounds normal. No stridor. No respiratory distress. He has no wheezes. He has no rhonchi. He has no rales.   Abdominal: Abdomen is soft. Bowel sounds are normal. He exhibits no distension. There is no abdominal tenderness. There is no rebound and no guarding.   Musculoskeletal:         General: No tenderness or edema. Normal range of motion.      Cervical back: Normal range of motion and neck supple.     Neurological: He is alert and oriented to person, place, and time. He has normal strength. No cranial nerve deficit.   Skin: Skin is warm and dry. No rash noted.   Psychiatric: He has a normal mood and affect. His behavior is normal.         ED Course   Procedures  Labs Reviewed   CBC W/ AUTO DIFFERENTIAL - Abnormal; Notable for the following components:       Result Value    RBC 3.70 (*)     Hemoglobin 10.5 (*)     Hematocrit 30.9 (*)     All other components within normal limits   COMPREHENSIVE METABOLIC PANEL - Abnormal; Notable for the following components:    Potassium 3.1 (*)     BUN 73 (*)     Creatinine 6.5 (*)     Calcium 7.6 (*)      eGFR 9 (*)     All other components within normal limits   TROPONIN I - Abnormal; Notable for the following components:    Troponin I 0.052 (*)     All other components within normal limits   B-TYPE NATRIURETIC PEPTIDE - Abnormal; Notable for the following components:     (*)     All other components within normal limits   TROPONIN I - Abnormal; Notable for the following components:    Troponin I 0.075 (*)     All other components within normal limits   URINALYSIS, REFLEX TO URINE CULTURE     EKG Readings: (Independently Interpreted)   Initial Reading: No STEMI. Rhythm: Normal Sinus Rhythm. Heart Rate: 86. Other Findings: Prolonged QT Interval.   No ST segment elevation or depression concerning for acute ischemia.          Imaging Results              X-Ray Chest AP Portable (Final result)  Result time 12/04/23 20:00:44      Final result by Serafin Isabel MD (12/04/23 20:00:44)                   Impression:      No acute abnormality.      Electronically signed by: Serafin Isabel  Date:    12/04/2023  Time:    20:00               Narrative:    EXAMINATION:  XR CHEST AP PORTABLE    CLINICAL HISTORY:  Chest Pain;    TECHNIQUE:  Single frontal view of the chest was performed.    COMPARISON:  02/21/2019    FINDINGS:  Anterior cervical fusion hardware.The lungs are clear, with normal appearance of pulmonary vasculature and no pleural effusion or pneumothorax.    The cardiac silhouette is normal in size. The hilar and mediastinal contours are unremarkable.    Bones are intact.                                       Medications   amLODIPine tablet 10 mg (10 mg Oral Given 12/4/23 2016)   losartan tablet 50 mg (50 mg Oral Given 12/4/23 2016)   ketorolac injection 15 mg (15 mg Intravenous Given 12/4/23 2016)   prochlorperazine injection Soln 10 mg (10 mg Intravenous Given 12/4/23 2139)     Medical Decision Making  Risk  Prescription drug management.                          Medical Decision Making:   Initial  Assessment:   58 year old patient presenting secondary to chest pain and HA. Troponins, Cxr, ekg, and labs ordered which showed slight troponin elevation. Noted kidney failure. Patient received toradol and compazine for HA.    [email or web address]    Also considered but less likely:     PE: normal rate, no sob/recent immovilization/surgery/travel/family history  Pneumonia: chest xray negative. No fever or leukoscytosis. No cough and lungs non consistent with pna  Tamponade: unlikely due to chest xray and ekg  STEMI: No STEMI on ekg  Dissection: equal pulses bilaterally and no ripping chest pain to the back  Esophageal rupture: no dysphagia or vomiting and chest xray negative for mediastinal air  Arrhythmia: no arrhythmia on ekg  CHF: no fluid overload on Cxr and physical exam  Pneumothorax: bilateral breath sounds and no signs of pneumothorax on chest xray     Patient given home dose of antihypertensives with minimal improvement.  Toradol and Compazine ordered before labs returned based on historical values and without evidence or known prior renal insufficiency.  Labs reveal substantial sign of kidney failure which is new.  Discussed with patient and need for admission to hospital for workup evaluation.  Patient is refusing has elected to sign out against medical advice.  I have discussed the potential dangerous outcomes of this which include cardiac arrhythmia, hypoxia, death, loss of function.  I have discussed other indices such as an elevated troponin potential meanings of this.  I have discussed his abnormal EKG.  Despite this, the patient has elected to leave against medical advice.  I have no reason to suspect the patient does not have capability to understand the consequences of his actions. He did not appear intoxicated, deranged, or altered. Patient encouraged to return for any new or worsening condition.                 Clinical Impression:  Final diagnoses:  [R07.9]  Chest pain  [R51.9] Acute nonintractable headache, unspecified headache type (Primary)  [N19] Renal failure, unspecified chronicity          ED Disposition Condition    AMA Stable                Kevin Doe MD  12/05/23 0306       Kevin Doe MD  12/05/23 0306

## 2023-12-05 NOTE — DISCHARGE INSTRUCTIONS
You were seen in the emergency department for chest pain and a headache.  Your labs reveal you are in kidney failure.  This is extremely dangerous and can be fatal.  We have recommended that you stay in the hospital for further evaluation. You have elected to leave against medical advice. Please return for any new or worsening chest pain, nausea, vomiting, difficulty breathing, coughing up blood, profuse sweating, dizziness, lightheadedness, numbness, weakness, or any other new or worsening concerns.

## 2023-12-05 NOTE — ED NOTES
Pt presents with complaints of L sided CP starting today that is intermittent and sharp. States he has been out of his amlodipine and losartan-hctz for approx 7 days. PCP recently retired and has appointment with new PCP on 12/8. BP is extremely elevated. Pt currently in NAD and is in NSR on cardiac monitor. Labs collected and being sent. Call light in patient's hand. Will monitor.

## 2024-01-08 ENCOUNTER — HOSPITAL ENCOUNTER (OUTPATIENT)
Dept: RADIOLOGY | Facility: HOSPITAL | Age: 59
Discharge: HOME | End: 2024-01-08
Attending: NURSE PRACTITIONER
Payer: COMMERCIAL

## 2024-01-08 DIAGNOSIS — U07.1 CLINICAL DIAGNOSIS OF COVID-19: Primary | ICD-10-CM

## 2024-01-08 DIAGNOSIS — U07.1 CLINICAL DIAGNOSIS OF COVID-19: ICD-10-CM

## 2024-01-08 PROCEDURE — 71046 X-RAY EXAM CHEST 2 VIEWS: CPT | Mod: 26,,, | Performed by: RADIOLOGY

## 2024-01-08 PROCEDURE — 71046 X-RAY EXAM CHEST 2 VIEWS: CPT | Mod: TC,FY

## 2024-01-17 ENCOUNTER — HOSPITAL ENCOUNTER (INPATIENT)
Facility: HOSPITAL | Age: 59
LOS: 2 days | Discharge: HOME | DRG: 639 | End: 2024-01-20
Attending: STUDENT IN AN ORGANIZED HEALTH CARE EDUCATION/TRAINING PROGRAM | Admitting: STUDENT IN AN ORGANIZED HEALTH CARE EDUCATION/TRAINING PROGRAM
Payer: COMMERCIAL

## 2024-01-17 DIAGNOSIS — R07.9 CHEST PAIN: ICD-10-CM

## 2024-01-17 DIAGNOSIS — T38.3X1A POISONING BY ORAL SULFONYLUREA DERIVATIVE, ACCIDENTAL OR UNINTENTIONAL, INITIAL ENCOUNTER: Primary | ICD-10-CM

## 2024-01-17 DIAGNOSIS — E16.2 HYPOGLYCEMIA: ICD-10-CM

## 2024-01-17 DIAGNOSIS — I10 ESSENTIAL HYPERTENSION: Chronic | ICD-10-CM

## 2024-01-17 DIAGNOSIS — N17.9 AKI (ACUTE KIDNEY INJURY): ICD-10-CM

## 2024-01-17 PROBLEM — E11.649 CONTROLLED TYPE 2 DIABETES MELLITUS WITH HYPOGLYCEMIA, WITHOUT LONG-TERM CURRENT USE OF INSULIN: Status: ACTIVE | Noted: 2024-01-17

## 2024-01-17 LAB
ALBUMIN SERPL BCP-MCNC: 3.9 G/DL (ref 3.5–5.2)
ALP SERPL-CCNC: 57 U/L (ref 55–135)
ALT SERPL W/O P-5'-P-CCNC: 21 U/L (ref 10–44)
ANION GAP SERPL CALC-SCNC: 10 MMOL/L (ref 8–16)
AST SERPL-CCNC: 32 U/L (ref 10–40)
BASOPHILS # BLD AUTO: 0.02 K/UL (ref 0–0.2)
BASOPHILS NFR BLD: 0.1 % (ref 0–1.9)
BILIRUB SERPL-MCNC: 0.4 MG/DL (ref 0.1–1)
BILIRUB UR QL STRIP: NEGATIVE
BUN SERPL-MCNC: 36 MG/DL (ref 6–20)
CALCIUM SERPL-MCNC: 8.8 MG/DL (ref 8.7–10.5)
CHLORIDE SERPL-SCNC: 100 MMOL/L (ref 95–110)
CLARITY UR: CLEAR
CO2 SERPL-SCNC: 29 MMOL/L (ref 23–29)
COLOR UR: YELLOW
CREAT SERPL-MCNC: 2 MG/DL (ref 0.5–1.4)
DIFFERENTIAL METHOD BLD: ABNORMAL
EOSINOPHIL # BLD AUTO: 0 K/UL (ref 0–0.5)
EOSINOPHIL NFR BLD: 0.3 % (ref 0–8)
ERYTHROCYTE [DISTWIDTH] IN BLOOD BY AUTOMATED COUNT: 14.1 % (ref 11.5–14.5)
EST. GFR  (NO RACE VARIABLE): 38 ML/MIN/1.73 M^2
GLUCOSE SERPL-MCNC: 56 MG/DL (ref 70–110)
GLUCOSE UR QL STRIP: NEGATIVE
HCT VFR BLD AUTO: 32.5 % (ref 40–54)
HGB BLD-MCNC: 11.2 G/DL (ref 14–18)
HGB UR QL STRIP: NEGATIVE
IMM GRANULOCYTES # BLD AUTO: 0.12 K/UL (ref 0–0.04)
IMM GRANULOCYTES NFR BLD AUTO: 0.8 % (ref 0–0.5)
KETONES UR QL STRIP: NEGATIVE
LEUKOCYTE ESTERASE UR QL STRIP: NEGATIVE
LYMPHOCYTES # BLD AUTO: 0.9 K/UL (ref 1–4.8)
LYMPHOCYTES NFR BLD: 6 % (ref 18–48)
MAGNESIUM SERPL-MCNC: 1.2 MG/DL (ref 1.6–2.6)
MCH RBC QN AUTO: 27.9 PG (ref 27–31)
MCHC RBC AUTO-ENTMCNC: 34.5 G/DL (ref 32–36)
MCV RBC AUTO: 81 FL (ref 82–98)
MONOCYTES # BLD AUTO: 0.9 K/UL (ref 0.3–1)
MONOCYTES NFR BLD: 6.1 % (ref 4–15)
NEUTROPHILS # BLD AUTO: 12.7 K/UL (ref 1.8–7.7)
NEUTROPHILS NFR BLD: 86.7 % (ref 38–73)
NITRITE UR QL STRIP: NEGATIVE
NRBC BLD-RTO: 0 /100 WBC
PH UR STRIP: 7 [PH] (ref 5–8)
PLATELET # BLD AUTO: 358 K/UL (ref 150–450)
PMV BLD AUTO: 9.9 FL (ref 9.2–12.9)
POCT GLUCOSE: 100 MG/DL (ref 70–110)
POCT GLUCOSE: 111 MG/DL (ref 70–110)
POCT GLUCOSE: 129 MG/DL (ref 70–110)
POCT GLUCOSE: 28 MG/DL (ref 70–110)
POCT GLUCOSE: 38 MG/DL (ref 70–110)
POCT GLUCOSE: 43 MG/DL (ref 70–110)
POCT GLUCOSE: 46 MG/DL (ref 70–110)
POCT GLUCOSE: 51 MG/DL (ref 70–110)
POCT GLUCOSE: 56 MG/DL (ref 70–110)
POCT GLUCOSE: 59 MG/DL (ref 70–110)
POCT GLUCOSE: 63 MG/DL (ref 70–110)
POCT GLUCOSE: 67 MG/DL (ref 70–110)
POCT GLUCOSE: 67 MG/DL (ref 70–110)
POCT GLUCOSE: 69 MG/DL (ref 70–110)
POCT GLUCOSE: 72 MG/DL (ref 70–110)
POCT GLUCOSE: 73 MG/DL (ref 70–110)
POCT GLUCOSE: 79 MG/DL (ref 70–110)
POCT GLUCOSE: 79 MG/DL (ref 70–110)
POCT GLUCOSE: 81 MG/DL (ref 70–110)
POCT GLUCOSE: 94 MG/DL (ref 70–110)
POTASSIUM SERPL-SCNC: 3.6 MMOL/L (ref 3.5–5.1)
PROT SERPL-MCNC: 7.5 G/DL (ref 6–8.4)
PROT UR QL STRIP: NEGATIVE
RBC # BLD AUTO: 4.02 M/UL (ref 4.6–6.2)
SODIUM SERPL-SCNC: 139 MMOL/L (ref 136–145)
SP GR UR STRIP: 1.01 (ref 1–1.03)
URN SPEC COLLECT METH UR: NORMAL
UROBILINOGEN UR STRIP-ACNC: NEGATIVE EU/DL
WBC # BLD AUTO: 14.68 K/UL (ref 3.9–12.7)

## 2024-01-17 PROCEDURE — 25000003 PHARM REV CODE 250: Performed by: HOSPITALIST

## 2024-01-17 PROCEDURE — 85025 COMPLETE CBC W/AUTO DIFF WBC: CPT | Performed by: STUDENT IN AN ORGANIZED HEALTH CARE EDUCATION/TRAINING PROGRAM

## 2024-01-17 PROCEDURE — 99285 EMERGENCY DEPT VISIT HI MDM: CPT | Mod: 25

## 2024-01-17 PROCEDURE — 96366 THER/PROPH/DIAG IV INF ADDON: CPT

## 2024-01-17 PROCEDURE — 63600175 PHARM REV CODE 636 W HCPCS: Performed by: STUDENT IN AN ORGANIZED HEALTH CARE EDUCATION/TRAINING PROGRAM

## 2024-01-17 PROCEDURE — 81003 URINALYSIS AUTO W/O SCOPE: CPT | Performed by: STUDENT IN AN ORGANIZED HEALTH CARE EDUCATION/TRAINING PROGRAM

## 2024-01-17 PROCEDURE — G0378 HOSPITAL OBSERVATION PER HR: HCPCS

## 2024-01-17 PROCEDURE — 25000003 PHARM REV CODE 250: Performed by: STUDENT IN AN ORGANIZED HEALTH CARE EDUCATION/TRAINING PROGRAM

## 2024-01-17 PROCEDURE — 80053 COMPREHEN METABOLIC PANEL: CPT | Performed by: STUDENT IN AN ORGANIZED HEALTH CARE EDUCATION/TRAINING PROGRAM

## 2024-01-17 PROCEDURE — 96368 THER/DIAG CONCURRENT INF: CPT

## 2024-01-17 PROCEDURE — 93005 ELECTROCARDIOGRAM TRACING: CPT

## 2024-01-17 PROCEDURE — 83036 HEMOGLOBIN GLYCOSYLATED A1C: CPT | Performed by: HOSPITALIST

## 2024-01-17 PROCEDURE — 93010 ELECTROCARDIOGRAM REPORT: CPT | Mod: ,,, | Performed by: INTERNAL MEDICINE

## 2024-01-17 PROCEDURE — 96365 THER/PROPH/DIAG IV INF INIT: CPT | Mod: 59

## 2024-01-17 PROCEDURE — 96375 TX/PRO/DX INJ NEW DRUG ADDON: CPT

## 2024-01-17 PROCEDURE — 83735 ASSAY OF MAGNESIUM: CPT | Performed by: STUDENT IN AN ORGANIZED HEALTH CARE EDUCATION/TRAINING PROGRAM

## 2024-01-17 PROCEDURE — 63600175 PHARM REV CODE 636 W HCPCS: Performed by: HOSPITALIST

## 2024-01-17 RX ORDER — IPRATROPIUM BROMIDE AND ALBUTEROL SULFATE 2.5; .5 MG/3ML; MG/3ML
3 SOLUTION RESPIRATORY (INHALATION) EVERY 4 HOURS PRN
Status: DISCONTINUED | OUTPATIENT
Start: 2024-01-17 | End: 2024-01-20 | Stop reason: HOSPADM

## 2024-01-17 RX ORDER — DEXTROSE MONOHYDRATE 100 MG/ML
INJECTION, SOLUTION INTRAVENOUS
Status: ACTIVE | OUTPATIENT
Start: 2024-01-17 | End: 2024-01-17

## 2024-01-17 RX ORDER — BLOOD-GLUCOSE METER
EACH MISCELLANEOUS
COMMUNITY
Start: 2024-01-12

## 2024-01-17 RX ORDER — ALUMINUM HYDROXIDE, MAGNESIUM HYDROXIDE, AND SIMETHICONE 1200; 120; 1200 MG/30ML; MG/30ML; MG/30ML
30 SUSPENSION ORAL
Status: COMPLETED | OUTPATIENT
Start: 2024-01-17 | End: 2024-01-17

## 2024-01-17 RX ORDER — GLUCAGON 1 MG
1 KIT INJECTION
Status: DISCONTINUED | OUTPATIENT
Start: 2024-01-17 | End: 2024-01-18

## 2024-01-17 RX ORDER — TAMSULOSIN HYDROCHLORIDE 0.4 MG/1
0.4 CAPSULE ORAL DAILY
Status: DISCONTINUED | OUTPATIENT
Start: 2024-01-17 | End: 2024-01-20 | Stop reason: HOSPADM

## 2024-01-17 RX ORDER — PROCHLORPERAZINE EDISYLATE 5 MG/ML
5 INJECTION INTRAMUSCULAR; INTRAVENOUS EVERY 6 HOURS PRN
Status: DISCONTINUED | OUTPATIENT
Start: 2024-01-17 | End: 2024-01-20 | Stop reason: HOSPADM

## 2024-01-17 RX ORDER — LOSARTAN POTASSIUM AND HYDROCHLOROTHIAZIDE 12.5; 5 MG/1; MG/1
1 TABLET ORAL DAILY
Status: DISCONTINUED | OUTPATIENT
Start: 2024-01-17 | End: 2024-01-19

## 2024-01-17 RX ORDER — ALBUTEROL SULFATE 90 UG/1
AEROSOL, METERED RESPIRATORY (INHALATION)
COMMUNITY
Start: 2024-01-08

## 2024-01-17 RX ORDER — TALC
6 POWDER (GRAM) TOPICAL NIGHTLY PRN
Status: DISCONTINUED | OUTPATIENT
Start: 2024-01-17 | End: 2024-01-20 | Stop reason: HOSPADM

## 2024-01-17 RX ORDER — IBUPROFEN 200 MG
16 TABLET ORAL
Status: DISCONTINUED | OUTPATIENT
Start: 2024-01-17 | End: 2024-01-20 | Stop reason: HOSPADM

## 2024-01-17 RX ORDER — AMLODIPINE BESYLATE 5 MG/1
10 TABLET ORAL DAILY
Status: DISCONTINUED | OUTPATIENT
Start: 2024-01-17 | End: 2024-01-20 | Stop reason: HOSPADM

## 2024-01-17 RX ORDER — ACETAMINOPHEN 325 MG/1
650 TABLET ORAL EVERY 6 HOURS PRN
Status: DISCONTINUED | OUTPATIENT
Start: 2024-01-17 | End: 2024-01-20 | Stop reason: HOSPADM

## 2024-01-17 RX ORDER — PROMETHAZINE HYDROCHLORIDE AND DEXTROMETHORPHAN HYDROBROMIDE 6.25; 15 MG/5ML; MG/5ML
5-10 SYRUP ORAL EVERY 4 HOURS PRN
COMMUNITY
Start: 2024-01-08 | End: 2024-05-15

## 2024-01-17 RX ORDER — MAGNESIUM SULFATE HEPTAHYDRATE 40 MG/ML
2 INJECTION, SOLUTION INTRAVENOUS ONCE
Status: COMPLETED | OUTPATIENT
Start: 2024-01-17 | End: 2024-01-17

## 2024-01-17 RX ORDER — POLYETHYLENE GLYCOL 3350 17 G/17G
17 POWDER, FOR SOLUTION ORAL DAILY
Status: DISCONTINUED | OUTPATIENT
Start: 2024-01-17 | End: 2024-01-20 | Stop reason: HOSPADM

## 2024-01-17 RX ORDER — HYDROXYZINE HYDROCHLORIDE 25 MG/1
25 TABLET, FILM COATED ORAL NIGHTLY
COMMUNITY
Start: 2024-01-12 | End: 2024-05-15

## 2024-01-17 RX ORDER — ONDANSETRON HYDROCHLORIDE 2 MG/ML
4 INJECTION, SOLUTION INTRAVENOUS EVERY 8 HOURS PRN
Status: DISCONTINUED | OUTPATIENT
Start: 2024-01-17 | End: 2024-01-20 | Stop reason: HOSPADM

## 2024-01-17 RX ORDER — IBUPROFEN 200 MG
24 TABLET ORAL
Status: DISCONTINUED | OUTPATIENT
Start: 2024-01-17 | End: 2024-01-20 | Stop reason: HOSPADM

## 2024-01-17 RX ORDER — SODIUM CHLORIDE 0.9 % (FLUSH) 0.9 %
10 SYRINGE (ML) INJECTION EVERY 8 HOURS PRN
Status: DISCONTINUED | OUTPATIENT
Start: 2024-01-17 | End: 2024-01-20 | Stop reason: HOSPADM

## 2024-01-17 RX ORDER — SIMETHICONE 80 MG
1 TABLET,CHEWABLE ORAL 4 TIMES DAILY PRN
Status: DISCONTINUED | OUTPATIENT
Start: 2024-01-17 | End: 2024-01-20 | Stop reason: HOSPADM

## 2024-01-17 RX ORDER — IBUPROFEN 200 MG
24 TABLET ORAL
Status: DISCONTINUED | OUTPATIENT
Start: 2024-01-17 | End: 2024-01-18

## 2024-01-17 RX ORDER — DEXTROSE 50 % IN WATER (D50W) INTRAVENOUS SYRINGE
25
Status: COMPLETED | OUTPATIENT
Start: 2024-01-17 | End: 2024-01-17

## 2024-01-17 RX ORDER — IBUPROFEN 200 MG
16 TABLET ORAL
Status: DISCONTINUED | OUTPATIENT
Start: 2024-01-17 | End: 2024-01-18

## 2024-01-17 RX ORDER — FLUTICASONE PROPIONATE 50 MCG
SPRAY, SUSPENSION (ML) NASAL
COMMUNITY
Start: 2024-01-08

## 2024-01-17 RX ORDER — DEXTROSE MONOHYDRATE 100 MG/ML
INJECTION, SOLUTION INTRAVENOUS CONTINUOUS
Status: DISCONTINUED | OUTPATIENT
Start: 2024-01-17 | End: 2024-01-19

## 2024-01-17 RX ORDER — LANCETS 30 GAUGE
EACH MISCELLANEOUS
COMMUNITY
Start: 2024-01-12

## 2024-01-17 RX ORDER — TAMSULOSIN HYDROCHLORIDE 0.4 MG/1
0.4 CAPSULE ORAL DAILY
COMMUNITY
Start: 2024-01-12

## 2024-01-17 RX ORDER — GLIPIZIDE 10 MG/1
10 TABLET, FILM COATED, EXTENDED RELEASE ORAL EVERY MORNING
Status: ON HOLD | COMMUNITY
Start: 2024-01-12 | End: 2024-01-20 | Stop reason: HOSPADM

## 2024-01-17 RX ORDER — ALUMINUM HYDROXIDE, MAGNESIUM HYDROXIDE, AND SIMETHICONE 1200; 120; 1200 MG/30ML; MG/30ML; MG/30ML
30 SUSPENSION ORAL 4 TIMES DAILY PRN
Status: DISCONTINUED | OUTPATIENT
Start: 2024-01-17 | End: 2024-01-20 | Stop reason: HOSPADM

## 2024-01-17 RX ORDER — CALCIUM CITRATE/VITAMIN D3 200MG-6.25
TABLET ORAL
COMMUNITY
Start: 2024-01-12

## 2024-01-17 RX ORDER — NALOXONE HCL 0.4 MG/ML
0.02 VIAL (ML) INJECTION
Status: DISCONTINUED | OUTPATIENT
Start: 2024-01-17 | End: 2024-01-20 | Stop reason: HOSPADM

## 2024-01-17 RX ADMIN — Medication 16 G: at 09:01

## 2024-01-17 RX ADMIN — ACETAMINOPHEN 650 MG: 325 TABLET ORAL at 08:01

## 2024-01-17 RX ADMIN — DEXTROSE MONOHYDRATE 25 G: 25 INJECTION, SOLUTION INTRAVENOUS at 11:01

## 2024-01-17 RX ADMIN — DEXTROSE MONOHYDRATE: 100 INJECTION, SOLUTION INTRAVENOUS at 01:01

## 2024-01-17 RX ADMIN — ALUMINUM HYDROXIDE, MAGNESIUM HYDROXIDE, AND DIMETHICONE 30 ML: 200; 20; 200 SUSPENSION ORAL at 12:01

## 2024-01-17 RX ADMIN — DEXTROSE MONOHYDRATE 250 ML: 100 INJECTION, SOLUTION INTRAVENOUS at 01:01

## 2024-01-17 RX ADMIN — Medication 16 G: at 06:01

## 2024-01-17 RX ADMIN — Medication 16 G: at 05:01

## 2024-01-17 RX ADMIN — ONDANSETRON 4 MG: 2 INJECTION INTRAMUSCULAR; INTRAVENOUS at 11:01

## 2024-01-17 RX ADMIN — Medication 16 G: at 10:01

## 2024-01-17 RX ADMIN — DEXTROSE MONOHYDRATE: 100 INJECTION, SOLUTION INTRAVENOUS at 04:01

## 2024-01-17 RX ADMIN — POLYETHYLENE GLYCOL 3350 17 G: 17 POWDER, FOR SOLUTION ORAL at 02:01

## 2024-01-17 RX ADMIN — DEXTROSE MONOHYDRATE 125 ML: 100 INJECTION, SOLUTION INTRAVENOUS at 11:01

## 2024-01-17 RX ADMIN — DEXTROSE MONOHYDRATE: 100 INJECTION, SOLUTION INTRAVENOUS at 08:01

## 2024-01-17 RX ADMIN — Medication 16 G: at 12:01

## 2024-01-17 RX ADMIN — DEXTROSE MONOHYDRATE: 100 INJECTION, SOLUTION INTRAVENOUS at 11:01

## 2024-01-17 RX ADMIN — DEXTROSE MONOHYDRATE 250 ML: 100 INJECTION, SOLUTION INTRAVENOUS at 12:01

## 2024-01-17 RX ADMIN — MAGNESIUM SULFATE HEPTAHYDRATE 2 G: 2 INJECTION, SOLUTION INTRAVENOUS at 01:01

## 2024-01-17 NOTE — ED PROVIDER NOTES
"Encounter Date: 1/17/2024    SCRIBE #1 NOTE: I, Ildefonsojosiane Doherty, am scribing for, and in the presence of,  Ehsan Feliz MD.       History     Chief Complaint   Patient presents with    Hypoglycemia     Pt to ED via EMS with complaints of hypoglycemia. Recently diagnosed with DM approx 1 week ago- started taking glipizide ER 10 mg 1 a day. Last night EMS called out due to low CBG- CBG 18. This AM called out again due to CBG 40. Pt AAOx4 currently. CBG in triage 87. Currently infusing D10, received approx 30g.      58 y.o. male with PMHx of HTN, DM, presents to the ED for evaluation of AMS and hypoglycemia. He reports recently diagnosed with DM 1 week ago. Started on glipizide 10mg QD, last taken this morning. He reports checking his blood glucose BID with the highest being 171 and lowest being 24 (which was yesterday). He is currently asymptomatic. Reports using the bathroom last night, stating that he "couldn't see" or "know where he was at." He notes having to get assistance from his fiancee. No other medications taken PTA. No alleviating or exacerbating factors noted. Denies CP, SOB, abdominal pain, nausea, vomiting, diarrhea, dysuria, hematuria, constipation, fever, chills or any associated symptoms. Denies a social hx of EtOH. Endorses recently smoking with a 4 month cessation prior to restarting.     Per significant other:  Yesterday the patient's glucose was also dropping into the 20s and 30s.  She states that this morning he was confused, so she went and got him some juice.    The history is provided by the patient and a significant other. No  was used.     Review of patient's allergies indicates:  No Known Allergies  Past Medical History:   Diagnosis Date    Diabetes mellitus     HTN (hypertension) 05/09/2013 2005    LBP radiating to right leg 05/09/2013    Smokes < 1 pack of cigarettes per day 05/09/2013    1/2 ppd     Past Surgical History:   Procedure Laterality Date    BACK SURGERY "      c-spine fussion  2008     Family History   Problem Relation Age of Onset    Hypertension Mother      Social History     Tobacco Use    Smoking status: Every Day     Current packs/day: 1.00     Types: Cigarettes    Smokeless tobacco: Never   Substance Use Topics    Alcohol use: No    Drug use: Yes     Types: Marijuana     Review of Systems    Physical Exam     Initial Vitals [01/17/24 1040]   BP Pulse Resp Temp SpO2   (!) 177/102 99 18 97.5 °F (36.4 °C) 100 %      MAP       --         Physical Exam    Nursing note and vitals reviewed.  Constitutional: He appears well-developed and well-nourished. He is not diaphoretic. No distress.   HENT:   Head: Normocephalic and atraumatic.   Eyes: Conjunctivae and EOM are normal. Pupils are equal, round, and reactive to light.   Neck: Neck supple.   Normal range of motion.  Cardiovascular:  Normal rate, regular rhythm, normal heart sounds and intact distal pulses.           No murmur heard.  Pulmonary/Chest: Breath sounds normal. No respiratory distress. He has no wheezes. He has no rhonchi. He has no rales.   Abdominal: Abdomen is soft. He exhibits no distension. There is no abdominal tenderness. There is no rebound and no guarding.   Musculoskeletal:         General: No tenderness or edema.      Cervical back: Normal range of motion and neck supple.     Neurological: He is alert and oriented to person, place, and time. He has normal strength. No sensory deficit. GCS score is 15. GCS eye subscore is 4. GCS verbal subscore is 5. GCS motor subscore is 6.   Skin: Skin is warm and dry. Capillary refill takes less than 2 seconds.         ED Course   Procedures  Labs Reviewed   CBC W/ AUTO DIFFERENTIAL - Abnormal; Notable for the following components:       Result Value    WBC 14.68 (*)     RBC 4.02 (*)     Hemoglobin 11.2 (*)     Hematocrit 32.5 (*)     MCV 81 (*)     Immature Granulocytes 0.8 (*)     Gran # (ANC) 12.7 (*)     Immature Grans (Abs) 0.12 (*)     Lymph # 0.9 (*)      Gran % 86.7 (*)     Lymph % 6.0 (*)     All other components within normal limits   COMPREHENSIVE METABOLIC PANEL - Abnormal; Notable for the following components:    Glucose 56 (*)     BUN 36 (*)     Creatinine 2.0 (*)     eGFR 38 (*)     All other components within normal limits   MAGNESIUM - Abnormal; Notable for the following components:    Magnesium 1.2 (*)     All other components within normal limits   POCT GLUCOSE - Abnormal; Notable for the following components:    POCT Glucose 38 (*)     All other components within normal limits   POCT GLUCOSE - Abnormal; Notable for the following components:    POCT Glucose 69 (*)     All other components within normal limits   POCT GLUCOSE - Abnormal; Notable for the following components:    POCT Glucose 111 (*)     All other components within normal limits   POCT GLUCOSE - Abnormal; Notable for the following components:    POCT Glucose 59 (*)     All other components within normal limits   POCT GLUCOSE - Abnormal; Notable for the following components:    POCT Glucose 129 (*)     All other components within normal limits   URINALYSIS, REFLEX TO URINE CULTURE    Narrative:     Specimen Source->Urine   HEMOGLOBIN A1C   POCT GLUCOSE, HAND-HELD DEVICE   POCT GLUCOSE, HAND-HELD DEVICE   POCT GLUCOSE   POCT GLUCOSE   POCT GLUCOSE MONITORING CONTINUOUS   POCT GLUCOSE MONITORING CONTINUOUS   POCT GLUCOSE MONITORING CONTINUOUS   POCT GLUCOSE MONITORING CONTINUOUS          Imaging Results              X-Ray Chest AP Portable (Final result)  Result time 01/17/24 12:26:55      Final result by Jeromy Coombs MD (01/17/24 12:26:55)                   Impression:      See above      Electronically signed by: Jeromy Coombs MD  Date:    01/17/2024  Time:    12:26               Narrative:    EXAMINATION:  XR CHEST AP PORTABLE    CLINICAL HISTORY:  hypoglycemia & leukocytosis, no sob;    TECHNIQUE:  Single frontal view of the chest was  performed.    COMPARISON:  01/08/2024    FINDINGS:  Cardiac size is normal.  Lungs are clear and well aerated with no infiltrate.  Gastric air bubble is distended.                                       Medications   dextrose 10 % infusion ( Intravenous Not Given 1/17/24 1115)   dextrose 10 % infusion ( Intravenous New Bag 1/17/24 1314)   glucose chewable tablet 16 g (16 g Oral Given 1/17/24 1203)   glucose chewable tablet 24 g (has no administration in time range)   glucagon (human recombinant) injection 1 mg (has no administration in time range)   dextrose 10% bolus 125 mL 125 mL (has no administration in time range)   dextrose 10% bolus 250 mL 250 mL (0 mLs Intravenous Stopped 1/17/24 1405)   magnesium sulfate 2g in water 50mL IVPB (premix) (2 g Intravenous New Bag 1/17/24 1317)   amLODIPine tablet 10 mg (has no administration in time range)   losartan-hydrochlorothiazide 50-12.5 mg per tablet 1 tablet (has no administration in time range)   tamsulosin 24 hr capsule 0.4 mg (has no administration in time range)   sodium chloride 0.9% flush 10 mL (has no administration in time range)   albuterol-ipratropium 2.5 mg-0.5 mg/3 mL nebulizer solution 3 mL (has no administration in time range)   melatonin tablet 6 mg (has no administration in time range)   ondansetron injection 4 mg (has no administration in time range)   prochlorperazine injection Soln 5 mg (has no administration in time range)   polyethylene glycol packet 17 g (has no administration in time range)   acetaminophen tablet 650 mg (has no administration in time range)   simethicone chewable tablet 80 mg (has no administration in time range)   aluminum-magnesium hydroxide-simethicone 200-200-20 mg/5 mL suspension 30 mL (has no administration in time range)   naloxone 0.4 mg/mL injection 0.02 mg (has no administration in time range)   glucose chewable tablet 16 g (has no administration in time range)   glucose chewable tablet 24 g (has no administration in  time range)   glucagon (human recombinant) injection 1 mg (has no administration in time range)   dextrose 10% bolus 125 mL 125 mL (has no administration in time range)   dextrose 10% bolus 250 mL 250 mL (has no administration in time range)   dextrose 50 % in water (D50W) injection 25 g (25 g Intravenous Given 1/17/24 1127)   dextrose 10% bolus 250 mL 250 mL (0 mLs Intravenous Stopped 1/17/24 1308)   aluminum-magnesium hydroxide-simethicone 200-200-20 mg/5 mL suspension 30 mL (30 mLs Oral Given 1/17/24 1250)     Medical Decision Making  Differential diagnosis considered includes sulfonylurea toxicity,    Amount and/or Complexity of Data Reviewed  Labs: ordered. Decision-making details documented in ED Course.  Radiology: ordered and independent interpretation performed. Decision-making details documented in ED Course.  ECG/medicine tests: ordered and independent interpretation performed. Decision-making details documented in ED Course.    Risk  OTC drugs.  Prescription drug management.  Decision regarding hospitalization.  Diagnosis or treatment significantly limited by social determinants of health.            Scribe Attestation:   Scribe #1: I performed the above scribed service and the documentation accurately describes the services I performed. I attest to the accuracy of the note.        ED Course as of 01/17/24 1439   Wed Jan 17, 2024   1115 POCT Glucose: 81 [BD]   1130 POCT Glucose(!!): 38  Patient's glucose dropped to 38 despite being on a D10 drip.  We will give an amp of D50 and increase the D10 to 150 ml/hr [BD]   1210 Patient's glucose dropped again into the 60s.  We will give a bolus of D10 250 mL and then increase his D10 drip to 200mL/hr [BD]   1212 EKG 12-lead  EKG independently interpreted by me shows sinus rhythm at a rate of 100, no STEMI, occasional PVCs, appears baseline when compared to 12/04/2023 [BD]   1222 Magnesium (!): 1.2  Will give magnesium 2 g [BD]   1222 CBC auto  differential(!)  Slight leukocytosis, though low suspicion for infection and no focal infection currently identified [BD]   1222 Comprehensive metabolic panel(!)  Suspect baseline creatinine and baseline CKD.  No significant electrolyte derangement.  Hypoglycemia again noted [BD]   1223 Urinalysis, Reflex to Urine Culture Urine, Clean Catch  UA unremarkable without evidence of infection or hematuria   [BD]   1241 POCT Glucose(!): 111  Improved glucose [BD]   1241 X-Ray Chest AP Portable  Chest x-ray independently interpreted by me shows no acute process such as pneumonia, pneumothorax, or pulmonary edema.    [BD]   1413 POCT Glucose(!): 59  Patient's glucose dropped a 59.  We will give another bolus of D10 and then increase his drip to 250 mL/hour [BD]   1414 Discussed with Hospital Medicine who will place the patient under observation in the ICU for sulfonylurea toxicity with persistent hypoglycemia.      Procedure: Critical Care  Please put in 105 minutes of critical care due to patient having a high risk of neurological failure.        [BD]      ED Course User Index  [BD] Ehsan Feliz MD                             I, Ehsan Feliz MD, personally performed the services described in this documentation. All medical record entries made by the scribe were at my direction and in my presence. I have reviewed the chart and agree that the record reflects my personal performance and is accurate and complete.              Clinical Impression:  Final diagnoses:  [E16.2] Hypoglycemia  [T38.3X1A] Poisoning by oral sulfonylurea derivative, accidental or unintentional, initial encounter (Primary)          ED Disposition Condition    Observation                 Ehsan Feliz MD  01/17/24 1440

## 2024-01-17 NOTE — ADMISSIONCARE
AdmissionCare    Guideline: Diabetes - OBS, Observation    Based on the indications selected for the patient, the bed status of Admit to Observation was determined to be MET    The following indications were selected as present at the time of evaluation of the patient:      Hypoglycemia (eg, plasma glucose less than 70 mg/dL (3.89 mmol/L)) and 1 or more of the following:   -     - Initial emergency department treatment (ie, 3 to 4 hours) has not resulted in recovery.    - Recurrence possible due to mechanism of hypoglycemia (eg, use of long-acting oral hypoglycemics or long-acting insulin, ongoing vomiting or diarrhea)    AdmissionCare documentation entered by: Cinda Marcus    [facility], 27th edition, Copyright © 2023 [facility] All Rights Reserved.  [id number]

## 2024-01-17 NOTE — ADMISSIONCARE
AdmissionCare    Guideline: Diabetes - OBS, Observation    Based on the indications selected for the patient, the bed status of Admit to Observation was determined to be MET    The following indications were selected as present at the time of evaluation of the patient:      Hypoglycemia (eg, plasma glucose less than 70 mg/dL (3.89 mmol/L)) and 1 or more of the following:   -     - Initial emergency department treatment (ie, 3 to 4 hours) has not resulted in recovery.    AdmissionCare documentation entered by: Cinda Marcus    [facility], 27th edition, Copyright © 2023 [facility], [facility] All Rights Reserved.  [id number]

## 2024-01-17 NOTE — ASSESSMENT & PLAN NOTE
Chronic, controlled. Latest blood pressure and vitals reviewed-     Temp:  [97.5 °F (36.4 °C)-98.3 °F (36.8 °C)]   Pulse:  [88-99]   Resp:  [16-19]   BP: (137-177)/()   SpO2:  [100 %] .   Home meds for hypertension were reviewed and noted below.   Hypertension Medications               amLODIPine (NORVASC) 10 MG tablet Take 1 tablet (10 mg total) by mouth once daily.    losartan-hydrochlorothiazide 50-12.5 mg (HYZAAR) 50-12.5 mg per tablet TAKE 1 TABLET BY MOUTH ONCE DAILY            While in the hospital, will manage blood pressure as follows; Continue home antihypertensive regimen    Will utilize p.r.n. blood pressure medication only if patient's blood pressure greater than 180/110 and he develops symptoms such as worsening chest pain or shortness of breath.

## 2024-01-17 NOTE — ED NOTES
Report given to Love RODRIGUEZ in the ICU. Pt will be transported per stretcher on monitor. SR up x2 and on Cards monitor.

## 2024-01-17 NOTE — SUBJECTIVE & OBJECTIVE
Past Medical History:   Diagnosis Date    Diabetes mellitus     HTN (hypertension) 2013    LBP radiating to right leg 2013    Smokes < 1 pack of cigarettes per day 2013    1/2 ppd       Past Surgical History:   Procedure Laterality Date    BACK SURGERY      c-spine fussion         Review of patient's allergies indicates:  No Known Allergies    No current facility-administered medications on file prior to encounter.     Current Outpatient Medications on File Prior to Encounter   Medication Sig    albuterol (PROVENTIL/VENTOLIN HFA) 90 mcg/actuation inhaler SMARTSI-2 Puff(s) By Mouth 3 Times Daily PRN    amLODIPine (NORVASC) 10 MG tablet Take 1 tablet (10 mg total) by mouth once daily.    fluticasone propionate (FLONASE) 50 mcg/actuation nasal spray SMARTSI-2 Spray(s) Both Nares Daily    glipiZIDE (GLUCOTROL) 10 MG TR24 Take 10 mg by mouth every morning.    hydrOXYzine HCL (ATARAX) 25 MG tablet Take 25 mg by mouth every evening.    losartan-hydrochlorothiazide 50-12.5 mg (HYZAAR) 50-12.5 mg per tablet TAKE 1 TABLET BY MOUTH ONCE DAILY    promethazine-dextromethorphan (PROMETHAZINE-DM) 6.25-15 mg/5 mL Syrp Take 5-10 mLs by mouth every 4 (four) hours as needed.    tamsulosin (FLOMAX) 0.4 mg Cap Take 0.4 mg by mouth once daily.    TRUE METRIX GLUCOSE METER Misc USE TO CHECK BLOOD SUGAR DAILY AT HOME    TRUE METRIX GLUCOSE TEST STRIP Strp SMARTSIG:Via Meter Daily    ULTRA THIN LANCETS 30 gauge Misc USE TO CHECK BLOOD GLUCOSE EVERY DAY    [DISCONTINUED] gabapentin (NEURONTIN) 100 MG capsule Take 1 capsule (100 mg total) by mouth 3 (three) times daily. Take one tab at bedtime x 3 days. Then take one tab Q12 x 3 days. Then take one tab Q8    [DISCONTINUED] lidocaine (LIDODERM) 5 % Place 1 patch onto the skin once daily. Remove & Discard patch within 12 hours or as directed by MD. May use 4% formulation if more affordable for patient.    [DISCONTINUED] meloxicam (MOBIC) 7.5 MG tablet Take 1  tablet (7.5 mg total) by mouth once daily.     Family History       Problem Relation (Age of Onset)    Hypertension Mother          Tobacco Use    Smoking status: Every Day     Current packs/day: 1.00     Types: Cigarettes    Smokeless tobacco: Never   Substance and Sexual Activity    Alcohol use: No    Drug use: Yes     Types: Marijuana    Sexual activity: Yes     Partners: Female     Review of Systems   Constitutional:  Positive for diaphoresis and fatigue. Negative for chills and fever.   HENT:  Negative for congestion and rhinorrhea.    Eyes:  Negative for photophobia and visual disturbance.   Respiratory:  Negative for cough and shortness of breath.    Cardiovascular:  Negative for chest pain, palpitations and leg swelling.   Gastrointestinal:  Negative for abdominal pain, diarrhea, nausea and vomiting.   Genitourinary:  Negative for frequency, hematuria and urgency.   Skin:  Negative for pallor, rash and wound.   Neurological:  Positive for light-headedness. Negative for headaches.   Psychiatric/Behavioral:  Positive for confusion.      Objective:     Vital Signs (Most Recent):  Temp: 98.3 °F (36.8 °C) (01/17/24 1520)  Pulse: 91 (01/17/24 1520)  Resp: 16 (01/17/24 1520)  BP: 138/67 (01/17/24 1520)  SpO2: 100 % (01/17/24 1520) Vital Signs (24h Range):  Temp:  [97.5 °F (36.4 °C)-98.3 °F (36.8 °C)] 98.3 °F (36.8 °C)  Pulse:  [88-99] 91  Resp:  [16-19] 16  SpO2:  [100 %] 100 %  BP: (137-177)/() 138/67     Weight: 93.5 kg (206 lb 2.1 oz)  Body mass index is 27.2 kg/m².     Physical Exam  Vitals and nursing note reviewed.   Constitutional:       General: He is not in acute distress.     Appearance: He is well-developed.   HENT:      Head: Normocephalic and atraumatic.      Right Ear: External ear normal.      Left Ear: External ear normal.      Nose: Nose normal.   Eyes:      Conjunctiva/sclera: Conjunctivae normal.   Cardiovascular:      Rate and Rhythm: Normal rate and regular rhythm.   Pulmonary:       Effort: Pulmonary effort is normal. No respiratory distress.   Abdominal:      General: There is no distension.      Palpations: Abdomen is soft.   Musculoskeletal:         General: Normal range of motion.   Skin:     General: Skin is warm and dry.   Neurological:      Mental Status: He is alert and oriented to person, place, and time.   Psychiatric:         Thought Content: Thought content normal.                Significant Labs: All pertinent labs within the past 24 hours have been reviewed.    Significant Imaging: I have reviewed all pertinent imaging results/findings within the past 24 hours.

## 2024-01-17 NOTE — ED TRIAGE NOTES
"Pt reports to the ED BIB  EMS with C/O hypoglycemia that started this AM at 0400 and has continues throughout the morning. Per EMS "we were called out around 4 AM this morning because his CBG was 18. Pt was able to get his sugar up at home and refused transport. The pt took went back to bed and when he woke back up it was low again." Pts sugar was in the 20's prior to EMS arrival and eating it came up to 40 when we arrived." Pt CBG was 89 on arrival to the ER. Upon recheck the pt's sugar has dropped to 38. MD Feliz notified. Pt AAOx4, Pt diaphoretic. Pt denies any syncope or LOC. RESP easy and unlabored, AAOx4. ED work up in progress, SR up x2, bed in low locked position, monitors on in place, call bell in reach. NAD noted. Assessment to follow in narrator.   "

## 2024-01-17 NOTE — NURSING
Cbg @ 1710 was 51. Gave 1 glucose tab per MAR and gave 1 cup of OJ as well. Pt reports Nausea. Recheck was cbg of 67. Gave another glucose tab per MAR. MD contacted for further orders.

## 2024-01-17 NOTE — H&P
[facility] Medicine  History & Physical    Patient Name: Ramiro Ca  MRN: [id number]  Patient Class: OP- Observation  Admission Date: 1/17/2024  Attending Physician: Jackson Thurman III, MD   Primary Care Provider: [facility]         Patient information was obtained from patient, past medical records, and ER records.     Subjective:     Principal Problem:Controlled type 2 diabetes mellitus with hypoglycemia, without long-term current use of insulin    Chief Complaint:   Chief Complaint   Patient presents with    Hypoglycemia     Pt to ED via EMS with complaints of hypoglycemia. Recently diagnosed with DM approx 1 week ago- started taking glipizide ER 10 mg 1 a day. Last night EMS called out due to low CBG- CBG 18. This AM called out again due to CBG 40. Pt AAOx4 currently. CBG in triage 87. Currently infusing D10, received approx 30g.         HPI: 58 y.o. male with hypertension and DM2 presents with a complaint of hypoglycemia.  Was recently diagnosed with diabetes and prescribed glipizide 10 mg which he has been taking with breakfast each morning.  Reportedly began to feel clammy, lightheaded, and confused.  Home glucose meter with readings in the 20s and 30s in association with the symptoms.  He would eat candy and drink juice with improvement of symptoms, but they would recur.  Denies fever, chills, cough, SOB, chest pain, palpitations, syncope, nausea, vomiting, diarrhea.  In the ED, he was found to be hypoglycemic.  Labs also show mild leukocytosis with a normal UA, clear chest x-ray and benign abdominal exam.  Impaired renal function, creatinine/BUN 2.0/36 with unclear baseline.  He was given food, juice, and IV dextrose with temporary improvement of sugar but continued to drop.  Placed on a D10 infusion at a high rate of 200 mL/hour.  Persistent hypoglycemia despite these interventions.  Placed in observation to the ICU for close glucose monitoring.    Past  Medical History:   Diagnosis Date    Diabetes mellitus     HTN (hypertension) 2013    LBP radiating to right leg 2013    Smokes < 1 pack of cigarettes per day 2013    1/2 ppd       Past Surgical History:   Procedure Laterality Date    BACK SURGERY      c-spine fussion         Review of patient's allergies indicates:  No Known Allergies    No current facility-administered medications on file prior to encounter.     Current Outpatient Medications on File Prior to Encounter   Medication Sig    albuterol (PROVENTIL/VENTOLIN HFA) 90 mcg/actuation inhaler SMARTSI-2 Puff(s) By Mouth 3 Times Daily PRN    amLODIPine (NORVASC) 10 MG tablet Take 1 tablet (10 mg total) by mouth once daily.    fluticasone propionate (FLONASE) 50 mcg/actuation nasal spray SMARTSI-2 Spray(s) Both Nares Daily    glipiZIDE (GLUCOTROL) 10 MG TR24 Take 10 mg by mouth every morning.    hydrOXYzine HCL (ATARAX) 25 MG tablet Take 25 mg by mouth every evening.    losartan-hydrochlorothiazide 50-12.5 mg (HYZAAR) 50-12.5 mg per tablet TAKE 1 TABLET BY MOUTH ONCE DAILY    promethazine-dextromethorphan (PROMETHAZINE-DM) 6.25-15 mg/5 mL Syrp Take 5-10 mLs by mouth every 4 (four) hours as needed.    tamsulosin (FLOMAX) 0.4 mg Cap Take 0.4 mg by mouth once daily.    TRUE METRIX GLUCOSE METER Misc USE TO CHECK BLOOD SUGAR DAILY AT HOME    TRUE METRIX GLUCOSE TEST STRIP Strp SMARTSIG:Via Meter Daily    ULTRA THIN LANCETS 30 gauge Misc USE TO CHECK BLOOD GLUCOSE EVERY DAY    [DISCONTINUED] gabapentin (NEURONTIN) 100 MG capsule Take 1 capsule (100 mg total) by mouth 3 (three) times daily. Take one tab at bedtime x 3 days. Then take one tab Q12 x 3 days. Then take one tab Q8    [DISCONTINUED] lidocaine (LIDODERM) 5 % Place 1 patch onto the skin once daily. Remove & Discard patch within 12 hours or as directed by MD. May use 4% formulation if more affordable for patient.    [DISCONTINUED] meloxicam (MOBIC) 7.5 MG tablet Take 1  tablet (7.5 mg total) by mouth once daily.     Family History       Problem Relation (Age of Onset)    Hypertension Mother          Tobacco Use    Smoking status: Every Day     Current packs/day: 1.00     Types: Cigarettes    Smokeless tobacco: Never   Substance and Sexual Activity    Alcohol use: No    Drug use: Yes     Types: Marijuana    Sexual activity: Yes     Partners: Female     Review of Systems   Constitutional:  Positive for diaphoresis and fatigue. Negative for chills and fever.   HENT:  Negative for congestion and rhinorrhea.    Eyes:  Negative for photophobia and visual disturbance.   Respiratory:  Negative for cough and shortness of breath.    Cardiovascular:  Negative for chest pain, palpitations and leg swelling.   Gastrointestinal:  Negative for abdominal pain, diarrhea, nausea and vomiting.   Genitourinary:  Negative for frequency, hematuria and urgency.   Skin:  Negative for pallor, rash and wound.   Neurological:  Positive for light-headedness. Negative for headaches.   Psychiatric/Behavioral:  Positive for confusion.      Objective:     Vital Signs (Most Recent):  Temp: 98.3 °F (36.8 °C) (01/17/24 1520)  Pulse: 91 (01/17/24 1520)  Resp: 16 (01/17/24 1520)  BP: 138/67 (01/17/24 1520)  SpO2: 100 % (01/17/24 1520) Vital Signs (24h Range):  Temp:  [97.5 °F (36.4 °C)-98.3 °F (36.8 °C)] 98.3 °F (36.8 °C)  Pulse:  [88-99] 91  Resp:  [16-19] 16  SpO2:  [100 %] 100 %  BP: (137-177)/() 138/67     Weight: 93.5 kg (206 lb 2.1 oz)  Body mass index is 27.2 kg/m².     Physical Exam  Vitals and nursing note reviewed.   Constitutional:       General: He is not in acute distress.     Appearance: He is well-developed.   HENT:      Head: Normocephalic and atraumatic.      Right Ear: External ear normal.      Left Ear: External ear normal.      Nose: Nose normal.   Eyes:      Conjunctiva/sclera: Conjunctivae normal.   Cardiovascular:      Rate and Rhythm: Normal rate and regular rhythm.   Pulmonary:       Effort: Pulmonary effort is normal. No respiratory distress.   Abdominal:      General: There is no distension.      Palpations: Abdomen is soft.   Musculoskeletal:         General: Normal range of motion.   Skin:     General: Skin is warm and dry.   Neurological:      Mental Status: He is alert and oriented to person, place, and time.   Psychiatric:         Thought Content: Thought content normal.                Significant Labs: All pertinent labs within the past 24 hours have been reviewed.    Significant Imaging: I have reviewed all pertinent imaging results/findings within the past 24 hours.  Assessment/Plan:     * Controlled type 2 diabetes mellitus with hypoglycemia, without long-term current use of insulin  Recent diagnosis, started on glipizide, now with persistent hypoglycemia despite treatment.  Continue continuous D10 infusion and Q 1 hour Accu-Cheks.  Encourage p.o. intake.  P.r.n. additional IV dextrose available.    Essential hypertension  Chronic, controlled. Latest blood pressure and vitals reviewed-     Temp:  [97.5 °F (36.4 °C)-98.3 °F (36.8 °C)]   Pulse:  [88-99]   Resp:  [16-19]   BP: (137-177)/()   SpO2:  [100 %] .   Home meds for hypertension were reviewed and noted below.   Hypertension Medications               amLODIPine (NORVASC) 10 MG tablet Take 1 tablet (10 mg total) by mouth once daily.    losartan-hydrochlorothiazide 50-12.5 mg (HYZAAR) 50-12.5 mg per tablet TAKE 1 TABLET BY MOUTH ONCE DAILY            While in the hospital, will manage blood pressure as follows; Continue home antihypertensive regimen    Will utilize p.r.n. blood pressure medication only if patient's blood pressure greater than 180/110 and he develops symptoms such as worsening chest pain or shortness of breath.      VTE Risk Mitigation (From admission, onward)           Ordered     IP VTE LOW RISK PATIENT  Once         01/17/24 1352     Place sequential compression device  Until discontinued         01/17/24  1352                  Critical care time spent on the evaluation and treatment of severe organ dysfunction, review of pertinent labs and imaging studies, discussions with consulting providers and discussions with patient/family:  35 minutes.       On 01/17/2024, patient should be placed in hospital observation services under my care in collaboration with Jackson Thurman MD.      AdmissionCare    Guideline: Diabetes - OBS, Observation    Based on the indications selected for the patient, the bed status of Admit to Observation was determined to be MET    The following indications were selected as present at the time of evaluation of the patient:      Hypoglycemia (eg, plasma glucose less than 70 mg/dL (3.89 mmol/L)) and 1 or more of the following:   -     - Initial emergency department treatment (ie, 3 to 4 hours) has not resulted in recovery.    - Recurrence possible due to mechanism of hypoglycemia (eg, use of long-acting oral hypoglycemics or long-acting insulin, ongoing vomiting or diarrhea)    AdmissionCare documentation entered by: Cinda Marcus    [facility] [employer], 27th edition, Copyright © 2023 [facility] [employer] [facility] All Rights Reserved.  [id number]     Sumit Beckman Jr., APRN, AGACNP-BC  Hospitalist - Department of Hospital Medicine  [facility] [address] [place] [address]  Office #: [phone]; Pager #: [phone]

## 2024-01-17 NOTE — HPI
58 y.o. male with hypertension and DM2 presents with a complaint of hypoglycemia.  Was recently diagnosed with diabetes and prescribed glipizide 10 mg which he has been taking with breakfast each morning.  Reportedly began to feel clammy, lightheaded, and confused.  Home glucose meter with readings in the 20s and 30s in association with the symptoms.  He would eat candy and drink juice with improvement of symptoms, but they would recur.  Denies fever, chills, cough, SOB, chest pain, palpitations, syncope, nausea, vomiting, diarrhea.  In the ED, he was found to be hypoglycemic.  Labs also show mild leukocytosis with a normal UA, clear chest x-ray and benign abdominal exam.  Impaired renal function, creatinine/BUN 2.0/36 with unclear baseline.  He was given food, juice, and IV dextrose with temporary improvement of sugar but continued to drop.  Placed on a D10 infusion at a high rate of 200 mL/hour.  Persistent hypoglycemia despite these interventions.  Placed in observation to the ICU for close glucose monitoring.

## 2024-01-17 NOTE — NURSING
Pt on D10W gtt w. Q1H accu cheks. 1600 cbg 63. Pt asymptomatic. VSS. Pt has no complaints at this time. Gave 125ml/bolus from bag. Gave pt 1 OJ orally. Restarted D10W @ 200ml/hr.

## 2024-01-17 NOTE — ASSESSMENT & PLAN NOTE
Recent diagnosis, started on glipizide, now with persistent hypoglycemia despite treatment.  Continue continuous D10 infusion and Q 1 hour Accu-Cheks.  Encourage p.o. intake.  P.r.n. additional IV dextrose available.

## 2024-01-17 NOTE — NURSING
[facility]  Nurses Note -- 4 Eyes      1/17/2024       Skin assessed on: Q Shift      [x] No Pressure Injuries Present    [x]Prevention Measures Documented    [] Yes LDA  for Pressure Injury Previously documented     [] Yes New Pressure Injury Discovered   [] LDA for New Pressure Injury Added      Attending RN:  Love Mathews RN

## 2024-01-18 PROBLEM — N17.9 AKI (ACUTE KIDNEY INJURY): Status: ACTIVE | Noted: 2024-01-18

## 2024-01-18 LAB
ALBUMIN SERPL BCP-MCNC: 3.3 G/DL (ref 3.5–5.2)
ALP SERPL-CCNC: 51 U/L (ref 55–135)
ALT SERPL W/O P-5'-P-CCNC: 19 U/L (ref 10–44)
ANION GAP SERPL CALC-SCNC: 12 MMOL/L (ref 8–16)
AST SERPL-CCNC: 26 U/L (ref 10–40)
BASOPHILS # BLD AUTO: 0.03 K/UL (ref 0–0.2)
BASOPHILS NFR BLD: 0.2 % (ref 0–1.9)
BILIRUB SERPL-MCNC: 0.5 MG/DL (ref 0.1–1)
BUN SERPL-MCNC: 36 MG/DL (ref 6–20)
CALCIUM SERPL-MCNC: 7.9 MG/DL (ref 8.7–10.5)
CHLORIDE SERPL-SCNC: 92 MMOL/L (ref 95–110)
CO2 SERPL-SCNC: 26 MMOL/L (ref 23–29)
CREAT SERPL-MCNC: 2.8 MG/DL (ref 0.5–1.4)
DIFFERENTIAL METHOD BLD: ABNORMAL
EOSINOPHIL # BLD AUTO: 0.1 K/UL (ref 0–0.5)
EOSINOPHIL NFR BLD: 0.4 % (ref 0–8)
ERYTHROCYTE [DISTWIDTH] IN BLOOD BY AUTOMATED COUNT: 14.1 % (ref 11.5–14.5)
EST. GFR  (NO RACE VARIABLE): 25 ML/MIN/1.73 M^2
ESTIMATED AVG GLUCOSE: 126 MG/DL (ref 68–131)
GLUCOSE SERPL-MCNC: 52 MG/DL (ref 70–110)
GLUCOSE SERPL-MCNC: 53 MG/DL (ref 70–110)
HBA1C MFR BLD: 6 % (ref 4–5.6)
HCT VFR BLD AUTO: 31.5 % (ref 40–54)
HGB BLD-MCNC: 10.7 G/DL (ref 14–18)
IMM GRANULOCYTES # BLD AUTO: 0.08 K/UL (ref 0–0.04)
IMM GRANULOCYTES NFR BLD AUTO: 0.6 % (ref 0–0.5)
LYMPHOCYTES # BLD AUTO: 2.2 K/UL (ref 1–4.8)
LYMPHOCYTES NFR BLD: 15.1 % (ref 18–48)
MCH RBC QN AUTO: 28.2 PG (ref 27–31)
MCHC RBC AUTO-ENTMCNC: 34 G/DL (ref 32–36)
MCV RBC AUTO: 83 FL (ref 82–98)
MONOCYTES # BLD AUTO: 1.3 K/UL (ref 0.3–1)
MONOCYTES NFR BLD: 9.1 % (ref 4–15)
NEUTROPHILS # BLD AUTO: 10.6 K/UL (ref 1.8–7.7)
NEUTROPHILS NFR BLD: 74.6 % (ref 38–73)
NRBC BLD-RTO: 0 /100 WBC
PLATELET # BLD AUTO: 356 K/UL (ref 150–450)
PMV BLD AUTO: 10 FL (ref 9.2–12.9)
POCT GLUCOSE: 103 MG/DL (ref 70–110)
POCT GLUCOSE: 109 MG/DL (ref 70–110)
POCT GLUCOSE: 127 MG/DL (ref 70–110)
POCT GLUCOSE: 132 MG/DL (ref 70–110)
POCT GLUCOSE: 138 MG/DL (ref 70–110)
POCT GLUCOSE: 138 MG/DL (ref 70–110)
POCT GLUCOSE: 140 MG/DL (ref 70–110)
POCT GLUCOSE: 143 MG/DL (ref 70–110)
POCT GLUCOSE: 148 MG/DL (ref 70–110)
POCT GLUCOSE: 149 MG/DL (ref 70–110)
POCT GLUCOSE: 32 MG/DL (ref 70–110)
POCT GLUCOSE: 47 MG/DL (ref 70–110)
POCT GLUCOSE: 59 MG/DL (ref 70–110)
POCT GLUCOSE: 63 MG/DL (ref 70–110)
POCT GLUCOSE: 64 MG/DL (ref 70–110)
POCT GLUCOSE: 66 MG/DL (ref 70–110)
POCT GLUCOSE: 71 MG/DL (ref 70–110)
POCT GLUCOSE: 73 MG/DL (ref 70–110)
POCT GLUCOSE: 77 MG/DL (ref 70–110)
POCT GLUCOSE: 83 MG/DL (ref 70–110)
POCT GLUCOSE: 84 MG/DL (ref 70–110)
POCT GLUCOSE: 85 MG/DL (ref 70–110)
POCT GLUCOSE: 96 MG/DL (ref 70–110)
POCT GLUCOSE: 97 MG/DL (ref 70–110)
POTASSIUM SERPL-SCNC: 2.9 MMOL/L (ref 3.5–5.1)
POTASSIUM SERPL-SCNC: 3 MMOL/L (ref 3.5–5.1)
PROT SERPL-MCNC: 6.2 G/DL (ref 6–8.4)
RBC # BLD AUTO: 3.8 M/UL (ref 4.6–6.2)
SODIUM SERPL-SCNC: 130 MMOL/L (ref 136–145)
WBC # BLD AUTO: 14.22 K/UL (ref 3.9–12.7)

## 2024-01-18 PROCEDURE — 84132 ASSAY OF SERUM POTASSIUM: CPT | Performed by: STUDENT IN AN ORGANIZED HEALTH CARE EDUCATION/TRAINING PROGRAM

## 2024-01-18 PROCEDURE — 25000003 PHARM REV CODE 250: Performed by: STUDENT IN AN ORGANIZED HEALTH CARE EDUCATION/TRAINING PROGRAM

## 2024-01-18 PROCEDURE — 85025 COMPLETE CBC W/AUTO DIFF WBC: CPT | Performed by: HOSPITALIST

## 2024-01-18 PROCEDURE — 94761 N-INVAS EAR/PLS OXIMETRY MLT: CPT

## 2024-01-18 PROCEDURE — 63600175 PHARM REV CODE 636 W HCPCS: Performed by: HOSPITALIST

## 2024-01-18 PROCEDURE — 80053 COMPREHEN METABOLIC PANEL: CPT | Performed by: HOSPITALIST

## 2024-01-18 PROCEDURE — 36415 COLL VENOUS BLD VENIPUNCTURE: CPT | Mod: XB | Performed by: HOSPITALIST

## 2024-01-18 PROCEDURE — 25000003 PHARM REV CODE 250: Performed by: HOSPITALIST

## 2024-01-18 PROCEDURE — 63600175 PHARM REV CODE 636 W HCPCS: Performed by: INTERNAL MEDICINE

## 2024-01-18 PROCEDURE — 96366 THER/PROPH/DIAG IV INF ADDON: CPT

## 2024-01-18 PROCEDURE — 36415 COLL VENOUS BLD VENIPUNCTURE: CPT | Mod: XB | Performed by: STUDENT IN AN ORGANIZED HEALTH CARE EDUCATION/TRAINING PROGRAM

## 2024-01-18 PROCEDURE — 96375 TX/PRO/DX INJ NEW DRUG ADDON: CPT

## 2024-01-18 PROCEDURE — 63600175 PHARM REV CODE 636 W HCPCS: Mod: JZ,JG | Performed by: STUDENT IN AN ORGANIZED HEALTH CARE EDUCATION/TRAINING PROGRAM

## 2024-01-18 PROCEDURE — 20000000 HC ICU ROOM

## 2024-01-18 PROCEDURE — 96372 THER/PROPH/DIAG INJ SC/IM: CPT | Performed by: STUDENT IN AN ORGANIZED HEALTH CARE EDUCATION/TRAINING PROGRAM

## 2024-01-18 PROCEDURE — 96376 TX/PRO/DX INJ SAME DRUG ADON: CPT

## 2024-01-18 PROCEDURE — 99900035 HC TECH TIME PER 15 MIN (STAT)

## 2024-01-18 PROCEDURE — 82947 ASSAY GLUCOSE BLOOD QUANT: CPT | Performed by: STUDENT IN AN ORGANIZED HEALTH CARE EDUCATION/TRAINING PROGRAM

## 2024-01-18 RX ORDER — MUPIROCIN 20 MG/G
OINTMENT TOPICAL 2 TIMES DAILY
Status: DISCONTINUED | OUTPATIENT
Start: 2024-01-18 | End: 2024-01-20 | Stop reason: HOSPADM

## 2024-01-18 RX ORDER — POTASSIUM CHLORIDE 20 MEQ/1
40 TABLET, EXTENDED RELEASE ORAL ONCE
Status: COMPLETED | OUTPATIENT
Start: 2024-01-18 | End: 2024-01-18

## 2024-01-18 RX ORDER — HYDROMORPHONE HYDROCHLORIDE 1 MG/ML
0.2 INJECTION, SOLUTION INTRAMUSCULAR; INTRAVENOUS; SUBCUTANEOUS EVERY 6 HOURS PRN
Status: DISCONTINUED | OUTPATIENT
Start: 2024-01-18 | End: 2024-01-20 | Stop reason: HOSPADM

## 2024-01-18 RX ORDER — ALUMINUM HYDROXIDE, MAGNESIUM HYDROXIDE, AND SIMETHICONE 1200; 120; 1200 MG/30ML; MG/30ML; MG/30ML
30 SUSPENSION ORAL ONCE
Status: DISCONTINUED | OUTPATIENT
Start: 2024-01-18 | End: 2024-01-20 | Stop reason: HOSPADM

## 2024-01-18 RX ORDER — POTASSIUM CHLORIDE 20 MEQ/1
20 TABLET, EXTENDED RELEASE ORAL ONCE
Status: COMPLETED | OUTPATIENT
Start: 2024-01-18 | End: 2024-01-18

## 2024-01-18 RX ADMIN — POTASSIUM CHLORIDE 40 MEQ: 1500 TABLET, EXTENDED RELEASE ORAL at 08:01

## 2024-01-18 RX ADMIN — DEXTROSE MONOHYDRATE: 100 INJECTION, SOLUTION INTRAVENOUS at 06:01

## 2024-01-18 RX ADMIN — ALUMINUM HYDROXIDE, MAGNESIUM HYDROXIDE, AND SIMETHICONE 30 ML: 1200; 120; 1200 SUSPENSION ORAL at 12:01

## 2024-01-18 RX ADMIN — PROCHLORPERAZINE EDISYLATE 5 MG: 5 INJECTION INTRAMUSCULAR; INTRAVENOUS at 11:01

## 2024-01-18 RX ADMIN — ONDANSETRON 4 MG: 2 INJECTION INTRAMUSCULAR; INTRAVENOUS at 08:01

## 2024-01-18 RX ADMIN — HYDROMORPHONE HYDROCHLORIDE 0.2 MG: 1 INJECTION, SOLUTION INTRAMUSCULAR; INTRAVENOUS; SUBCUTANEOUS at 03:01

## 2024-01-18 RX ADMIN — ACETAMINOPHEN 650 MG: 325 TABLET ORAL at 05:01

## 2024-01-18 RX ADMIN — TAMSULOSIN HYDROCHLORIDE 0.4 MG: 0.4 CAPSULE ORAL at 08:01

## 2024-01-18 RX ADMIN — AMLODIPINE BESYLATE 10 MG: 5 TABLET ORAL at 08:01

## 2024-01-18 RX ADMIN — DEXTROSE MONOHYDRATE 125 ML: 100 INJECTION, SOLUTION INTRAVENOUS at 06:01

## 2024-01-18 RX ADMIN — DEXTROSE MONOHYDRATE: 100 INJECTION, SOLUTION INTRAVENOUS at 12:01

## 2024-01-18 RX ADMIN — POTASSIUM CHLORIDE 20 MEQ: 1500 TABLET, EXTENDED RELEASE ORAL at 04:01

## 2024-01-18 RX ADMIN — PROCHLORPERAZINE EDISYLATE 5 MG: 5 INJECTION INTRAMUSCULAR; INTRAVENOUS at 05:01

## 2024-01-18 RX ADMIN — ALUMINUM HYDROXIDE, MAGNESIUM HYDROXIDE, AND SIMETHICONE 30 ML: 1200; 120; 1200 SUSPENSION ORAL at 01:01

## 2024-01-18 RX ADMIN — DEXTROSE MONOHYDRATE 125 ML: 100 INJECTION, SOLUTION INTRAVENOUS at 04:01

## 2024-01-18 RX ADMIN — ALUMINUM HYDROXIDE, MAGNESIUM HYDROXIDE, AND SIMETHICONE 30 ML: 1200; 120; 1200 SUSPENSION ORAL at 09:01

## 2024-01-18 RX ADMIN — LOSARTAN POTASSIUM AND HYDROCHLOROTHIAZIDE 1 TABLET: 12.5; 5 TABLET ORAL at 08:01

## 2024-01-18 RX ADMIN — MUPIROCIN: 20 OINTMENT TOPICAL at 08:01

## 2024-01-18 RX ADMIN — DEXTROSE MONOHYDRATE: 100 INJECTION, SOLUTION INTRAVENOUS at 07:01

## 2024-01-18 RX ADMIN — GLUCAGON 1 MG: 1 INJECTION, POWDER, LYOPHILIZED, FOR SOLUTION INTRAMUSCULAR; INTRAVENOUS at 02:01

## 2024-01-18 RX ADMIN — DEXTROSE MONOHYDRATE 125 ML: 100 INJECTION, SOLUTION INTRAVENOUS at 10:01

## 2024-01-18 RX ADMIN — ONDANSETRON 4 MG: 2 INJECTION INTRAMUSCULAR; INTRAVENOUS at 07:01

## 2024-01-18 RX ADMIN — DEXTROSE MONOHYDRATE: 100 INJECTION, SOLUTION INTRAVENOUS at 04:01

## 2024-01-18 NOTE — PLAN OF CARE
Recommendations    1. Continue diabetic 2000 kcal diet; monitoring PO intake of meals and snacks   2. Continue to monitor weights and labs    3. Collaboration with medical providers    Goals: 1. Pt to meet % EEN/EPN by RD follow up  Nutrition Goal Status: new  Communication of RD Recs:  (POC)    Assessment and Plan    Nutrition Problem  No nutrition dx at this time

## 2024-01-18 NOTE — NURSING
[facility]  ICU Shift Summary  Date: 1/17/2024      Prehospitalization: Home  Admit Date / LOS : 1/17/2024/ 0 days    Diagnosis: Controlled type 2 diabetes mellitus with hypoglycemia, without long-term current use of insulin    Consults:        Active: N/A       Needed: N/A     Code Status: Full Code   Advanced Directive: <no information>    LDA:  Lines/Drains/Airways       Peripheral Intravenous Line  Duration                  Peripheral IV - Single Lumen 01/17/24 1134 20 G Left Antecubital <1 day         Peripheral IV - Single Lumen 01/17/24 20 G Anterior;Proximal;Right Wrist <1 day                  Central Lines/Site/Justification:Patient Does Not Have Central Line  Urinary Cath/Order/Justification:Patient Does Not Have Urinary Catheter    Vasopressors/Infusions:    dextrose 10 % in water (D10W) 200 mL/hr at 01/17/24 1800          GOALS: Volume/ Hemodynamic: MAP >65                     RASS: 0  alert and calm    Pain Management: none       Pain Controlled: not applicable     Rhythm: NSR    Respiratory Device: Room Air                      Most Recent SBT/ SAT: N/A       MOVE Screen: PASS  ICU Liberation: not applicable    VTE Prophylaxis: Ambulation  Mobility: Bedrest, OOB to Chair, and S: Self  Stress Ulcer Prophylaxis: No    Isolation: No active isolations    Dietary:   Current Diet Order   Procedures    Diet diabetic 2000 Calorie; Standard Tray     Order Specific Question:   Total calories:     Answer:   2000 Calorie     Order Specific Question:   Tray type:     Answer:   Standard Tray      Tolerance: yes  Advancement: @ goal    I & O (24h):    Intake/Output Summary (Last 24 hours) at 1/17/2024 1813  Last data filed at 1/17/2024 1800  Gross per 24 hour   Intake 2408.03 ml   Output 250 ml   Net 2158.03 ml        Restraints: No    Significant Dates:  Post Op Date: N/A  Rescue Date: N/A  Imaging/ Diagnostics: N/A    Noteworthy Labs:  CBG    COVID Test: (--)  CBC/Anemia Labs: Coags:    Recent Labs  "  Lab 01/17/24  1132   WBC 14.68*   HGB 11.2*   HCT 32.5*      MCV 81*   RDW 14.1    No results for input(s): "PT", "INR", "APTT" in the last 168 hours.     Chemistries:   Recent Labs   Lab 01/17/24  1132      K 3.6      CO2 29   BUN 36*   CREATININE 2.0*   CALCIUM 8.8   PROT 7.5   BILITOT 0.4   ALKPHOS 57   ALT 21   AST 32   MG 1.2*        Cardiac Enzymes: Ejection Fractions:    No results for input(s): "CPK", "CPKMB", "MB", "TROPONINI" in the last 72 hours. EF   Date Value Ref Range Status   06/06/2021 60 % Final        POCT Glucose: HbA1c:    Recent Labs   Lab 01/17/24  1711 01/17/24  1745 01/17/24  1804   POCTGLUCOSE 51* 67* 67*    No results found for: "HGBA1C"        ICU LOS 2h  Level of Care: Critical Care    Chart Check: 12 HR Done  Shift Summary/Plan for the shift: See careplan  "

## 2024-01-18 NOTE — EICU
Brief Note:    Called for low glucose  Patient already on 200 ml/hr of D 10 infusion.  Advised D 50 1 amp PRN for glucose < 70  Glucagon 1 mg IM PRN.  Monitor accucheck every 30 min

## 2024-01-18 NOTE — ASSESSMENT & PLAN NOTE
Recent diagnosis, started on glipizide, now with persistent hypoglycemia despite treatment. BG at home prior to starting on glipizide was ranging from 75-190s. Pt with likely underlying ckd started on 10 mg of glipizide will likely take some time before pt bg improves. Continue continuous D10 infusion and Q 1 hour Accu-Cheks.  Encourage p.o. intake.  P.r.n. additional IV dextrose available. Encouraged pt to continue to increase po intake.

## 2024-01-18 NOTE — HOSPITAL COURSE
58M with dm2 who presented due to persistent hypoglycemia after being started on glipizide 10 mg.  In the ED, he was found to be hypoglycemic.  Labs also show mild leukocytosis with a normal UA, clear chest x-ray and benign abdominal exam.  Impaired renal function, creatinine/BUN 2.0/36 with unclear baseline.  He was given food, juice, and IV dextrose with temporary improvement of sugar but continued to drop.  Placed on a D10 infusion at a high rate of 200 mL/hour.  Persistent hypoglycemia despite these interventions.  Placed in observation to the ICU for close glucose monitoring with q1h accuchecks. Remains on d10 with mild improvement in hypoglycemia. BG at home only  prior to starting glipizide per pt.     Worsening renal function. Losartan and HCTZ stopped. Had normal renal function 3 years ago, but had severe ARYA with Cr 6 on one read with only a 2.0 afterward. 2.0 maybe baseline, has increased to 2.8 here, likely cause of hypoglycemia. May not be able to be on Metformin outpt either, with Cr cut off at 1.5 and above, maybe glipizide alone but would hold both until stabilized. Will attempt to shut off dextrose gtt.

## 2024-01-18 NOTE — NURSING
After checking glucose, was about to admin 4 glucose tablets for reading of 56 when pt started to vomit profusely. Gave zofran and did not give glucose tablets, instead gave 125 ml D10 bolus a as per orders. Notified JENNIFER James.

## 2024-01-18 NOTE — ASSESSMENT & PLAN NOTE
Chronic, controlled. Latest blood pressure and vitals reviewed-     Temp:  [97.5 °F (36.4 °C)-98.8 °F (37.1 °C)]   Pulse:  []   Resp:  [14-30]   BP: (119-179)/()   SpO2:  [97 %-100 %] .   Home meds for hypertension were reviewed and noted below.   Hypertension Medications               amLODIPine (NORVASC) 10 MG tablet Take 1 tablet (10 mg total) by mouth once daily.    losartan-hydrochlorothiazide 50-12.5 mg (HYZAAR) 50-12.5 mg per tablet TAKE 1 TABLET BY MOUTH ONCE DAILY            While in the hospital, will manage blood pressure as follows; Continue home antihypertensive regimen    Will utilize p.r.n. blood pressure medication only if patient's blood pressure greater than 180/110 and he develops symptoms such as worsening chest pain or shortness of breath.

## 2024-01-18 NOTE — NURSING
Dr. Thurman ok with D10 gtt decreased to 100mls/hr if CBG remain above 100. Ok to decrease further, but pt to remain on gtt for the rest of the night.

## 2024-01-18 NOTE — SUBJECTIVE & OBJECTIVE
Interval History: Pt states he feels ok. No abd pain, but still with some nausea    Review of Systems  Objective:     Vital Signs (Most Recent):  Temp: 98.5 °F (36.9 °C) (01/18/24 0400)  Pulse: 75 (01/18/24 0710)  Resp: 19 (01/18/24 0710)  BP: 132/87 (01/18/24 0530)  SpO2: 98 % (01/18/24 0710) Vital Signs (24h Range):  Temp:  [97.5 °F (36.4 °C)-98.8 °F (37.1 °C)] 98.5 °F (36.9 °C)  Pulse:  [] 75  Resp:  [14-30] 19  SpO2:  [97 %-100 %] 98 %  BP: (119-179)/() 132/87     Weight: 93.4 kg (206 lb)  Body mass index is 27.18 kg/m².    Intake/Output Summary (Last 24 hours) at 1/18/2024 1034  Last data filed at 1/18/2024 1019  Gross per 24 hour   Intake 5723.04 ml   Output 3320 ml   Net 2403.04 ml         Physical Exam  Vitals reviewed.   Constitutional:       General: He is not in acute distress.     Appearance: He is not toxic-appearing.   HENT:      Head: Normocephalic and atraumatic.   Cardiovascular:      Rate and Rhythm: Normal rate and regular rhythm.   Pulmonary:      Effort: Pulmonary effort is normal. No respiratory distress.   Abdominal:      Palpations: Abdomen is soft.      Tenderness: There is no abdominal tenderness.   Musculoskeletal:         General: No swelling or tenderness.      Cervical back: Neck supple. No rigidity.   Skin:     General: Skin is warm and dry.   Neurological:      General: No focal deficit present.      Mental Status: He is alert and oriented to person, place, and time.   Psychiatric:         Mood and Affect: Mood normal.         Behavior: Behavior normal.             Significant Labs: All pertinent labs within the past 24 hours have been reviewed.    Significant Imaging: I have reviewed all pertinent imaging results/findings within the past 24 hours.

## 2024-01-18 NOTE — PROGRESS NOTES
"  [facility] - Intensive Care  Adult Nutrition  Consult Note    SUMMARY     Recommendations    1. Continue diabetic 2000 kcal diet; monitoring PO intake of meals and snacks   2. Continue to monitor weights and labs    3. Collaboration with medical providers    Goals: 1. Pt to meet % EEN/EPN by RD follow up  Nutrition Goal Status: new  Communication of RD Recs:  (POC)    Assessment and Plan    Nutrition Problem  No nutrition dx at this time    Reason for Assessment    Reason For Assessment: identified at risk by screening criteria  Diagnosis: diabetes diagnosis/complications  Relevant Medical History:   Past Medical History:   Diagnosis Date    Diabetes mellitus     HTN (hypertension) 2013    LBP radiating to right leg 2013    Smokes < 1 pack of cigarettes per day 2013    1/2 ppd     Pt identified as at risk per Malnutrition screening tool. Pt currently on diabetic diet with no intake recorded in chart. BMI 27.20, overweight. 8% weight loss x 1 month. Pt with other care at time of visit. RD to continue to monitor and follow up.   Interdisciplinary Rounds: did not attend  Nutrition Discharge Planning: diabetic 2000 kcal diet    Nutrition Risk Screen    Nutrition Risk Screen: no indicators present    Nutrition/Diet History    Food Allergies: NKFA    Anthropometrics    Temp: 98.6 °F (37 °C)  Height Method: Stated  Height: 6' 1" (185.4 cm)  Height (inches): 73 in  Weight Method: Bed Scale  Weight: 93.4 kg (206 lb)  Weight (lb): 206 lb  Ideal Body Weight (IBW), Male: 184 lb  % Ideal Body Weight, Male (lb): 111.96 %  BMI (Calculated): 27.2  BMI Grade: 25 - 29.9 - overweight  Usual Body Weight (UBW), k kg  % Usual Body Weight: 91.8  % Weight Change From Usual Weight: -8.39 %       Lab/Procedures/Meds    Pertinent Labs Reviewed: reviewed  BMP  Lab Results   Component Value Date     (L) 2024    K 3.0 (L) 2024    CL 92 (L) 2024    CO2 26 2024    BUN 36 (H) " 2024    CREATININE 2.8 (H) 2024    CALCIUM 7.9 (L) 2024    ANIONGAP 12 2024    EGFRNORACEVR 25 (A) 2024       Pertinent Medications Reviewed: reviewed  Current Outpatient Medications   Medication Instructions    albuterol (PROVENTIL/VENTOLIN HFA) 90 mcg/actuation inhaler SMARTSI-2 Puff(s) By Mouth 3 Times Daily PRN    amLODIPine (NORVASC) 10 mg, Oral, Daily    fluticasone propionate (FLONASE) 50 mcg/actuation nasal spray SMARTSI-2 Spray(s) Both Nares Daily    glipiZIDE (GLUCOTROL) 10 mg, Oral, Every morning    hydrOXYzine HCL (ATARAX) 25 mg, Oral, Nightly    losartan-hydrochlorothiazide 50-12.5 mg (HYZAAR) 50-12.5 mg per tablet 1 tablet, Oral, Daily    promethazine-dextromethorphan (PROMETHAZINE-DM) 6.25-15 mg/5 mL Syrp 5-10 mLs, Oral, Every 4 hours PRN    tamsulosin (FLOMAX) 0.4 mg, Oral, Daily    TRUE METRIX GLUCOSE METER Misc USE TO CHECK BLOOD SUGAR DAILY AT HOME    TRUE METRIX GLUCOSE TEST STRIP Strp SMARTSIG:Via Meter Daily    ULTRA THIN LANCETS 30 gauge Misc USE TO CHECK BLOOD GLUCOSE EVERY DAY        Estimated/Assessed Needs    Weight Used For Calorie Calculations: 83.5 kg (184 lb)  Energy Calorie Requirements (kcal): 2220 kcal  Energy Need Method: [place] (x 1.3)  Protein Requirements: 93 g  Weight Used For Protein Calculations: 93.4 kg (206 lb)     Estimated Fluid Requirement Method: RDA Method  RDA Method (mL): 2220  CHO Requirement: 277 g      Nutrition Prescription Ordered    Current Diet Order: diabetic diet 2000 kcal    Evaluation of Received Nutrient/Fluid Intake    I/O: i/o  Energy Calories Required: not meeting needs  Protein Required: not meeting needs  Fluid Required: not meeting needs  Comments: lbm /  % Intake of Estimated Energy Needs: 0 - 25 %  % Meal Intake: 0 - 25 %    Nutrition Risk    Level of Risk/Frequency of Follow-up: low       Monitor and Evaluation    Food and Nutrient Intake: food and beverage intake  Food and Nutrient  Adminstration: diet order  Knowledge/Beliefs/Attitudes: food and nutrition knowledge/skill, beliefs and attitudes  Physical Activity and Function: nutrition-related ADLs and IADLs, factors affecting access to physical activity  Anthropometric Measurements: weight, weight change, body mass index  Biochemical Data, Medical Tests and Procedures: glucose/endocrine profile  Nutrition-Focused Physical Findings: overall appearance       Nutrition Follow-Up    RD Follow-up?: Yes    Alvina Menjivar, Registration Eligible, Provisional LDN

## 2024-01-18 NOTE — ASSESSMENT & PLAN NOTE
Patient with acute kidney injury/acute renal failure likely due to pre-renal azotemia due to dehydration ARYA is currently worsening. Baseline creatinine unknown - Labs reviewed- Renal function/electrolytes with Estimated Creatinine Clearance: 32.5 mL/min (A) (based on SCr of 2.8 mg/dL (H)). according to latest data. Monitor urine output and serial BMP and adjust therapy as needed. Avoid nephrotoxins and renally dose meds for GFR listed above.  -query that this could be underlying ckd and likely will cause prolonged hypoglycemia in setting of high dose glipizide and lower renal function.

## 2024-01-18 NOTE — PLAN OF CARE
Pt remains in ICU on room air. D10 @ 200mL/hr continues. Throughout shift, pt was given glucose tablets, received D10 125mL/hr boluses x3, D10 250mL/hr boluses x3, multiple juices, a root beer, and was given a glucagon injection. The lowest blood sugar this shift was 28 and the highest 77. eICU called and D50 was ordered but discontinued by pharmacy since it is not available. Pt had 3 episodes of emesis, complained of headache, dizziness, leg pain, heart burn, and acid reflux. Medicine was given to treat all with moderate/full relief. Adequate urine output. Free of falls, injury, and skin breakdown. Plan of care reviewed with patient.     Problem: Adult Inpatient Plan of Care  Goal: Plan of Care Review  Outcome: Ongoing, Progressing  Goal: Patient-Specific Goal (Individualized)  Outcome: Ongoing, Progressing  Goal: Absence of Hospital-Acquired Illness or Injury  Outcome: Ongoing, Progressing  Goal: Optimal Comfort and Wellbeing  Outcome: Ongoing, Progressing  Goal: Readiness for Transition of Care  Outcome: Ongoing, Progressing     Problem: Infection  Goal: Absence of Infection Signs and Symptoms  Outcome: Ongoing, Progressing     Problem: Diabetes Comorbidity  Goal: Blood Glucose Level Within Targeted Range  Outcome: Ongoing, Progressing     Problem: Skin Injury Risk Increased  Goal: Skin Health and Integrity  Outcome: Ongoing, Progressing

## 2024-01-18 NOTE — PROGRESS NOTES
[facility] Medicine  Progress Note    Patient Name: Ramiro Ca  MRN: [id number]  Patient Class: OP- Observation   Admission Date: 1/17/2024  Length of Stay: 0 days  Attending Physician: Jackson Thurman III, MD  Primary Care Provider: [facility]        Subjective:     Principal Problem:Controlled type 2 diabetes mellitus with hypoglycemia, without long-term current use of insulin        HPI:  58 y.o. male with hypertension and DM2 presents with a complaint of hypoglycemia.  Was recently diagnosed with diabetes and prescribed glipizide 10 mg which he has been taking with breakfast each morning.  Reportedly began to feel clammy, lightheaded, and confused.  Home glucose meter with readings in the 20s and 30s in association with the symptoms.  He would eat candy and drink juice with improvement of symptoms, but they would recur.  Denies fever, chills, cough, SOB, chest pain, palpitations, syncope, nausea, vomiting, diarrhea.  In the ED, he was found to be hypoglycemic.  Labs also show mild leukocytosis with a normal UA, clear chest x-ray and benign abdominal exam.  Impaired renal function, creatinine/BUN 2.0/36 with unclear baseline.  He was given food, juice, and IV dextrose with temporary improvement of sugar but continued to drop.  Placed on a D10 infusion at a high rate of 200 mL/hour.  Persistent hypoglycemia despite these interventions.  Placed in observation to the ICU for close glucose monitoring.    Overview/Hospital Course:  No notes on file    Interval History: Pt states he feels ok. No abd pain, but still with some nausea    Review of Systems  Objective:     Vital Signs (Most Recent):  Temp: 98.5 °F (36.9 °C) (01/18/24 0400)  Pulse: 75 (01/18/24 0710)  Resp: 19 (01/18/24 0710)  BP: 132/87 (01/18/24 0530)  SpO2: 98 % (01/18/24 0710) Vital Signs (24h Range):  Temp:  [97.5 °F (36.4 °C)-98.8 °F (37.1 °C)] 98.5 °F (36.9 °C)  Pulse:  [] 75  Resp:  [14-30] 19  SpO2:   [97 %-100 %] 98 %  BP: (119-179)/() 132/87     Weight: 93.4 kg (206 lb)  Body mass index is 27.18 kg/m².    Intake/Output Summary (Last 24 hours) at 1/18/2024 1034  Last data filed at 1/18/2024 1019  Gross per 24 hour   Intake 5723.04 ml   Output 3320 ml   Net 2403.04 ml         Physical Exam  Vitals reviewed.   Constitutional:       General: He is not in acute distress.     Appearance: He is not toxic-appearing.   HENT:      Head: Normocephalic and atraumatic.   Cardiovascular:      Rate and Rhythm: Normal rate and regular rhythm.   Pulmonary:      Effort: Pulmonary effort is normal. No respiratory distress.   Abdominal:      Palpations: Abdomen is soft.      Tenderness: There is no abdominal tenderness.   Musculoskeletal:         General: No swelling or tenderness.      Cervical back: Neck supple. No rigidity.   Skin:     General: Skin is warm and dry.   Neurological:      General: No focal deficit present.      Mental Status: He is alert and oriented to person, place, and time.   Psychiatric:         Mood and Affect: Mood normal.         Behavior: Behavior normal.             Significant Labs: All pertinent labs within the past 24 hours have been reviewed.    Significant Imaging: I have reviewed all pertinent imaging results/findings within the past 24 hours.    Assessment/Plan:      * Controlled type 2 diabetes mellitus with hypoglycemia, without long-term current use of insulin  Recent diagnosis, started on glipizide, now with persistent hypoglycemia despite treatment. BG at home prior to starting on glipizide was ranging from 75-190s. Pt with likely underlying ckd started on 10 mg of glipizide will likely take some time before pt bg improves. Continue continuous D10 infusion and Q 1 hour Accu-Cheks.  Encourage p.o. intake.  P.r.n. additional IV dextrose available. Encouraged pt to continue to increase po intake.    ARYA (acute kidney injury)  Patient with acute kidney injury/acute renal failure likely  due to pre-renal azotemia due to dehydration ARYA is currently worsening. Baseline creatinine unknown - Labs reviewed- Renal function/electrolytes with Estimated Creatinine Clearance: 32.5 mL/min (A) (based on SCr of 2.8 mg/dL (H)). according to latest data. Monitor urine output and serial BMP and adjust therapy as needed. Avoid nephrotoxins and renally dose meds for GFR listed above.  -query that this could be underlying ckd and likely will cause prolonged hypoglycemia in setting of high dose glipizide and lower renal function.    Essential hypertension  Chronic, controlled. Latest blood pressure and vitals reviewed-     Temp:  [97.5 °F (36.4 °C)-98.8 °F (37.1 °C)]   Pulse:  []   Resp:  [14-30]   BP: (119-179)/()   SpO2:  [97 %-100 %] .   Home meds for hypertension were reviewed and noted below.   Hypertension Medications               amLODIPine (NORVASC) 10 MG tablet Take 1 tablet (10 mg total) by mouth once daily.    losartan-hydrochlorothiazide 50-12.5 mg (HYZAAR) 50-12.5 mg per tablet TAKE 1 TABLET BY MOUTH ONCE DAILY            While in the hospital, will manage blood pressure as follows; Continue home antihypertensive regimen    Will utilize p.r.n. blood pressure medication only if patient's blood pressure greater than 180/110 and he develops symptoms such as worsening chest pain or shortness of breath.    GERD (gastroesophageal reflux disease)  Continue home medications        VTE Risk Mitigation (From admission, onward)           Ordered     IP VTE LOW RISK PATIENT  Once         01/17/24 1352     Place sequential compression device  Until discontinued         01/17/24 1352                    Discharge Planning   SELIN: 1/20/2024     Code Status: Full Code   Is the patient medically ready for discharge?:     Reason for patient still in hospital (select all that apply): Patient unstable, Laboratory test, and Treatment               Critical care time spent on the evaluation and treatment of severe  organ dysfunction, review of pertinent labs and imaging studies, discussions with consulting providers and discussions with patient/family: 35 minutes.      Jackson Thurman III, MD  Department of Hospital Medicine   [facility] - [place] - Intensive Care

## 2024-01-18 NOTE — PLAN OF CARE
Case Management Assessment     PCP: [facility]  Pharmacy: [facility] [place] [facility] [place]      Patient Arrived From: home  Existing Help at Home: Paz CARTER    Barriers to Discharge: none    Discharge Plan:    A. Home with SO   B. Home with SO        This patient has been screened for Case Management needs.  Treatment is ongoing in the ICU at this time.     Patient stated that he does not have a living will or advance directive.  Patient's choice of someone to speak on his behalf if unable:  RAUL Akhtar.    CM provided patient with contact info and encouraged him to call for any discharge needs.  CM will continue to follow while in the ICU and assist with DC planning as needed.        01/18/24 1332   Discharge Assessment   Assessment Type Discharge Planning Assessment   Confirmed/corrected address, phone number and insurance Yes   Confirmed Demographics Correct on Facesheet   Source of Information patient   Communicated SELIN with patient/caregiver Yes   Reason For Admission Hypoglycemia   People in Home significant other   Do you expect to return to your current living situation? Yes   Do you have help at home or someone to help you manage your care at home? Yes   Prior to hospitilization cognitive status: Alert/Oriented   Current cognitive status: Alert/Oriented   Walking or Climbing Stairs Difficulty no   Dressing/Bathing Difficulty no   Home Layout Able to live on 1st floor   Equipment Currently Used at Home none   Readmission within 30 days? No   Patient currently being followed by outpatient case management? No   Do you currently have service(s) that help you manage your care at home? No   Do you take prescription medications? Yes   Do you have prescription coverage? Yes   Coverage BCBS   Do you have any problems affording any of your prescribed medications? No   Is the patient taking medications as prescribed? yes   Who is going to help you get home at discharge? Paz CARTER   How do you get to doctors  appointments? car, drives self   Are you on dialysis? No   Do you take coumadin? No   Discharge Plan A Home with family   Discharge Plan B Home with family   DME Needed Upon Discharge  glucometer   Discharge Plan discussed with: Patient   Transition of Care Barriers None   OTHER   Name(s) of People in Home Paz Roblero

## 2024-01-18 NOTE — NURSING
Pt continues to remain hypoglycemic despite D10W gtt. Dr. Pepper given to help maintain glucose WDL. Pt noted to have 2 bags of emesis. CBG of 63 - bolus given as oral route is not reliable. MD aware.

## 2024-01-18 NOTE — NURSING
[facility] [place] Intensive Care  ICU Shift Summary  Date: 1/18/2024      Prehospitalization: Home  Admit Date / LOS : 1/17/2024/ 0 days    Diagnosis: Controlled type 2 diabetes mellitus with hypoglycemia, without long-term current use of insulin    Consults:        Active: N/A       Needed: N/A     Code Status: Full Code   Advanced Directive: <no information>    LDA:  Lines/Drains/Airways       Peripheral Intravenous Line  Duration                  Peripheral IV - Single Lumen 01/18/24 1430 20 G Left;Posterior Hand <1 day         Peripheral IV - Single Lumen 01/18/24 1430 20 G Posterior;Right Hand <1 day                  Central Lines/Site/Justification:Patient Does Not Have Central Line  Urinary Cath/Order/Justification:Patient Does Not Have Urinary Catheter    Vasopressors/Infusions:    dextrose 10 % in water (D10W) 200 mL/hr at 01/18/24 1400          GOALS: Volume/ Hemodynamic: MAP >65                     RASS: 0  alert and calm    Pain Management: none       Pain Controlled: not applicable     Rhythm: NSR    Respiratory Device: Room Air                      Most Recent SBT/ SAT: N/A       MOVE Screen: PASS  ICU Liberation: not applicable    VTE Prophylaxis: Pharm and Ambulation  Mobility: Ambulatory, Bedrest, S: Self, and A: Assist  Stress Ulcer Prophylaxis: No    Isolation: No active isolations    Dietary:   Current Diet Order   Procedures    Diet diabetic 2000 Calorie; Standard Tray     Order Specific Question:   Total calories:     Answer:   2000 Calorie     Order Specific Question:   Tray type:     Answer:   Standard Tray      Tolerance: no  Advancement: no    I & O (24h):    Intake/Output Summary (Last 24 hours) at 1/18/2024 1523  Last data filed at 1/18/2024 1400  Gross per 24 hour   Intake 5384.15 ml   Output 3920 ml   Net 1464.15 ml        Restraints: No    Significant Dates:  Post Op Date: N/A  Rescue Date: N/A  Imaging/ Diagnostics: N/A    Noteworthy Labs:  K, cretainine    COVID Test: (--)  CBC/Anemia  "Labs: Coags:    Recent Labs   Lab 01/17/24  1132 01/18/24  0514   WBC 14.68* 14.22*   HGB 11.2* 10.7*   HCT 32.5* 31.5*    356   MCV 81* 83   RDW 14.1 14.1    No results for input(s): "PT", "INR", "APTT" in the last 168 hours.     Chemistries:   Recent Labs   Lab 01/17/24  1132 01/18/24  0514 01/18/24  1210    130*  --    K 3.6 2.9* 3.0*    92*  --    CO2 29 26  --    BUN 36* 36*  --    CREATININE 2.0* 2.8*  --    CALCIUM 8.8 7.9*  --    PROT 7.5 6.2  --    BILITOT 0.4 0.5  --    ALKPHOS 57 51*  --    ALT 21 19  --    AST 32 26  --    MG 1.2*  --   --         Cardiac Enzymes: Ejection Fractions:    No results for input(s): "CPK", "CPKMB", "MB", "TROPONINI" in the last 72 hours. EF   Date Value Ref Range Status   06/06/2021 60 % Final        POCT Glucose: HbA1c:    Recent Labs   Lab 01/18/24  1311 01/18/24  1407 01/18/24  1510   POCTGLUCOSE 103 97 109    Hemoglobin A1C   Date Value Ref Range Status   01/17/2024 6.0 (H) 4.0 - 5.6 % Final     Comment:     ADA Screening Guidelines:  5.7-6.4%  Consistent with prediabetes  >or=6.5%  Consistent with diabetes    High levels of fetal hemoglobin interfere with the HbA1C  assay. Heterozygous hemoglobin variants (HbS, HgC, etc)do  not significantly interfere with this assay.   However, presence of multiple variants may affect accuracy.             ICU LOS 1d  Level of Care: Critical Care    Chart Check: 12 HR Done  Shift Summary/Plan for the shift: See careplan  "

## 2024-01-18 NOTE — NURSING
[facility]  Nurses Note -- 4 Eyes      1/17/2024       Skin assessed on: Q Shift      [x] No Pressure Injuries Present    [x]Prevention Measures Documented    [] Yes LDA  for Pressure Injury Previously documented     [] Yes New Pressure Injury Discovered   [] LDA for New Pressure Injury Added      Attending RN:  Erika Blood RN     Second RN:  JENNIFER Aleman

## 2024-01-19 LAB
ALBUMIN SERPL BCP-MCNC: 3.4 G/DL (ref 3.5–5.2)
ALP SERPL-CCNC: 59 U/L (ref 55–135)
ALT SERPL W/O P-5'-P-CCNC: 15 U/L (ref 10–44)
ANION GAP SERPL CALC-SCNC: 10 MMOL/L (ref 8–16)
AST SERPL-CCNC: 24 U/L (ref 10–40)
BASOPHILS # BLD AUTO: 0.03 K/UL (ref 0–0.2)
BASOPHILS NFR BLD: 0.2 % (ref 0–1.9)
BILIRUB SERPL-MCNC: 0.6 MG/DL (ref 0.1–1)
BUN SERPL-MCNC: 30 MG/DL (ref 6–20)
CALCIUM SERPL-MCNC: 8.2 MG/DL (ref 8.7–10.5)
CHLORIDE SERPL-SCNC: 88 MMOL/L (ref 95–110)
CO2 SERPL-SCNC: 30 MMOL/L (ref 23–29)
CREAT SERPL-MCNC: 2.8 MG/DL (ref 0.5–1.4)
DIFFERENTIAL METHOD BLD: ABNORMAL
EOSINOPHIL # BLD AUTO: 0.1 K/UL (ref 0–0.5)
EOSINOPHIL NFR BLD: 0.4 % (ref 0–8)
ERYTHROCYTE [DISTWIDTH] IN BLOOD BY AUTOMATED COUNT: 13.5 % (ref 11.5–14.5)
EST. GFR  (NO RACE VARIABLE): 25 ML/MIN/1.73 M^2
GLUCOSE SERPL-MCNC: 136 MG/DL (ref 70–110)
HCT VFR BLD AUTO: 29.7 % (ref 40–54)
HGB BLD-MCNC: 9.9 G/DL (ref 14–18)
IMM GRANULOCYTES # BLD AUTO: 0.09 K/UL (ref 0–0.04)
IMM GRANULOCYTES NFR BLD AUTO: 0.7 % (ref 0–0.5)
LYMPHOCYTES # BLD AUTO: 1.6 K/UL (ref 1–4.8)
LYMPHOCYTES NFR BLD: 12.9 % (ref 18–48)
MAGNESIUM SERPL-MCNC: 1.7 MG/DL (ref 1.6–2.6)
MCH RBC QN AUTO: 27.3 PG (ref 27–31)
MCHC RBC AUTO-ENTMCNC: 33.3 G/DL (ref 32–36)
MCV RBC AUTO: 82 FL (ref 82–98)
MONOCYTES # BLD AUTO: 1.4 K/UL (ref 0.3–1)
MONOCYTES NFR BLD: 10.8 % (ref 4–15)
NEUTROPHILS # BLD AUTO: 9.4 K/UL (ref 1.8–7.7)
NEUTROPHILS NFR BLD: 75 % (ref 38–73)
NRBC BLD-RTO: 0 /100 WBC
PHOSPHATE SERPL-MCNC: 2.4 MG/DL (ref 2.7–4.5)
PLATELET # BLD AUTO: 335 K/UL (ref 150–450)
PMV BLD AUTO: 9.6 FL (ref 9.2–12.9)
POCT GLUCOSE: 133 MG/DL (ref 70–110)
POCT GLUCOSE: 133 MG/DL (ref 70–110)
POCT GLUCOSE: 137 MG/DL (ref 70–110)
POCT GLUCOSE: 137 MG/DL (ref 70–110)
POCT GLUCOSE: 141 MG/DL (ref 70–110)
POCT GLUCOSE: 141 MG/DL (ref 70–110)
POCT GLUCOSE: 144 MG/DL (ref 70–110)
POCT GLUCOSE: 146 MG/DL (ref 70–110)
POCT GLUCOSE: 147 MG/DL (ref 70–110)
POCT GLUCOSE: 154 MG/DL (ref 70–110)
POTASSIUM SERPL-SCNC: 3 MMOL/L (ref 3.5–5.1)
POTASSIUM SERPL-SCNC: 3.5 MMOL/L (ref 3.5–5.1)
PROT SERPL-MCNC: 6.1 G/DL (ref 6–8.4)
RBC # BLD AUTO: 3.62 M/UL (ref 4.6–6.2)
SODIUM SERPL-SCNC: 128 MMOL/L (ref 136–145)
WBC # BLD AUTO: 12.54 K/UL (ref 3.9–12.7)

## 2024-01-19 PROCEDURE — 63600175 PHARM REV CODE 636 W HCPCS: Performed by: INTERNAL MEDICINE

## 2024-01-19 PROCEDURE — 84100 ASSAY OF PHOSPHORUS: CPT | Performed by: HOSPITALIST

## 2024-01-19 PROCEDURE — 36415 COLL VENOUS BLD VENIPUNCTURE: CPT | Performed by: INTERNAL MEDICINE

## 2024-01-19 PROCEDURE — 11000001 HC ACUTE MED/SURG PRIVATE ROOM

## 2024-01-19 PROCEDURE — 99900035 HC TECH TIME PER 15 MIN (STAT)

## 2024-01-19 PROCEDURE — 25000003 PHARM REV CODE 250: Performed by: INTERNAL MEDICINE

## 2024-01-19 PROCEDURE — 25000003 PHARM REV CODE 250: Performed by: HOSPITALIST

## 2024-01-19 PROCEDURE — 94761 N-INVAS EAR/PLS OXIMETRY MLT: CPT

## 2024-01-19 PROCEDURE — 25000003 PHARM REV CODE 250: Performed by: STUDENT IN AN ORGANIZED HEALTH CARE EDUCATION/TRAINING PROGRAM

## 2024-01-19 PROCEDURE — 84132 ASSAY OF SERUM POTASSIUM: CPT | Performed by: INTERNAL MEDICINE

## 2024-01-19 PROCEDURE — 85025 COMPLETE CBC W/AUTO DIFF WBC: CPT | Performed by: HOSPITALIST

## 2024-01-19 PROCEDURE — 36415 COLL VENOUS BLD VENIPUNCTURE: CPT | Mod: XB | Performed by: HOSPITALIST

## 2024-01-19 PROCEDURE — 80053 COMPREHEN METABOLIC PANEL: CPT | Performed by: HOSPITALIST

## 2024-01-19 PROCEDURE — 83735 ASSAY OF MAGNESIUM: CPT | Performed by: HOSPITALIST

## 2024-01-19 RX ORDER — DEXTROSE MONOHYDRATE 100 MG/ML
INJECTION, SOLUTION INTRAVENOUS CONTINUOUS
Status: DISCONTINUED | OUTPATIENT
Start: 2024-01-19 | End: 2024-01-19

## 2024-01-19 RX ORDER — POTASSIUM CHLORIDE 20 MEQ/1
40 TABLET, EXTENDED RELEASE ORAL
Status: COMPLETED | OUTPATIENT
Start: 2024-01-19 | End: 2024-01-19

## 2024-01-19 RX ORDER — POTASSIUM CHLORIDE 7.45 MG/ML
10 INJECTION INTRAVENOUS
Status: COMPLETED | OUTPATIENT
Start: 2024-01-19 | End: 2024-01-19

## 2024-01-19 RX ADMIN — DEXTROSE MONOHYDRATE: 100 INJECTION, SOLUTION INTRAVENOUS at 12:01

## 2024-01-19 RX ADMIN — MUPIROCIN 1 G: 20 OINTMENT TOPICAL at 08:01

## 2024-01-19 RX ADMIN — POTASSIUM CHLORIDE 10 MEQ: 7.46 INJECTION, SOLUTION INTRAVENOUS at 06:01

## 2024-01-19 RX ADMIN — POTASSIUM CHLORIDE 40 MEQ: 1500 TABLET, EXTENDED RELEASE ORAL at 08:01

## 2024-01-19 RX ADMIN — POTASSIUM CHLORIDE 40 MEQ: 1500 TABLET, EXTENDED RELEASE ORAL at 12:01

## 2024-01-19 RX ADMIN — TAMSULOSIN HYDROCHLORIDE 0.4 MG: 0.4 CAPSULE ORAL at 08:01

## 2024-01-19 RX ADMIN — ACETAMINOPHEN 650 MG: 325 TABLET ORAL at 08:01

## 2024-01-19 RX ADMIN — AMLODIPINE BESYLATE 10 MG: 5 TABLET ORAL at 08:01

## 2024-01-19 RX ADMIN — MUPIROCIN: 20 OINTMENT TOPICAL at 08:01

## 2024-01-19 RX ADMIN — POTASSIUM CHLORIDE 10 MEQ: 7.46 INJECTION, SOLUTION INTRAVENOUS at 05:01

## 2024-01-19 NOTE — PLAN OF CARE
Pt remains in ICU on D10 @ 25mL/hr. BS have been 130-140s all night D10 weaning off per Dr. Jay. 2 episodes of emesis, Zofran and Mallox given, relief obtained. Adequate urine output. Free of falls, injury, and skin breakdown. Linen & gown changed.     Problem: Adult Inpatient Plan of Care  Goal: Plan of Care Review  Outcome: Ongoing, Progressing  Goal: Patient-Specific Goal (Individualized)  Outcome: Ongoing, Progressing  Goal: Absence of Hospital-Acquired Illness or Injury  Outcome: Ongoing, Progressing  Goal: Optimal Comfort and Wellbeing  Outcome: Ongoing, Progressing  Goal: Readiness for Transition of Care  Outcome: Ongoing, Progressing     Problem: Infection  Goal: Absence of Infection Signs and Symptoms  Outcome: Ongoing, Progressing     Problem: Diabetes Comorbidity  Goal: Blood Glucose Level Within Targeted Range  Outcome: Ongoing, Progressing     Problem: Skin Injury Risk Increased  Goal: Skin Health and Integrity  Outcome: Ongoing, Progressing     Problem: Fluid and Electrolyte Imbalance (Acute Kidney Injury/Impairment)  Goal: Fluid and Electrolyte Balance  Outcome: Ongoing, Progressing     Problem: Oral Intake Inadequate (Acute Kidney Injury/Impairment)  Goal: Optimal Nutrition Intake  Outcome: Ongoing, Progressing     Problem: Renal Function Impairment (Acute Kidney Injury/Impairment)  Goal: Effective Renal Function  Outcome: Ongoing, Progressing

## 2024-01-19 NOTE — EICU
EICU FOLLOWUP NOTE:    Hypoglycemia.  The patient is on D10W at 100 cc/h.  Blood glucose level more than 100.  Decreased D10W to 75 cc/h and continue to monitor blood glucose levels.  We will decrease D10W as tolerated    Dakotah Jay MD  [facility]  [phone]    1:41 AM    Blood glucose level consistently more than 100, decrease D10 to 40 cc/h and continue to follow blood glucose level    4:50 AM    Blood glucose levels consistently more than 100.  Decrease D10 to 25 cc/h.  Can discontinue once the patient is able to tolerate diet. ( Having nausea as per bedside RN)  Hypokalemia.  Replacement ordered.  Follow-up potassium level

## 2024-01-19 NOTE — NURSING
[facility] [place]  Nurses Note -- 4 Eyes      1/18/2024       Skin assessed on: Q Shift      [x] No Pressure Injuries Present    [x]Prevention Measures Documented    [] Yes LDA  for Pressure Injury Previously documented     [] Yes New Pressure Injury Discovered   [] LDA for New Pressure Injury Added      Attending RN:  Erika Blood RN     Second RN:  JENNIFER Aleman

## 2024-01-19 NOTE — NURSING
[facility] [place] Intensive Care  ICU Shift Summary  Date: 1/19/2024      Prehospitalization: Home  Admit Date / LOS : 1/17/2024/ 1 days    Diagnosis: Controlled type 2 diabetes mellitus with hypoglycemia, without long-term current use of insulin    Consults:        Active: N/A       Needed: N/A     Code Status: Full Code   Advanced Directive: <no information>    LDA:  Lines/Drains/Airways       Peripheral Intravenous Line  Duration                  Peripheral IV - Single Lumen 01/18/24 1430 20 G Left;Posterior Hand <1 day         Peripheral IV - Single Lumen 01/19/24 0615 20 G Posterior;Right Forearm <1 day                  Central Lines/Site/Justification:Patient Does Not Have Central Line  Urinary Cath/Order/Justification:Patient Does Not Have Urinary Catheter    Vasopressors/Infusions:        GOALS: Volume/ Hemodynamic: MAP >65                     RASS: 0  alert and calm    Pain Management: none       Pain Controlled: not applicable     Rhythm: NSR    Respiratory Device: Room Air                      Most Recent SBT/ SAT: N/A       MOVE Screen: PASS  ICU Liberation: not applicable    VTE Prophylaxis: Ambulation  Mobility: OOB to Chair, S: Self, and A: Assist  Stress Ulcer Prophylaxis: No    Isolation: No active isolations    Dietary:   Current Diet Order   Procedures    Diet diabetic 2000 Calorie; Standard Tray     Order Specific Question:   Total calories:     Answer:   2000 Calorie     Order Specific Question:   Tray type:     Answer:   Standard Tray      Tolerance: yes  Advancement: @ goal    I & O (24h):    Intake/Output Summary (Last 24 hours) at 1/19/2024 0914  Last data filed at 1/19/2024 0830  Gross per 24 hour   Intake 2222.97 ml   Output 4945 ml   Net -2722.03 ml        Restraints: No    Significant Dates:  Post Op Date: N/A  Rescue Date: N/A  Imaging/ Diagnostics: N/A    Noteworthy Labs:  Creat, K, glucose    COVID Test: (--)  CBC/Anemia Labs: Coags:    Recent Labs   Lab 01/18/24  0514 01/19/24  0338   WBC  "14.22* 12.54   HGB 10.7* 9.9*   HCT 31.5* 29.7*    335   MCV 83 82   RDW 14.1 13.5    No results for input(s): "PT", "INR", "APTT" in the last 168 hours.     Chemistries:   Recent Labs   Lab 01/17/24  1132 01/18/24  0514 01/18/24  1210 01/19/24  0338    130*  --  128*   K 3.6 2.9* 3.0* 3.0*    92*  --  88*   CO2 29 26  --  30*   BUN 36* 36*  --  30*   CREATININE 2.0* 2.8*  --  2.8*   CALCIUM 8.8 7.9*  --  8.2*   PROT 7.5 6.2  --  6.1   BILITOT 0.4 0.5  --  0.6   ALKPHOS 57 51*  --  59   ALT 21 19  --  15   AST 32 26  --  24   MG 1.2*  --   --  1.7   PHOS  --   --   --  2.4*        Cardiac Enzymes: Ejection Fractions:    No results for input(s): "CPK", "CPKMB", "MB", "TROPONINI" in the last 72 hours. EF   Date Value Ref Range Status   06/06/2021 60 % Final        POCT Glucose: HbA1c:    Recent Labs   Lab 01/19/24  0514 01/19/24  0613 01/19/24  0720   POCTGLUCOSE 144* 133* 133*    Hemoglobin A1C   Date Value Ref Range Status   01/17/2024 6.0 (H) 4.0 - 5.6 % Final     Comment:     ADA Screening Guidelines:  5.7-6.4%  Consistent with prediabetes  >or=6.5%  Consistent with diabetes    High levels of fetal hemoglobin interfere with the HbA1C  assay. Heterozygous hemoglobin variants (HbS, HgC, etc)do  not significantly interfere with this assay.   However, presence of multiple variants may affect accuracy.             ICU LOS 1d 17h  Level of Care: OK to Transfer    Chart Check: 12 HR Done  Shift Summary/Plan for the shift: See careplan  "

## 2024-01-19 NOTE — NURSING
Pt arrived to unit via w/c. AAO x4. Pt oriented to room and call light. VSS. No complaints voiced. Plan of care ongoing.

## 2024-01-19 NOTE — ASSESSMENT & PLAN NOTE
Patient with acute kidney injury/acute renal failure likely due to pre-renal azotemia due to dehydration ARYA is currently worsening. Baseline creatinine unknown - Labs reviewed- Renal function/electrolytes with Estimated Creatinine Clearance: 32.5 mL/min (A) (based on SCr of 2.8 mg/dL (H)). according to latest data. Monitor urine output and serial BMP and adjust therapy as needed. Avoid nephrotoxins and renally dose meds for GFR listed above.  -query that this could be underlying ckd and likely will cause prolonged hypoglycemia in setting of high dose glipizide and lower renal function.  -Losartan and HCTZ stopped

## 2024-01-19 NOTE — PLAN OF CARE
Problem: Adult Inpatient Plan of Care  Goal: Plan of Care Review  Outcome: Ongoing, Progressing  Flowsheets (Taken 1/19/2024 1239)  Plan of Care Reviewed With: patient  Goal: Absence of Hospital-Acquired Illness or Injury  Outcome: Ongoing, Progressing  Intervention: Identify and Manage Fall Risk  Flowsheets (Taken 1/19/2024 1239)  Safety Promotion/Fall Prevention:   assistive device/personal item within reach   instructed to call staff for mobility   side rails raised x 2  Intervention: Prevent Skin Injury  Flowsheets (Taken 1/19/2024 1239)  Body Position: position changed independently  Intervention: Prevent Infection  Flowsheets (Taken 1/19/2024 1239)  Infection Prevention:   hand hygiene promoted   rest/sleep promoted   single patient room provided  Goal: Optimal Comfort and Wellbeing  Outcome: Ongoing, Progressing  Intervention: Monitor Pain and Promote Comfort  Flowsheets (Taken 1/19/2024 1239)  Pain Management Interventions:   quiet environment facilitated   care clustered   pillow support provided  Intervention: Provide Person-Centered Care  Flowsheets (Taken 1/19/2024 1239)  Trust Relationship/Rapport:   care explained   choices provided   emotional support provided   empathic listening provided   questions answered   questions encouraged   reassurance provided     Problem: Diabetes Comorbidity  Goal: Blood Glucose Level Within Targeted Range  Outcome: Ongoing, Progressing  Intervention: Monitor and Manage Glycemia  Flowsheets (Taken 1/19/2024 1239)  Glycemic Management: blood glucose monitored

## 2024-01-19 NOTE — ASSESSMENT & PLAN NOTE
Recent diagnosis, started on glipizide, now with persistent hypoglycemia despite treatment. BG at home prior to starting on glipizide was ranging from 75-190s. Pt with likely underlying ckd started on 10 mg of glipizide will likely take some time before pt bg improves. Continue continuous D10 infusion and Q 1 hour Accu-Cheks.  Encourage p.o. intake.  P.r.n. additional IV dextrose available. Encouraged pt to continue to increase po intake.  Newly diabetic and started on Metformin and glipizide.   Poor renal function does not appear to support metformin, maybe glipizide alone but would hold both until stabilized.

## 2024-01-19 NOTE — NURSING TRANSFER
Nursing Transfer Note      1/19/2024   9:16 AM    Nurse giving handoff:Love  Nurse receiving handoff:Darleen  Reason patient is being transferred: LOC    Transfer To: 408    Transfer via wheelchair    Transfer with cardiac monitoring    Transported by RN    Transfer Vital Signs:  Blood Pressure:129/71  Heart Rate:85  O2:95  Temperature:98.7  Respirations:22      Order for Tele Monitor? Yes      4eyes on Skin: yes    Medicines sent: Mupirocin    Any special needs or follow-up needed: Glucose checks, nausea control    Patient belongings transferred with patient: Yes    Chart send with patient: Yes    Notified: spouse    Patient reassessed at: 01/19/2024 0930     Upon arrival to floor: cardiac monitor applied, patient oriented to room, call bell in reach, and bed in lowest position

## 2024-01-19 NOTE — NURSING
[facility] [place]es [place] -- 4 Eyes      1/19/2024       Skin assessed on: Q Shift      [x] No Pressure Injuries Present    [x]Prevention Measures Documented    [] Yes LDA  for Pressure Injury Previously documented     [] Yes New Pressure Injury Discovered   [] LDA for New Pressure Injury Added      Attending RN:  Love Mathews RN     Second RN:  JENNIFER James

## 2024-01-19 NOTE — PROGRESS NOTES
[facility]  Progress Note    Patient Name: Ramiro Ca  MRN: [id number]  Patient Class: IP- Inpatient   Admission Date: 1/17/2024  Length of Stay: 1 days  Attending Physician: Panchito Delatorre MD  Primary Care Provider: [facility]        Subjective:     Principal Problem:Controlled type 2 diabetes mellitus with hypoglycemia, without long-term current use of insulin        HPI:  58 y.o. male with hypertension and DM2 presents with a complaint of hypoglycemia.  Was recently diagnosed with diabetes and prescribed glipizide 10 mg which he has been taking with breakfast each morning.  Reportedly began to feel clammy, lightheaded, and confused.  Home glucose meter with readings in the 20s and 30s in association with the symptoms.  He would eat candy and drink juice with improvement of symptoms, but they would recur.  Denies fever, chills, cough, SOB, chest pain, palpitations, syncope, nausea, vomiting, diarrhea.  In the ED, he was found to be hypoglycemic.  Labs also show mild leukocytosis with a normal UA, clear chest x-ray and benign abdominal exam.  Impaired renal function, creatinine/BUN 2.0/36 with unclear baseline.  He was given food, juice, and IV dextrose with temporary improvement of sugar but continued to drop.  Placed on a D10 infusion at a high rate of 200 mL/hour.  Persistent hypoglycemia despite these interventions.  Placed in observation to the ICU for close glucose monitoring.    Overview/Hospital Course:  58M with dm2 who presented due to persistent hypoglycemia after being started on glipizide 10 mg.  In the ED, he was found to be hypoglycemic.  Labs also show mild leukocytosis with a normal UA, clear chest x-ray and benign abdominal exam.  Impaired renal function, creatinine/BUN 2.0/36 with unclear baseline.  He was given food, juice, and IV dextrose with temporary improvement of sugar but continued to drop.  Placed on a D10 infusion at a high rate of 200  mL/hour.  Persistent hypoglycemia despite these interventions.  Placed in observation to the ICU for close glucose monitoring with q1h accuchecks. Remains on d10 with mild improvement in hypoglycemia. BG at home only  prior to starting glipizide per pt.     Worsening renal function. Losartan and HCTZ stopped. Had normal renal function 3 years ago, but had severe ARYA with Cr 6 on one read with only a 2.0 afterward. 2.0 maybe baseline, has increased to 2.8 here, likely cause of hypoglycemia. May not be able to be on Metformin outpt either, with Cr cut off at 1.5 and above, maybe glipizide alone but would hold both until stabilized. Will attempt to shut off dextrose gtt.     Interval History:  NAEON.  No new issues.   Denies complaints.  All questions answered and updates on care given.       ROS:  General: Negative for fevers or chills.  Cardiac: Negative for chest pain or orthopnea   Pulmonary: Negative for dyspnea or wheezing.  GI: Negative for abdominal distention or pain     Vitals:    01/19/24 0600 01/19/24 0615 01/19/24 0630 01/19/24 0645   BP: (!) 145/88  131/86    Pulse: 92 91 98 85   Resp: 19 20 (!) 22 17   Temp:       TempSrc:       SpO2: 95% 95% 97% 96%   Weight:       Height:              Body mass index is 27.18 kg/m².      PHYSICAL EXAM:  GENERAL APPEARANCE: alert and cooperative, and appears to be in no acute distress.  HEENT:     HEAD: NC/AT     EYES: PERRL, EOMI.  Vision is grossly intact.  NECK: Neck supple, non-tender without LAD, masses or thyromegaly.  CARDIAC: There is no peripheral edema, cyanosis or pallor.   LUNGS: Clear to auscultation and percussion without rales or wheezing  ABDOMEN: Non-distended. No guarding.  MSK: No joint erythema or tenderness.   EXTREMITIES: No significant deformity or joint abnormality. No edema.   NEUROLOGICAL: CN II-XII grossly intact.   SKIN: Skin normal color, texture and turgor with no lesions or eruptions.  PSYCHIATRIC: Oriented. No tangential speech. No  Hyperactive features.        Recent Results (from the past 24 hour(s))   POCT glucose    Collection Time: 01/18/24  8:05 AM   Result Value Ref Range    POCT Glucose 73 70 - 110 mg/dL   POCT glucose    Collection Time: 01/18/24  8:55 AM   Result Value Ref Range    POCT Glucose 83 70 - 110 mg/dL   POCT glucose    Collection Time: 01/18/24  9:51 AM   Result Value Ref Range    POCT Glucose 71 70 - 110 mg/dL   POCT glucose    Collection Time: 01/18/24 10:22 AM   Result Value Ref Range    POCT Glucose 63 (L) 70 - 110 mg/dL   POCT glucose    Collection Time: 01/18/24 11:10 AM   Result Value Ref Range    POCT Glucose 84 70 - 110 mg/dL   POCT glucose    Collection Time: 01/18/24 12:09 PM   Result Value Ref Range    POCT Glucose 96 70 - 110 mg/dL   Potassium    Collection Time: 01/18/24 12:10 PM   Result Value Ref Range    Potassium 3.0 (L) 3.5 - 5.1 mmol/L   POCT glucose    Collection Time: 01/18/24  1:11 PM   Result Value Ref Range    POCT Glucose 103 70 - 110 mg/dL   POCT glucose    Collection Time: 01/18/24  2:07 PM   Result Value Ref Range    POCT Glucose 97 70 - 110 mg/dL   POCT glucose    Collection Time: 01/18/24  3:10 PM   Result Value Ref Range    POCT Glucose 109 70 - 110 mg/dL   POCT glucose    Collection Time: 01/18/24  4:01 PM   Result Value Ref Range    POCT Glucose 127 (H) 70 - 110 mg/dL   POCT glucose    Collection Time: 01/18/24  5:08 PM   Result Value Ref Range    POCT Glucose 149 (H) 70 - 110 mg/dL   POCT glucose    Collection Time: 01/18/24  6:09 PM   Result Value Ref Range    POCT Glucose 143 (H) 70 - 110 mg/dL   POCT glucose    Collection Time: 01/18/24  7:29 PM   Result Value Ref Range    POCT Glucose 132 (H) 70 - 110 mg/dL   POCT glucose    Collection Time: 01/18/24  8:32 PM   Result Value Ref Range    POCT Glucose 138 (H) 70 - 110 mg/dL   POCT glucose    Collection Time: 01/18/24  9:31 PM   Result Value Ref Range    POCT Glucose 140 (H) 70 - 110 mg/dL   POCT glucose    Collection Time: 01/18/24 10:37  PM   Result Value Ref Range    POCT Glucose 138 (H) 70 - 110 mg/dL   POCT glucose    Collection Time: 01/18/24 11:26 PM   Result Value Ref Range    POCT Glucose 148 (H) 70 - 110 mg/dL   POCT glucose    Collection Time: 01/19/24 12:43 AM   Result Value Ref Range    POCT Glucose 137 (H) 70 - 110 mg/dL   POCT glucose    Collection Time: 01/19/24  1:39 AM   Result Value Ref Range    POCT Glucose 154 (H) 70 - 110 mg/dL   POCT glucose    Collection Time: 01/19/24  3:01 AM   Result Value Ref Range    POCT Glucose 146 (H) 70 - 110 mg/dL   Comprehensive Metabolic Panel (CMP)    Collection Time: 01/19/24  3:38 AM   Result Value Ref Range    Sodium 128 (L) 136 - 145 mmol/L    Potassium 3.0 (L) 3.5 - 5.1 mmol/L    Chloride 88 (L) 95 - 110 mmol/L    CO2 30 (H) 23 - 29 mmol/L    Glucose 136 (H) 70 - 110 mg/dL    BUN 30 (H) 6 - 20 mg/dL    Creatinine 2.8 (H) 0.5 - 1.4 mg/dL    Calcium 8.2 (L) 8.7 - 10.5 mg/dL    Total Protein 6.1 6.0 - 8.4 g/dL    Albumin 3.4 (L) 3.5 - 5.2 g/dL    Total Bilirubin 0.6 0.1 - 1.0 mg/dL    Alkaline Phosphatase 59 55 - 135 U/L    AST 24 10 - 40 U/L    ALT 15 10 - 44 U/L    eGFR 25 (A) >60 mL/min/1.73 m^2    Anion Gap 10 8 - 16 mmol/L   CBC with Automated Differential    Collection Time: 01/19/24  3:38 AM   Result Value Ref Range    WBC 12.54 3.90 - 12.70 K/uL    RBC 3.62 (L) 4.60 - 6.20 M/uL    Hemoglobin 9.9 (L) 14.0 - 18.0 g/dL    Hematocrit 29.7 (L) 40.0 - 54.0 %    MCV 82 82 - 98 fL    MCH 27.3 27.0 - 31.0 pg    MCHC 33.3 32.0 - 36.0 g/dL    RDW 13.5 11.5 - 14.5 %    Platelets 335 150 - 450 K/uL    MPV 9.6 9.2 - 12.9 fL    Immature Granulocytes 0.7 (H) 0.0 - 0.5 %    Gran # (ANC) 9.4 (H) 1.8 - 7.7 K/uL    Immature Grans (Abs) 0.09 (H) 0.00 - 0.04 K/uL    Lymph # 1.6 1.0 - 4.8 K/uL    Mono # 1.4 (H) 0.3 - 1.0 K/uL    Eos # 0.1 0.0 - 0.5 K/uL    Baso # 0.03 0.00 - 0.20 K/uL    nRBC 0 0 /100 WBC    Gran % 75.0 (H) 38.0 - 73.0 %    Lymph % 12.9 (L) 18.0 - 48.0 %    Mono % 10.8 4.0 - 15.0 %     Eosinophil % 0.4 0.0 - 8.0 %    Basophil % 0.2 0.0 - 1.9 %    Differential Method Automated    POCT glucose    Collection Time: 01/19/24  4:09 AM   Result Value Ref Range    POCT Glucose 147 (H) 70 - 110 mg/dL   POCT glucose    Collection Time: 01/19/24  5:14 AM   Result Value Ref Range    POCT Glucose 144 (H) 70 - 110 mg/dL   POCT glucose    Collection Time: 01/19/24  6:13 AM   Result Value Ref Range    POCT Glucose 133 (H) 70 - 110 mg/dL   POCT glucose    Collection Time: 01/19/24  7:20 AM   Result Value Ref Range    POCT Glucose 133 (H) 70 - 110 mg/dL       Microbiology Results (last 7 days)       ** No results found for the last 168 hours. **             Imaging Results              X-Ray Chest AP Portable (Final result)  Result time 01/17/24 12:26:55      Final result by Jeromy Coombs MD (01/17/24 12:26:55)                   Impression:      See above      Electronically signed by: Jeromy Coombs MD  Date:    01/17/2024  Time:    12:26               Narrative:    EXAMINATION:  XR CHEST AP PORTABLE    CLINICAL HISTORY:  hypoglycemia & leukocytosis, no sob;    TECHNIQUE:  Single frontal view of the chest was performed.    COMPARISON:  01/08/2024    FINDINGS:  Cardiac size is normal.  Lungs are clear and well aerated with no infiltrate.  Gastric air bubble is distended.                                             Assessment/Plan:      * Controlled type 2 diabetes mellitus with hypoglycemia, without long-term current use of insulin  Recent diagnosis, started on glipizide, now with persistent hypoglycemia despite treatment. BG at home prior to starting on glipizide was ranging from 75-190s. Pt with likely underlying ckd started on 10 mg of glipizide will likely take some time before pt bg improves. Continue continuous D10 infusion and Q 1 hour Accu-Cheks.  Encourage p.o. intake.  P.r.n. additional IV dextrose available. Encouraged pt to continue to increase po intake.  Newly diabetic and started on  Metformin and glipizide.   Poor renal function does not appear to support metformin, maybe glipizide alone but would hold both until stabilized.    ARYA (acute kidney injury)  Patient with acute kidney injury/acute renal failure likely due to pre-renal azotemia due to dehydration ARYA is currently worsening. Baseline creatinine unknown - Labs reviewed- Renal function/electrolytes with Estimated Creatinine Clearance: 32.5 mL/min (A) (based on SCr of 2.8 mg/dL (H)). according to latest data. Monitor urine output and serial BMP and adjust therapy as needed. Avoid nephrotoxins and renally dose meds for GFR listed above.  -query that this could be underlying ckd and likely will cause prolonged hypoglycemia in setting of high dose glipizide and lower renal function.  -Losartan and HCTZ stopped      Essential hypertension  Chronic, controlled. Latest blood pressure and vitals reviewed-     Temp:  [97.5 °F (36.4 °C)-98.8 °F (37.1 °C)]   Pulse:  []   Resp:  [14-30]   BP: (119-179)/()   SpO2:  [97 %-100 %] .   Home meds for hypertension were reviewed and noted below.   Hypertension Medications               amLODIPine (NORVASC) 10 MG tablet Take 1 tablet (10 mg total) by mouth once daily.    losartan-hydrochlorothiazide 50-12.5 mg (HYZAAR) 50-12.5 mg per tablet TAKE 1 TABLET BY MOUTH ONCE DAILY            While in the hospital, will manage blood pressure as follows; Continue home antihypertensive regimen    Will utilize p.r.n. blood pressure medication only if patient's blood pressure greater than 180/110 and he develops symptoms such as worsening chest pain or shortness of breath.    GERD (gastroesophageal reflux disease)  Continue home medications        VTE Risk Mitigation (From admission, onward)           Ordered     IP VTE LOW RISK PATIENT  Once         01/17/24 1352     Place sequential compression device  Until discontinued         01/17/24 1352                    Discharge Planning   SELIN: 1/20/2024     Code  Status: Full Code   Is the patient medically ready for discharge?:     Reason for patient still in hospital (select all that apply): Patient trending condition and Treatment  Discharge Plan A: Home with family            Critical care time spent on the evaluation and treatment of severe organ dysfunction, review of pertinent labs and imaging studies, discussions with consulting providers and discussions with patient/family: 0 minutes.      Panchito Delatorre MD  Department of Hospital Medicine   [facility] - Intensive Care

## 2024-01-19 NOTE — NURSING
Pts glucose has been over 100 for 5 hours. eICU called, Dr. Jay ordered to wean D10 gtt to 75mL/hr. Glucose continues to be monitored Q1hr.

## 2024-01-20 VITALS
OXYGEN SATURATION: 97 % | RESPIRATION RATE: 18 BRPM | WEIGHT: 206 LBS | TEMPERATURE: 98 F | HEIGHT: 73 IN | HEART RATE: 77 BPM | SYSTOLIC BLOOD PRESSURE: 130 MMHG | BODY MASS INDEX: 27.3 KG/M2 | DIASTOLIC BLOOD PRESSURE: 72 MMHG

## 2024-01-20 LAB
ALBUMIN SERPL BCP-MCNC: 3.7 G/DL (ref 3.5–5.2)
ALP SERPL-CCNC: 63 U/L (ref 55–135)
ALT SERPL W/O P-5'-P-CCNC: 17 U/L (ref 10–44)
ANION GAP SERPL CALC-SCNC: 13 MMOL/L (ref 8–16)
AST SERPL-CCNC: 20 U/L (ref 10–40)
BASOPHILS # BLD AUTO: 0.05 K/UL (ref 0–0.2)
BASOPHILS NFR BLD: 0.5 % (ref 0–1.9)
BILIRUB SERPL-MCNC: 0.6 MG/DL (ref 0.1–1)
BUN SERPL-MCNC: 38 MG/DL (ref 6–20)
CALCIUM SERPL-MCNC: 8.7 MG/DL (ref 8.7–10.5)
CHLORIDE SERPL-SCNC: 97 MMOL/L (ref 95–110)
CO2 SERPL-SCNC: 27 MMOL/L (ref 23–29)
CREAT SERPL-MCNC: 2.8 MG/DL (ref 0.5–1.4)
DIFFERENTIAL METHOD BLD: ABNORMAL
EOSINOPHIL # BLD AUTO: 0.1 K/UL (ref 0–0.5)
EOSINOPHIL NFR BLD: 0.7 % (ref 0–8)
ERYTHROCYTE [DISTWIDTH] IN BLOOD BY AUTOMATED COUNT: 14 % (ref 11.5–14.5)
EST. GFR  (NO RACE VARIABLE): 25 ML/MIN/1.73 M^2
GLUCOSE SERPL-MCNC: 122 MG/DL (ref 70–110)
HCT VFR BLD AUTO: 32.6 % (ref 40–54)
HGB BLD-MCNC: 10.6 G/DL (ref 14–18)
IMM GRANULOCYTES # BLD AUTO: 0.07 K/UL (ref 0–0.04)
IMM GRANULOCYTES NFR BLD AUTO: 0.7 % (ref 0–0.5)
LYMPHOCYTES # BLD AUTO: 2.1 K/UL (ref 1–4.8)
LYMPHOCYTES NFR BLD: 19.5 % (ref 18–48)
MCH RBC QN AUTO: 27.4 PG (ref 27–31)
MCHC RBC AUTO-ENTMCNC: 32.5 G/DL (ref 32–36)
MCV RBC AUTO: 84 FL (ref 82–98)
MONOCYTES # BLD AUTO: 1.2 K/UL (ref 0.3–1)
MONOCYTES NFR BLD: 11 % (ref 4–15)
NEUTROPHILS # BLD AUTO: 7.3 K/UL (ref 1.8–7.7)
NEUTROPHILS NFR BLD: 67.6 % (ref 38–73)
NRBC BLD-RTO: 0 /100 WBC
PLATELET # BLD AUTO: 371 K/UL (ref 150–450)
PMV BLD AUTO: 9.7 FL (ref 9.2–12.9)
POCT GLUCOSE: 107 MG/DL (ref 70–110)
POCT GLUCOSE: 123 MG/DL (ref 70–110)
POTASSIUM SERPL-SCNC: 3.8 MMOL/L (ref 3.5–5.1)
PROT SERPL-MCNC: 6.9 G/DL (ref 6–8.4)
RBC # BLD AUTO: 3.87 M/UL (ref 4.6–6.2)
SODIUM SERPL-SCNC: 137 MMOL/L (ref 136–145)
WBC # BLD AUTO: 10.75 K/UL (ref 3.9–12.7)

## 2024-01-20 PROCEDURE — 90677 PCV20 VACCINE IM: CPT | Performed by: STUDENT IN AN ORGANIZED HEALTH CARE EDUCATION/TRAINING PROGRAM

## 2024-01-20 PROCEDURE — 25000003 PHARM REV CODE 250: Performed by: HOSPITALIST

## 2024-01-20 PROCEDURE — 80053 COMPREHEN METABOLIC PANEL: CPT | Performed by: HOSPITALIST

## 2024-01-20 PROCEDURE — 36415 COLL VENOUS BLD VENIPUNCTURE: CPT | Performed by: HOSPITALIST

## 2024-01-20 PROCEDURE — 90472 IMMUNIZATION ADMIN EACH ADD: CPT | Performed by: STUDENT IN AN ORGANIZED HEALTH CARE EDUCATION/TRAINING PROGRAM

## 2024-01-20 PROCEDURE — 85025 COMPLETE CBC W/AUTO DIFF WBC: CPT | Performed by: HOSPITALIST

## 2024-01-20 PROCEDURE — 90471 IMMUNIZATION ADMIN: CPT | Performed by: STUDENT IN AN ORGANIZED HEALTH CARE EDUCATION/TRAINING PROGRAM

## 2024-01-20 PROCEDURE — 63600175 PHARM REV CODE 636 W HCPCS: Performed by: STUDENT IN AN ORGANIZED HEALTH CARE EDUCATION/TRAINING PROGRAM

## 2024-01-20 PROCEDURE — 3E0234Z INTRODUCTION OF SERUM, TOXOID AND VACCINE INTO MUSCLE, PERCUTANEOUS APPROACH: ICD-10-PCS | Performed by: STUDENT IN AN ORGANIZED HEALTH CARE EDUCATION/TRAINING PROGRAM

## 2024-01-20 PROCEDURE — 90686 IIV4 VACC NO PRSV 0.5 ML IM: CPT | Performed by: STUDENT IN AN ORGANIZED HEALTH CARE EDUCATION/TRAINING PROGRAM

## 2024-01-20 PROCEDURE — 3E02340 INTRODUCTION OF INFLUENZA VACCINE INTO MUSCLE, PERCUTANEOUS APPROACH: ICD-10-PCS | Performed by: STUDENT IN AN ORGANIZED HEALTH CARE EDUCATION/TRAINING PROGRAM

## 2024-01-20 RX ADMIN — INFLUENZA VIRUS VACCINE 0.5 ML: 15; 15; 15; 15 SUSPENSION INTRAMUSCULAR at 11:01

## 2024-01-20 RX ADMIN — TAMSULOSIN HYDROCHLORIDE 0.4 MG: 0.4 CAPSULE ORAL at 08:01

## 2024-01-20 RX ADMIN — PNEUMOCOCCAL 20-VALENT CONJUGATE VACCINE 0.5 ML
2.2; 2.2; 2.2; 2.2; 2.2; 2.2; 2.2; 2.2; 2.2; 2.2; 2.2; 2.2; 2.2; 2.2; 2.2; 2.2; 4.4; 2.2; 2.2; 2.2 INJECTION, SUSPENSION INTRAMUSCULAR at 11:01

## 2024-01-20 RX ADMIN — POLYETHYLENE GLYCOL 3350 17 G: 17 POWDER, FOR SOLUTION ORAL at 08:01

## 2024-01-20 RX ADMIN — AMLODIPINE BESYLATE 10 MG: 5 TABLET ORAL at 08:01

## 2024-01-20 NOTE — PLAN OF CARE
Problem: Adult Inpatient Plan of Care  Goal: Plan of Care Review  Outcome: Adequate for Care Transition  Goal: Patient-Specific Goal (Individualized)  Outcome: Adequate for Care Transition  Goal: Absence of Hospital-Acquired Illness or Injury  Outcome: Adequate for Care Transition  Goal: Optimal Comfort and Wellbeing  Outcome: Adequate for Care Transition  Goal: Readiness for Transition of Care  Outcome: Adequate for Care Transition     Problem: Infection  Goal: Absence of Infection Signs and Symptoms  Outcome: Adequate for Care Transition     Problem: Diabetes Comorbidity  Goal: Blood Glucose Level Within Targeted Range  Outcome: Adequate for Care Transition     Problem: Skin Injury Risk Increased  Goal: Skin Health and Integrity  Outcome: Adequate for Care Transition     Problem: Fluid and Electrolyte Imbalance (Acute Kidney Injury/Impairment)  Goal: Fluid and Electrolyte Balance  Outcome: Adequate for Care Transition     Problem: Oral Intake Inadequate (Acute Kidney Injury/Impairment)  Goal: Optimal Nutrition Intake  Outcome: Adequate for Care Transition     Problem: Renal Function Impairment (Acute Kidney Injury/Impairment)  Goal: Effective Renal Function  Outcome: Adequate for Care Transition

## 2024-01-20 NOTE — PLAN OF CARE
Problem: Adult Inpatient Plan of Care  Goal: Plan of Care Review  1/20/2024 1302 by Gabby Manzo RN  Outcome: Adequate for Care Transition  1/20/2024 1302 by Gabby Manzo RN  Outcome: Adequate for Care Transition  Goal: Patient-Specific Goal (Individualized)  1/20/2024 1302 by Gabby Manzo RN  Outcome: Adequate for Care Transition  1/20/2024 1302 by Gabby Manzo RN  Outcome: Adequate for Care Transition  Goal: Absence of Hospital-Acquired Illness or Injury  1/20/2024 1302 by Gabby Manzo RN  Outcome: Adequate for Care Transition  1/20/2024 1302 by Gabby Manzo RN  Outcome: Adequate for Care Transition  Goal: Optimal Comfort and Wellbeing  1/20/2024 1302 by Gabby Manzo RN  Outcome: Adequate for Care Transition  1/20/2024 1302 by Gabby Manzo RN  Outcome: Adequate for Care Transition  Goal: Readiness for Transition of Care  1/20/2024 1302 by Gabby Manzo RN  Outcome: Adequate for Care Transition  1/20/2024 1302 by Gabby Manzo RN  Outcome: Adequate for Care Transition     Problem: Infection  Goal: Absence of Infection Signs and Symptoms  1/20/2024 1302 by Gabby Manzo RN  Outcome: Adequate for Care Transition  1/20/2024 1302 by Gabby Manzo RN  Outcome: Adequate for Care Transition     Problem: Diabetes Comorbidity  Goal: Blood Glucose Level Within Targeted Range  1/20/2024 1302 by Gabby Manzo RN  Outcome: Adequate for Care Transition  1/20/2024 1302 by Gabby Manzo RN  Outcome: Adequate for Care Transition     Problem: Skin Injury Risk Increased  Goal: Skin Health and Integrity  1/20/2024 1302 by Gabby Manzo, JENNIFER  Outcome: Adequate for Care Transition  1/20/2024 1302 by Gabby Manzo RN  Outcome: Adequate for Care Transition     Problem: Fluid and Electrolyte Imbalance (Acute Kidney Injury/Impairment)  Goal: Fluid and Electrolyte Balance  1/20/2024 1302 by Gabby Manzo, JENNIFER  Outcome: Adequate for Care  Transition  1/20/2024 1302 by Gabby Manzo, RN  Outcome: Adequate for Care Transition     Problem: Oral Intake Inadequate (Acute Kidney Injury/Impairment)  Goal: Optimal Nutrition Intake  1/20/2024 1302 by Gabby Manzo, RN  Outcome: Adequate for Care Transition  1/20/2024 1302 by Gabby Manzo, RN  Outcome: Adequate for Care Transition     Problem: Renal Function Impairment (Acute Kidney Injury/Impairment)  Goal: Effective Renal Function  1/20/2024 1302 by Gabby Manzo, RN  Outcome: Adequate for Care Transition  1/20/2024 1302 by Gabby Manzo, RN  Outcome: Adequate for Care Transition

## 2024-01-20 NOTE — PLAN OF CARE
Today's Date: 01/20/2024   Patient Name: Ramiro Ca   YOB: 1965         Hospital Medicine Dept.    [facility] [address] [place] [address]  Phone: [phone]    [facility] [address] [place] [address] [phone] after hours  [phone] fax Ramiro Ca has been hospitalized at the [facility], admitted on 1/17/2024 and discharged on 01/20/2024. Patient may return on Monday 1/22/24 without restrictions.     Kindly excuse the patient from work duties during their hospital stay & recovery.       Please contact us, if you have any questions about the length of stay or recovery time; however, details of the patient's medical history will remain confidential.            ________________________  Panchito Delatorre MD  Date Signed: 1/20/2024

## 2024-01-20 NOTE — NURSING
[facility] [place]  Nurses Note -- 4 Eyes      1/20/2024       Skin assessed on: Q Shift      [x] No Pressure Injuries Present    []Prevention Measures Documented    [] Yes LDA  for Pressure Injury Previously documented     [] Yes New Pressure Injury Discovered   [] LDA for New Pressure Injury Added      Attending RN:  Kellen Wynne RN     Second: DARA Hathaway

## 2024-01-20 NOTE — DISCHARGE SUMMARY
[facility] Medicine  Discharge Summary      Patient Name: Ramiro Ca  MRN: [id number]  [facility]: [id number]  Patient Class: IP- Inpatient  Admission Date: 1/17/2024  Hospital Length of Stay: 2 days  Discharge Date and Time:  01/20/2024 8:20 AM  Attending Physician: Panchito Delatorre MD   Discharging Provider: Panchito Delatorre MD  Primary Care Provider: [facility]    Primary Care Team: Networked reference to record PCT     HPI:   58 y.o. male with hypertension and DM2 presents with a complaint of hypoglycemia.  Was recently diagnosed with diabetes and prescribed glipizide 10 mg which he has been taking with breakfast each morning.  Reportedly began to feel clammy, lightheaded, and confused.  Home glucose meter with readings in the 20s and 30s in association with the symptoms.  He would eat candy and drink juice with improvement of symptoms, but they would recur.  Denies fever, chills, cough, SOB, chest pain, palpitations, syncope, nausea, vomiting, diarrhea.  In the ED, he was found to be hypoglycemic.  Labs also show mild leukocytosis with a normal UA, clear chest x-ray and benign abdominal exam.  Impaired renal function, creatinine/BUN 2.0/36 with unclear baseline.  He was given food, juice, and IV dextrose with temporary improvement of sugar but continued to drop.  Placed on a D10 infusion at a high rate of 200 mL/hour.  Persistent hypoglycemia despite these interventions.  Placed in observation to the ICU for close glucose monitoring.    * No surgery found *      Hospital Course:   58M with dm2 who presented due to persistent hypoglycemia after being started on glipizide 10 mg.  In the ED, he was found to be hypoglycemic.  Labs also show mild leukocytosis with a normal UA, clear chest x-ray and benign abdominal exam.  Impaired renal function, creatinine/BUN 2.0/36 with unclear baseline.  He was given food, juice, and IV dextrose with temporary improvement of sugar but continued to drop.   Placed on a D10 infusion at a high rate of 200 mL/hour.  Persistent hypoglycemia despite these interventions.  Placed in observation to the ICU for close glucose monitoring with q1h accuchecks. Remains on d10 with mild improvement in hypoglycemia. BG at home only  prior to starting glipizide per pt.     Worsening renal function. Losartan and HCTZ stopped. Had normal renal function 3 years ago, but had severe ARYA with Cr 6 on one read with only a 2.0 afterward. 2.0 maybe baseline, has increased to 2.8 here, likely cause of hypoglycemia. May not be able to be on Metformin outpt either, with Cr cut off at 1.5 and above, maybe glipizide alone but would hold both until stabilized. Will attempt to shut off dextrose gtt.      BP's have been 120-128 on Amlodipine alone, and glucoses have been within normal ranges not on oral meds or insulin.       Stop taking metformin and glipizide  Stop taking losartan and Hydrochlorathiazide- discuss with PCP and Nephrology before restarting  Continue Amlodipine   Follow up with PCP, Nephrology, and Endocrinology      Thank you for trusting [facility] and me with your care.  We are honored that you entrusted us with your healthcare needs. Your satisfaction is very important to us and we hope you have been very pleased with your experience at [facility]. After your discharge you may receive a survey asking you to rate your hospital experience. We encourage you to take the time to complete the survey as your feedback allows us to identify areas for improvement as well as recognize our staff for their care. We hope that you have received the very best care possible during your hospitalization at [facility], as your satisfaction is our top priority.    Let me know if there is anything more I can do!!        Panchito Delatorre MD  Board-Certified Internal Medicine Attending  Section Head of Hospital Medicine    Goals of Care Treatment Preferences:  Code Status:  Full Code      ROS:  General: Negative for fevers or chills.  Cardiac: Negative for chest pain or orthopnea   Pulmonary: Negative for dyspnea or wheezing.  GI: Negative for abdominal distention or pain     Vitals:    01/19/24 2010 01/19/24 2329 01/20/24 0441 01/20/24 0802   BP: 130/70 127/64 128/86 (!) 140/94   BP Location:       Patient Position: Lying Lying Lying    Pulse: 97 96 93 91   Resp: 18 18 18 18   Temp: 99 °F (37.2 °C) 99.1 °F (37.3 °C) 98.5 °F (36.9 °C) 98.7 °F (37.1 °C)   TempSrc: Oral Oral Oral Oral   SpO2: 95% 97% (!) 94% 95%   Weight:       Height:              Body mass index is 27.18 kg/m².      PHYSICAL EXAM:  GENERAL APPEARANCE: alert and cooperative, and appears to be in no acute distress.  HEENT:     HEAD: NC/AT     EYES: PERRL, EOMI.  Vision is grossly intact.  NECK: Neck supple, non-tender without LAD, masses or thyromegaly.  CARDIAC: There is no peripheral edema, cyanosis or pallor.   LUNGS: Clear to auscultation and percussion without rales or wheezing  ABDOMEN: Non-distended. No guarding.  MSK: No joint erythema or tenderness.   EXTREMITIES: No significant deformity or joint abnormality. No edema.   NEUROLOGICAL: CN II-XII grossly intact.   SKIN: Skin normal color, texture and turgor with no lesions or eruptions.  PSYCHIATRIC: Oriented. No tangential speech. No Hyperactive features.    Consults:     No new Assessment & Plan notes have been filed under this hospital service since the last note was generated.  Service: Hospital Medicine    Final Active Diagnoses:    Diagnosis Date Noted POA    PRINCIPAL PROBLEM:  Controlled type 2 diabetes mellitus with hypoglycemia, without long-term current use of insulin [E11.649] 01/17/2024 Yes    ARYA (acute kidney injury) [N17.9] 01/18/2024 Yes    Essential hypertension [I10] 04/29/2017 Yes     Chronic    GERD (gastroesophageal reflux disease) [K21.9] 05/09/2013 Yes      Problems Resolved During this Admission:       Discharged Condition:  good    Disposition: home    Follow Up:    Patient Instructions:      Ambulatory referral/consult to Internal Medicine   Standing Status: Future   Referral Priority: Routine Referral Type: Consultation   Referral Reason: Specialty Services Required   Requested Specialty: Internal Medicine   Number of Visits Requested: 1     Ambulatory referral/consult to Nephrology   Standing Status: Future   Referral Priority: Routine Referral Type: Consultation   Referral Reason: Specialty Services Required   Requested Specialty: Nephrology   Number of Visits Requested: 1     Ambulatory referral/consult to Endocrinology   Standing Status: Future   Referral Priority: Routine Referral Type: Consultation   Requested Specialty: Endocrinology   Number of Visits Requested: 1       Significant Diagnostic Studies:     Recent Results (from the past 100 hour(s))   POCT glucose    Collection Time: 01/17/24 10:37 AM   Result Value Ref Range    POCT Glucose 81 70 - 110 mg/dL   Urinalysis, Reflex to Urine Culture Urine, Clean Catch    Collection Time: 01/17/24 11:12 AM    Specimen: Urine   Result Value Ref Range    Specimen UA Urine, Clean Catch     Color, UA Yellow Yellow, Straw, Leena    Appearance, UA Clear Clear    pH, UA 7.0 5.0 - 8.0    Specific Gravity, UA 1.015 1.005 - 1.030    Protein, UA Negative Negative    Glucose, UA Negative Negative    Ketones, UA Negative Negative    Bilirubin (UA) Negative Negative    Occult Blood UA Negative Negative    Nitrite, UA Negative Negative    Urobilinogen, UA Negative <2.0 EU/dL    Leukocytes, UA Negative Negative   POCT glucose    Collection Time: 01/17/24 11:24 AM   Result Value Ref Range    POCT Glucose 38 (LL) 70 - 110 mg/dL   CBC auto differential    Collection Time: 01/17/24 11:32 AM   Result Value Ref Range    WBC 14.68 (H) 3.90 - 12.70 K/uL    RBC 4.02 (L) 4.60 - 6.20 M/uL    Hemoglobin 11.2 (L) 14.0 - 18.0 g/dL    Hematocrit 32.5 (L) 40.0 - 54.0 %    MCV 81 (L) 82 - 98 fL    MCH 27.9 27.0 -  31.0 pg    MCHC 34.5 32.0 - 36.0 g/dL    RDW 14.1 11.5 - 14.5 %    Platelets 358 150 - 450 K/uL    MPV 9.9 9.2 - 12.9 fL    Immature Granulocytes 0.8 (H) 0.0 - 0.5 %    Gran # (ANC) 12.7 (H) 1.8 - 7.7 K/uL    Immature Grans (Abs) 0.12 (H) 0.00 - 0.04 K/uL    Lymph # 0.9 (L) 1.0 - 4.8 K/uL    Mono # 0.9 0.3 - 1.0 K/uL    Eos # 0.0 0.0 - 0.5 K/uL    Baso # 0.02 0.00 - 0.20 K/uL    nRBC 0 0 /100 WBC    Gran % 86.7 (H) 38.0 - 73.0 %    Lymph % 6.0 (L) 18.0 - 48.0 %    Mono % 6.1 4.0 - 15.0 %    Eosinophil % 0.3 0.0 - 8.0 %    Basophil % 0.1 0.0 - 1.9 %    Differential Method Automated    Comprehensive metabolic panel    Collection Time: 01/17/24 11:32 AM   Result Value Ref Range    Sodium 139 136 - 145 mmol/L    Potassium 3.6 3.5 - 5.1 mmol/L    Chloride 100 95 - 110 mmol/L    CO2 29 23 - 29 mmol/L    Glucose 56 (L) 70 - 110 mg/dL    BUN 36 (H) 6 - 20 mg/dL    Creatinine 2.0 (H) 0.5 - 1.4 mg/dL    Calcium 8.8 8.7 - 10.5 mg/dL    Total Protein 7.5 6.0 - 8.4 g/dL    Albumin 3.9 3.5 - 5.2 g/dL    Total Bilirubin 0.4 0.1 - 1.0 mg/dL    Alkaline Phosphatase 57 55 - 135 U/L    AST 32 10 - 40 U/L    ALT 21 10 - 44 U/L    eGFR 38 (A) >60 mL/min/1.73 m^2    Anion Gap 10 8 - 16 mmol/L   Magnesium    Collection Time: 01/17/24 11:32 AM   Result Value Ref Range    Magnesium 1.2 (L) 1.6 - 2.6 mg/dL   POCT glucose    Collection Time: 01/17/24 11:57 AM   Result Value Ref Range    POCT Glucose 69 (L) 70 - 110 mg/dL   POCT glucose    Collection Time: 01/17/24 12:34 PM   Result Value Ref Range    POCT Glucose 111 (H) 70 - 110 mg/dL   POCT glucose    Collection Time: 01/17/24  1:11 PM   Result Value Ref Range    POCT Glucose 94 70 - 110 mg/dL   POCT glucose    Collection Time: 01/17/24  1:42 PM   Result Value Ref Range    POCT Glucose 59 (L) 70 - 110 mg/dL   Hemoglobin A1c    Collection Time: 01/17/24  2:09 PM   Result Value Ref Range    Hemoglobin A1C 6.0 (H) 4.0 - 5.6 %    Estimated Avg Glucose 126 68 - 131 mg/dL   POCT glucose     Collection Time: 01/17/24  2:11 PM   Result Value Ref Range    POCT Glucose 129 (H) 70 - 110 mg/dL   POCT glucose    Collection Time: 01/17/24  2:40 PM   Result Value Ref Range    POCT Glucose 100 70 - 110 mg/dL   POCT glucose    Collection Time: 01/17/24  3:13 PM   Result Value Ref Range    POCT Glucose 72 70 - 110 mg/dL   POCT glucose    Collection Time: 01/17/24  4:02 PM   Result Value Ref Range    POCT Glucose 63 (L) 70 - 110 mg/dL   POCT glucose    Collection Time: 01/17/24  4:32 PM   Result Value Ref Range    POCT Glucose 79 70 - 110 mg/dL   POCT glucose    Collection Time: 01/17/24  5:11 PM   Result Value Ref Range    POCT Glucose 51 (L) 70 - 110 mg/dL   POCT glucose    Collection Time: 01/17/24  5:45 PM   Result Value Ref Range    POCT Glucose 67 (L) 70 - 110 mg/dL   POCT glucose    Collection Time: 01/17/24  6:04 PM   Result Value Ref Range    POCT Glucose 67 (L) 70 - 110 mg/dL   POCT glucose    Collection Time: 01/17/24  6:23 PM   Result Value Ref Range    POCT Glucose 79 70 - 110 mg/dL   POCT glucose    Collection Time: 01/17/24  7:32 PM   Result Value Ref Range    POCT Glucose 46 (LL) 70 - 110 mg/dL   POCT glucose    Collection Time: 01/17/24  8:26 PM   Result Value Ref Range    POCT Glucose 73 70 - 110 mg/dL   POCT glucose    Collection Time: 01/17/24  9:32 PM   Result Value Ref Range    POCT Glucose 43 (LL) 70 - 110 mg/dL   POCT glucose    Collection Time: 01/17/24 10:34 PM   Result Value Ref Range    POCT Glucose 28 (LL) 70 - 110 mg/dL   POCT glucose    Collection Time: 01/17/24 11:30 PM   Result Value Ref Range    POCT Glucose 56 (L) 70 - 110 mg/dL   POCT glucose    Collection Time: 01/18/24 12:52 AM   Result Value Ref Range    POCT Glucose 32 (LL) 70 - 110 mg/dL   Glucose, random    Collection Time: 01/18/24  1:30 AM   Result Value Ref Range    Glucose 52 (L) 70 - 110 mg/dL   POCT glucose    Collection Time: 01/18/24  1:57 AM   Result Value Ref Range    POCT Glucose 47 (LL) 70 - 110 mg/dL   POCT  glucose    Collection Time: 01/18/24  2:51 AM   Result Value Ref Range    POCT Glucose 77 70 - 110 mg/dL   POCT glucose    Collection Time: 01/18/24  4:09 AM   Result Value Ref Range    POCT Glucose 59 (L) 70 - 110 mg/dL   POCT glucose    Collection Time: 01/18/24  5:02 AM   Result Value Ref Range    POCT Glucose 64 (L) 70 - 110 mg/dL   Comprehensive Metabolic Panel (CMP)    Collection Time: 01/18/24  5:14 AM   Result Value Ref Range    Sodium 130 (L) 136 - 145 mmol/L    Potassium 2.9 (L) 3.5 - 5.1 mmol/L    Chloride 92 (L) 95 - 110 mmol/L    CO2 26 23 - 29 mmol/L    Glucose 53 (L) 70 - 110 mg/dL    BUN 36 (H) 6 - 20 mg/dL    Creatinine 2.8 (H) 0.5 - 1.4 mg/dL    Calcium 7.9 (L) 8.7 - 10.5 mg/dL    Total Protein 6.2 6.0 - 8.4 g/dL    Albumin 3.3 (L) 3.5 - 5.2 g/dL    Total Bilirubin 0.5 0.1 - 1.0 mg/dL    Alkaline Phosphatase 51 (L) 55 - 135 U/L    AST 26 10 - 40 U/L    ALT 19 10 - 44 U/L    eGFR 25 (A) >60 mL/min/1.73 m^2    Anion Gap 12 8 - 16 mmol/L   CBC with Automated Differential    Collection Time: 01/18/24  5:14 AM   Result Value Ref Range    WBC 14.22 (H) 3.90 - 12.70 K/uL    RBC 3.80 (L) 4.60 - 6.20 M/uL    Hemoglobin 10.7 (L) 14.0 - 18.0 g/dL    Hematocrit 31.5 (L) 40.0 - 54.0 %    MCV 83 82 - 98 fL    MCH 28.2 27.0 - 31.0 pg    MCHC 34.0 32.0 - 36.0 g/dL    RDW 14.1 11.5 - 14.5 %    Platelets 356 150 - 450 K/uL    MPV 10.0 9.2 - 12.9 fL    Immature Granulocytes 0.6 (H) 0.0 - 0.5 %    Gran # (ANC) 10.6 (H) 1.8 - 7.7 K/uL    Immature Grans (Abs) 0.08 (H) 0.00 - 0.04 K/uL    Lymph # 2.2 1.0 - 4.8 K/uL    Mono # 1.3 (H) 0.3 - 1.0 K/uL    Eos # 0.1 0.0 - 0.5 K/uL    Baso # 0.03 0.00 - 0.20 K/uL    nRBC 0 0 /100 WBC    Gran % 74.6 (H) 38.0 - 73.0 %    Lymph % 15.1 (L) 18.0 - 48.0 %    Mono % 9.1 4.0 - 15.0 %    Eosinophil % 0.4 0.0 - 8.0 %    Basophil % 0.2 0.0 - 1.9 %    Differential Method Automated    POCT glucose    Collection Time: 01/18/24  6:05 AM   Result Value Ref Range    POCT Glucose 66 (L) 70 - 110  mg/dL   POCT glucose    Collection Time: 01/18/24  7:16 AM   Result Value Ref Range    POCT Glucose 85 70 - 110 mg/dL   POCT glucose    Collection Time: 01/18/24  8:05 AM   Result Value Ref Range    POCT Glucose 73 70 - 110 mg/dL   POCT glucose    Collection Time: 01/18/24  8:55 AM   Result Value Ref Range    POCT Glucose 83 70 - 110 mg/dL   POCT glucose    Collection Time: 01/18/24  9:51 AM   Result Value Ref Range    POCT Glucose 71 70 - 110 mg/dL   POCT glucose    Collection Time: 01/18/24 10:22 AM   Result Value Ref Range    POCT Glucose 63 (L) 70 - 110 mg/dL   POCT glucose    Collection Time: 01/18/24 11:10 AM   Result Value Ref Range    POCT Glucose 84 70 - 110 mg/dL   POCT glucose    Collection Time: 01/18/24 12:09 PM   Result Value Ref Range    POCT Glucose 96 70 - 110 mg/dL   Potassium    Collection Time: 01/18/24 12:10 PM   Result Value Ref Range    Potassium 3.0 (L) 3.5 - 5.1 mmol/L   POCT glucose    Collection Time: 01/18/24  1:11 PM   Result Value Ref Range    POCT Glucose 103 70 - 110 mg/dL   POCT glucose    Collection Time: 01/18/24  2:07 PM   Result Value Ref Range    POCT Glucose 97 70 - 110 mg/dL   POCT glucose    Collection Time: 01/18/24  3:10 PM   Result Value Ref Range    POCT Glucose 109 70 - 110 mg/dL   POCT glucose    Collection Time: 01/18/24  4:01 PM   Result Value Ref Range    POCT Glucose 127 (H) 70 - 110 mg/dL   POCT glucose    Collection Time: 01/18/24  5:08 PM   Result Value Ref Range    POCT Glucose 149 (H) 70 - 110 mg/dL   POCT glucose    Collection Time: 01/18/24  6:09 PM   Result Value Ref Range    POCT Glucose 143 (H) 70 - 110 mg/dL   POCT glucose    Collection Time: 01/18/24  7:29 PM   Result Value Ref Range    POCT Glucose 132 (H) 70 - 110 mg/dL   POCT glucose    Collection Time: 01/18/24  8:32 PM   Result Value Ref Range    POCT Glucose 138 (H) 70 - 110 mg/dL   POCT glucose    Collection Time: 01/18/24  9:31 PM   Result Value Ref Range    POCT Glucose 140 (H) 70 - 110 mg/dL    POCT glucose    Collection Time: 01/18/24 10:37 PM   Result Value Ref Range    POCT Glucose 138 (H) 70 - 110 mg/dL   POCT glucose    Collection Time: 01/18/24 11:26 PM   Result Value Ref Range    POCT Glucose 148 (H) 70 - 110 mg/dL   POCT glucose    Collection Time: 01/19/24 12:43 AM   Result Value Ref Range    POCT Glucose 137 (H) 70 - 110 mg/dL   POCT glucose    Collection Time: 01/19/24  1:39 AM   Result Value Ref Range    POCT Glucose 154 (H) 70 - 110 mg/dL   POCT glucose    Collection Time: 01/19/24  3:01 AM   Result Value Ref Range    POCT Glucose 146 (H) 70 - 110 mg/dL   Comprehensive Metabolic Panel (CMP)    Collection Time: 01/19/24  3:38 AM   Result Value Ref Range    Sodium 128 (L) 136 - 145 mmol/L    Potassium 3.0 (L) 3.5 - 5.1 mmol/L    Chloride 88 (L) 95 - 110 mmol/L    CO2 30 (H) 23 - 29 mmol/L    Glucose 136 (H) 70 - 110 mg/dL    BUN 30 (H) 6 - 20 mg/dL    Creatinine 2.8 (H) 0.5 - 1.4 mg/dL    Calcium 8.2 (L) 8.7 - 10.5 mg/dL    Total Protein 6.1 6.0 - 8.4 g/dL    Albumin 3.4 (L) 3.5 - 5.2 g/dL    Total Bilirubin 0.6 0.1 - 1.0 mg/dL    Alkaline Phosphatase 59 55 - 135 U/L    AST 24 10 - 40 U/L    ALT 15 10 - 44 U/L    eGFR 25 (A) >60 mL/min/1.73 m^2    Anion Gap 10 8 - 16 mmol/L   CBC with Automated Differential    Collection Time: 01/19/24  3:38 AM   Result Value Ref Range    WBC 12.54 3.90 - 12.70 K/uL    RBC 3.62 (L) 4.60 - 6.20 M/uL    Hemoglobin 9.9 (L) 14.0 - 18.0 g/dL    Hematocrit 29.7 (L) 40.0 - 54.0 %    MCV 82 82 - 98 fL    MCH 27.3 27.0 - 31.0 pg    MCHC 33.3 32.0 - 36.0 g/dL    RDW 13.5 11.5 - 14.5 %    Platelets 335 150 - 450 K/uL    MPV 9.6 9.2 - 12.9 fL    Immature Granulocytes 0.7 (H) 0.0 - 0.5 %    Gran # (ANC) 9.4 (H) 1.8 - 7.7 K/uL    Immature Grans (Abs) 0.09 (H) 0.00 - 0.04 K/uL    Lymph # 1.6 1.0 - 4.8 K/uL    Mono # 1.4 (H) 0.3 - 1.0 K/uL    Eos # 0.1 0.0 - 0.5 K/uL    Baso # 0.03 0.00 - 0.20 K/uL    nRBC 0 0 /100 WBC    Gran % 75.0 (H) 38.0 - 73.0 %    Lymph % 12.9 (L) 18.0  - 48.0 %    Mono % 10.8 4.0 - 15.0 %    Eosinophil % 0.4 0.0 - 8.0 %    Basophil % 0.2 0.0 - 1.9 %    Differential Method Automated    Magnesium    Collection Time: 01/19/24  3:38 AM   Result Value Ref Range    Magnesium 1.7 1.6 - 2.6 mg/dL   Phosphorus    Collection Time: 01/19/24  3:38 AM   Result Value Ref Range    Phosphorus 2.4 (L) 2.7 - 4.5 mg/dL   POCT glucose    Collection Time: 01/19/24  4:09 AM   Result Value Ref Range    POCT Glucose 147 (H) 70 - 110 mg/dL   POCT glucose    Collection Time: 01/19/24  5:14 AM   Result Value Ref Range    POCT Glucose 144 (H) 70 - 110 mg/dL   POCT glucose    Collection Time: 01/19/24  6:13 AM   Result Value Ref Range    POCT Glucose 133 (H) 70 - 110 mg/dL   POCT glucose    Collection Time: 01/19/24  7:20 AM   Result Value Ref Range    POCT Glucose 133 (H) 70 - 110 mg/dL   Potassium    Collection Time: 01/19/24 10:18 AM   Result Value Ref Range    Potassium 3.5 3.5 - 5.1 mmol/L   POCT glucose    Collection Time: 01/19/24 11:31 AM   Result Value Ref Range    POCT Glucose 141 (H) 70 - 110 mg/dL   POCT glucose    Collection Time: 01/19/24  4:24 PM   Result Value Ref Range    POCT Glucose 141 (H) 70 - 110 mg/dL   POCT glucose    Collection Time: 01/19/24  8:08 PM   Result Value Ref Range    POCT Glucose 137 (H) 70 - 110 mg/dL   POCT glucose    Collection Time: 01/20/24  8:01 AM   Result Value Ref Range    POCT Glucose 123 (H) 70 - 110 mg/dL       Microbiology Results (last 7 days)       ** No results found for the last 168 hours. **            Imaging Results              X-Ray Chest AP Portable (Final result)  Result time 01/17/24 12:26:55      Final result by Jeromy Coombs MD (01/17/24 12:26:55)                   Impression:      See above      Electronically signed by: Jeromy Coombs MD  Date:    01/17/2024  Time:    12:26               Narrative:    EXAMINATION:  XR CHEST AP PORTABLE    CLINICAL HISTORY:  hypoglycemia & leukocytosis, no sob;    TECHNIQUE:  Single  frontal view of the chest was performed.    COMPARISON:  2024    FINDINGS:  Cardiac size is normal.  Lungs are clear and well aerated with no infiltrate.  Gastric air bubble is distended.                                          Pending Diagnostic Studies:       Procedure Component Value Units Date/Time    CBC with Automated Differential [[id number]]     Order Status: Sent Lab Status: No result     Specimen: Blood     Comprehensive Metabolic Panel (CMP) [[id number]]     Order Status: Sent Lab Status: No result     Specimen: Blood            Medications:  Reconciled Home Medications:      Medication List        CONTINUE taking these medications      albuterol 90 mcg/actuation inhaler  Commonly known as: PROVENTIL/VENTOLIN HFA  SMARTSI-2 Puff(s) By Mouth 3 Times Daily PRN     amLODIPine 10 MG tablet  Commonly known as: NORVASC  Take 1 tablet (10 mg total) by mouth once daily.     fluticasone propionate 50 mcg/actuation nasal spray  Commonly known as: FLONASE  SMARTSI-2 Spray(s) Both Nares Daily     hydrOXYzine HCL 25 MG tablet  Commonly known as: ATARAX  Take 25 mg by mouth every evening.     promethazine-dextromethorphan 6.25-15 mg/5 mL Syrp  Commonly known as: PROMETHAZINE-DM  Take 5-10 mLs by mouth every 4 (four) hours as needed.     tamsulosin 0.4 mg Cap  Commonly known as: FLOMAX  Take 0.4 mg by mouth once daily.     TRUE METRIX GLUCOSE METER Misc  Generic drug: blood-glucose meter  USE TO CHECK BLOOD SUGAR DAILY AT HOME     TRUE METRIX GLUCOSE TEST STRIP Strp  Generic drug: blood sugar diagnostic  SMARTSIG:Via Meter Daily     ULTRA THIN LANCETS 30 gauge Misc  Generic drug: lancets  USE TO CHECK BLOOD GLUCOSE EVERY DAY            STOP taking these medications      glipiZIDE 10 MG Tr24  Commonly known as: GLUCOTROL     losartan-hydrochlorothiazide 50-12.5 mg 50-12.5 mg per tablet  Commonly known as: HYZAAR              Indwelling Lines/Drains at time of discharge:   Lines/Drains/Airways       None                    Time spent on the discharge of patient: 35 minutes         Panchito Delatorre MD  Department of Hospital Medicine  [facility]

## 2024-01-20 NOTE — DISCHARGE INSTRUCTIONS
Stop taking metformin and glipizide  Stop taking losartan and Hydrochlorathiazide- discuss with PCP and Nephrology before restarting  Continue Amlodipine   Follow up with PCP, Nephrology, and Endocrinology      Thank you for trusting [facility] and me with your care.  We are honored that you entrusted us with your healthcare needs. Your satisfaction is very important to us and we hope you have been very pleased with your experience at [facility]. After your discharge you may receive a survey asking you to rate your hospital experience. We encourage you to take the time to complete the survey as your feedback allows us to identify areas for improvement as well as recognize our staff for their care. We hope that you have received the very best care possible during your hospitalization at [facility], as your satisfaction is our top priority.    Let me know if there is anything more I can do!!        Panchito Delatorre MD  Board-Certified Internal Medicine Attending  Section Head of Hospital Medicine      PATIENT GENERAL DISCHARGE INFORMATION   Things that YOU are responsible for to Manage Your Care At Home:  1. Getting your prescriptions filled.  2. Taking you medications as directed. (DO NOT MISS ANY DOSES!)  3. Going to your follow-up doctor appointments.                 *This is important because it allows the doctor to monitor your progress and make changes.      If you are unable to make your follow up appointments, please call the number listed and reschedule this appointment.   After discharge, if you need assistance, you can call [facility] for 24/7 assistance at [phone]  If you are experience any signs or symptoms, Call your doctor or Call 911 and come to your nearest Emergency Room.    You should receive a call from [facility] within 48-72 hours to help manage your care after discharge.   Please try to make sure that you answer your phone  for this important phone call.

## 2024-01-20 NOTE — PLAN OF CARE
Case Management Final Discharge Note      Discharge Disposition: Home    New DME ordered / company name: n/a    Relevant SDOH / Transition of Care Barriers:  n/a    Primary Caretaker and contact information: Help at home:   Paz CARTER     Scheduled followup appointment: n/a weekend DC    Referrals placed: per MD    Transportation: self    Bedside RNKellen notified, patient clear to discharge from Case Management Perspective.       01/20/24 0929   Final Note   Assessment Type Final Discharge Note   Anticipated Discharge Disposition Home   Hospital Resources/Appts/Education Provided   (Unable to schedule follow up appointment due to weekend DC)   Post-Acute Status   Post-Acute Authorization Other   Other Status No Post-Acute Service Needs   Discharge Delays None known at this time

## 2024-01-20 NOTE — PLAN OF CARE
Problem: Adult Inpatient Plan of Care  Goal: Plan of Care Review  Outcome: Ongoing, Progressing  Flowsheets (Taken 1/20/2024 0815)  Plan of Care Reviewed With: patient  Goal: Patient-Specific Goal (Individualized)  Outcome: Ongoing, Progressing     Problem: Diabetes Comorbidity  Goal: Blood Glucose Level Within Targeted Range  Outcome: Ongoing, Progressing  Intervention: Monitor and Manage Glycemia  Flowsheets (Taken 1/20/2024 0815)  Glycemic Management:   blood glucose monitored   carbohydrate replacement provided   oral hydration promoted   supplemental insulin given     Problem: Adult Inpatient Plan of Care  Goal: Plan of Care Review  Outcome: Ongoing, Progressing  Flowsheets (Taken 1/20/2024 0815)  Plan of Care Reviewed With: patient     Problem: Adult Inpatient Plan of Care  Goal: Plan of Care Review  Outcome: Ongoing, Progressing  Flowsheets (Taken 1/20/2024 0815)  Plan of Care Reviewed With: patient     Problem: Adult Inpatient Plan of Care  Goal: Patient-Specific Goal (Individualized)  Outcome: Ongoing, Progressing     Problem: Adult Inpatient Plan of Care  Goal: Patient-Specific Goal (Individualized)  Outcome: Ongoing, Progressing     Problem: Diabetes Comorbidity  Goal: Blood Glucose Level Within Targeted Range  Outcome: Ongoing, Progressing  Intervention: Monitor and Manage Glycemia  Flowsheets (Taken 1/20/2024 0815)  Glycemic Management:   blood glucose monitored   carbohydrate replacement provided   oral hydration promoted   supplemental insulin given     Problem: Diabetes Comorbidity  Goal: Blood Glucose Level Within Targeted Range  Outcome: Ongoing, Progressing     Problem: Diabetes Comorbidity  Goal: Blood Glucose Level Within Targeted Range  Intervention: Monitor and Manage Glycemia  Flowsheets (Taken 1/20/2024 0815)  Glycemic Management:   blood glucose monitored   carbohydrate replacement provided   oral hydration promoted   supplemental insulin given     Problem: Diabetes Comorbidity  Goal:  Blood Glucose Level Within Targeted Range  Intervention: Monitor and Manage Glycemia  Flowsheets (Taken 1/20/2024 0815)  Glycemic Management:   blood glucose monitored   carbohydrate replacement provided   oral hydration promoted   supplemental insulin given

## 2024-03-21 DIAGNOSIS — N17.9 AKI (ACUTE KIDNEY INJURY): Primary | ICD-10-CM

## 2024-04-12 ENCOUNTER — LAB VISIT (OUTPATIENT)
Dept: LAB | Facility: HOSPITAL | Age: 59
End: 2024-04-12
Attending: INTERNAL MEDICINE
Payer: MEDICAID

## 2024-04-12 DIAGNOSIS — N17.9 AKI (ACUTE KIDNEY INJURY): ICD-10-CM

## 2024-04-12 LAB
ALBUMIN SERPL BCP-MCNC: 4.1 G/DL (ref 3.5–5.2)
ANION GAP SERPL CALC-SCNC: 2 MMOL/L (ref 8–16)
BASOPHILS # BLD AUTO: 0.06 K/UL (ref 0–0.2)
BASOPHILS NFR BLD: 0.9 % (ref 0–1.9)
BUN SERPL-MCNC: 29 MG/DL (ref 6–20)
CALCIUM SERPL-MCNC: 9.5 MG/DL (ref 8.7–10.5)
CHLORIDE SERPL-SCNC: 108 MMOL/L (ref 95–110)
CO2 SERPL-SCNC: 28 MMOL/L (ref 23–29)
CREAT SERPL-MCNC: 2.4 MG/DL (ref 0.5–1.4)
DIFFERENTIAL METHOD BLD: ABNORMAL
EOSINOPHIL # BLD AUTO: 0.1 K/UL (ref 0–0.5)
EOSINOPHIL NFR BLD: 1.9 % (ref 0–8)
ERYTHROCYTE [DISTWIDTH] IN BLOOD BY AUTOMATED COUNT: 13.2 % (ref 11.5–14.5)
EST. GFR  (NO RACE VARIABLE): 30 ML/MIN/1.73 M^2
GLUCOSE SERPL-MCNC: 97 MG/DL (ref 70–110)
HCT VFR BLD AUTO: 33.4 % (ref 40–54)
HGB BLD-MCNC: 10.4 G/DL (ref 14–18)
IMM GRANULOCYTES # BLD AUTO: 0.01 K/UL (ref 0–0.04)
IMM GRANULOCYTES NFR BLD AUTO: 0.1 % (ref 0–0.5)
LYMPHOCYTES # BLD AUTO: 2.1 K/UL (ref 1–4.8)
LYMPHOCYTES NFR BLD: 31.4 % (ref 18–48)
MCH RBC QN AUTO: 27.5 PG (ref 27–31)
MCHC RBC AUTO-ENTMCNC: 31.1 G/DL (ref 32–36)
MCV RBC AUTO: 88 FL (ref 82–98)
MONOCYTES # BLD AUTO: 0.6 K/UL (ref 0.3–1)
MONOCYTES NFR BLD: 8.8 % (ref 4–15)
NEUTROPHILS # BLD AUTO: 3.8 K/UL (ref 1.8–7.7)
NEUTROPHILS NFR BLD: 56.9 % (ref 38–73)
NRBC BLD-RTO: 0 /100 WBC
PHOSPHATE SERPL-MCNC: 3.4 MG/DL (ref 2.7–4.5)
PLATELET # BLD AUTO: 347 K/UL (ref 150–450)
PMV BLD AUTO: 11 FL (ref 9.2–12.9)
POTASSIUM SERPL-SCNC: 3.4 MMOL/L (ref 3.5–5.1)
PTH-INTACT SERPL-MCNC: 90.4 PG/ML (ref 9–77)
RBC # BLD AUTO: 3.78 M/UL (ref 4.6–6.2)
SODIUM SERPL-SCNC: 138 MMOL/L (ref 136–145)
URATE SERPL-MCNC: 5.4 MG/DL (ref 3.4–7)
WBC # BLD AUTO: 6.68 K/UL (ref 3.9–12.7)

## 2024-04-12 PROCEDURE — 80069 RENAL FUNCTION PANEL: CPT | Performed by: INTERNAL MEDICINE

## 2024-04-12 PROCEDURE — 85025 COMPLETE CBC W/AUTO DIFF WBC: CPT | Performed by: INTERNAL MEDICINE

## 2024-04-12 PROCEDURE — 84550 ASSAY OF BLOOD/URIC ACID: CPT | Performed by: INTERNAL MEDICINE

## 2024-04-12 PROCEDURE — 83970 ASSAY OF PARATHORMONE: CPT | Performed by: INTERNAL MEDICINE

## 2024-04-12 PROCEDURE — 36415 COLL VENOUS BLD VENIPUNCTURE: CPT | Performed by: INTERNAL MEDICINE

## 2024-04-14 PROBLEM — N25.81 SECONDARY RENAL HYPERPARATHYROIDISM: Status: ACTIVE | Noted: 2024-04-14

## 2024-04-14 PROBLEM — N18.4 CHRONIC KIDNEY DISEASE, STAGE IV (SEVERE): Status: ACTIVE | Noted: 2024-04-14

## 2024-04-14 PROBLEM — N18.4 ANEMIA, CHRONIC RENAL FAILURE, STAGE 4 (SEVERE): Status: ACTIVE | Noted: 2024-04-14

## 2024-04-14 PROBLEM — E87.6 HYPOKALEMIA: Status: ACTIVE | Noted: 2024-04-14

## 2024-04-14 PROBLEM — D63.1 ANEMIA, CHRONIC RENAL FAILURE, STAGE 4 (SEVERE): Status: ACTIVE | Noted: 2024-04-14

## 2024-04-14 PROBLEM — N13.30 HYDRONEPHROSIS: Status: ACTIVE | Noted: 2024-04-14

## 2024-04-14 NOTE — PROGRESS NOTES
[facility]  [address] [facility], [address]  [place]  [phone]    Referring Provider: Panchito Delatorre MD  PCP: [facility]  Oncologist: Raheem  Urologist: Theodora      HPI: Ramiro Ca is a 59 y.o. male with HTN, T2DM, and metastatic prostate cancer who is here for new patient evaluation of Chronic Kidney Disease  Prior records obtained and reviewed. His Cr was 1.0-1.2 from 9516-1131. He had ARYA with Cr up to 6.5 on 12/4/23 during an ED visit for /126. His Cr improved to 2.0 by 1/17/24 though dae to 2.8 from 1/18-1/20/24 during a hospitalization for hypoglycemia. His Cr is 2.4 today. No proteinuria.   He was diagnosed with HTN > 20 years ago. No hospitalizations for hypertensive emergency. His BP is currently well-controlled; no edema.   He was diagnosed with T2DM over the holidays. Glucose is currently well-controlled.   He denies h/o kidney stones or gout. He reports previously taking ibuprofen 1-2x/day. No family h/o kidney disease.   He is on Abiraterone for his prostate cancer.   His K is 3.4 today. Chart review reveals intermittent hypoK since 2021. He reports good appetite.         Past Medical History:   Diagnosis Date    Diabetes mellitus     HTN (hypertension) 05/09/2013 2005    LBP radiating to right leg 05/09/2013    Smokes < 1 pack of cigarettes per day 05/09/2013    1/2 ppd       Past Surgical History:   Procedure Laterality Date    BACK SURGERY      c-spine fussion  2008       Family History   Problem Relation Name Age of Onset    Hypertension Mother         Social History     Tobacco Use    Smoking status: Every Day     Current packs/day: 1.00     Types: Cigarettes    Smokeless tobacco: Never   Substance Use Topics    Alcohol use: No    Drug use: Yes     Types: Marijuana         ROS:  Complete ROS otherwise negative except as indicated above.         Current Outpatient Medications:     abiraterone (ZYTIGA) 500 mg Tab, Take 1,000 mg by mouth once daily., Disp: ,  "Rfl:     albuterol (PROVENTIL/VENTOLIN HFA) 90 mcg/actuation inhaler, SMARTSI-2 Puff(s) By Mouth 3 Times Daily PRN, Disp: , Rfl:     ammonium lactate 12 % Crea, Apply 1 Tube topically as needed., Disp: , Rfl:     fluticasone propionate (FLONASE) 50 mcg/actuation nasal spray, SMARTSI-2 Spray(s) Both Nares Daily, Disp: , Rfl:     glipiZIDE (GLUCOTROL) 10 MG TR24, Take 10 mg by mouth daily with breakfast., Disp: , Rfl:     HYDROcodone-acetaminophen (NORCO) 7.5-325 mg per tablet, Take 1 tablet by mouth every 6 (six) hours as needed., Disp: , Rfl:     hydrOXYzine HCL (ATARAX) 25 MG tablet, Take 25 mg by mouth every evening., Disp: , Rfl:     olmesartan (BENICAR) 20 MG tablet, Take 20 mg by mouth once daily., Disp: , Rfl:     predniSONE (DELTASONE) 2.5 MG tablet, Take 1 tablet by mouth once daily., Disp: , Rfl:     tamsulosin (FLOMAX) 0.4 mg Cap, Take 0.4 mg by mouth once daily., Disp: , Rfl:     TRUE METRIX GLUCOSE METER Misc, USE TO CHECK BLOOD SUGAR DAILY AT HOME, Disp: , Rfl:     TRUE METRIX GLUCOSE TEST STRIP Strp, SMARTSIG:Via Meter Daily, Disp: , Rfl:     ULTRA THIN LANCETS 30 gauge Misc, USE TO CHECK BLOOD GLUCOSE EVERY DAY, Disp: , Rfl:     amLODIPine (NORVASC) 10 MG tablet, Take 1 tablet (10 mg total) by mouth once daily. (Patient not taking: Reported on 2024), Disp: 30 tablet, Rfl: 6    potassium chloride (MICRO-K) 10 MEQ CpSR, Take 1 capsule (10 mEq total) by mouth once daily., Disp: 30 capsule, Rfl: 11    promethazine-dextromethorphan (PROMETHAZINE-DM) 6.25-15 mg/5 mL Syrp, Take 5-10 mLs by mouth every 4 (four) hours as needed. (Patient not taking: Reported on 2024), Disp: , Rfl:       Vitals: Blood pressure 130/70, pulse 75, height 6' 1" (1.854 m), weight 89 kg (196 lb 5.1 oz), SpO2 97%. Body mass index is 25.9 kg/m².    Physical Exam  Vitals reviewed.   Constitutional:       General: He is awake. He is not in acute distress.     Appearance: Normal appearance. He is well-developed.   HENT:    "   Head: Normocephalic and atraumatic.      Nose: Nose normal.      Mouth/Throat:      Mouth: Mucous membranes are moist.   Eyes:      Extraocular Movements: Extraocular movements intact.      Conjunctiva/sclera: Conjunctivae normal.   Pulmonary:      Effort: Pulmonary effort is normal.   Musculoskeletal:         General: No tenderness or signs of injury.      Right lower leg: No edema.      Left lower leg: No edema.   Skin:     General: Skin is warm and dry.      Findings: No erythema or rash.   Neurological:      General: No focal deficit present.      Mental Status: He is alert. Mental status is at baseline.   Psychiatric:         Mood and Affect: Mood normal.         Behavior: Behavior normal.           Labs/Imaging:  Sodium   Date Value Ref Range Status   04/12/2024 138 136 - 145 mmol/L Final   01/20/2024 137 136 - 145 mmol/L Final   01/19/2024 128 (L) 136 - 145 mmol/L Final     Potassium   Date Value Ref Range Status   04/12/2024 3.4 (L) 3.5 - 5.1 mmol/L Final   01/20/2024 3.8 3.5 - 5.1 mmol/L Final   01/19/2024 3.5 3.5 - 5.1 mmol/L Final     Chloride   Date Value Ref Range Status   04/12/2024 108 95 - 110 mmol/L Final   01/20/2024 97 95 - 110 mmol/L Final   01/19/2024 88 (L) 95 - 110 mmol/L Final     CO2   Date Value Ref Range Status   04/12/2024 28 23 - 29 mmol/L Final   01/20/2024 27 23 - 29 mmol/L Final   01/19/2024 30 (H) 23 - 29 mmol/L Final     BUN   Date Value Ref Range Status   04/12/2024 29 (H) 6 - 20 mg/dL Final   01/20/2024 38 (H) 6 - 20 mg/dL Final   01/19/2024 30 (H) 6 - 20 mg/dL Final     Creatinine   Date Value Ref Range Status   04/12/2024 2.4 (H) 0.5 - 1.4 mg/dL Final   01/20/2024 2.8 (H) 0.5 - 1.4 mg/dL Final   01/19/2024 2.8 (H) 0.5 - 1.4 mg/dL Final     eGFR   Date Value Ref Range Status   04/12/2024 30 (A) >60 mL/min/1.73 m^2 Final   01/20/2024 25 (A) >60 mL/min/1.73 m^2 Final   01/19/2024 25 (A) >60 mL/min/1.73 m^2 Final     Glucose   Date Value Ref Range Status   06/03/2021 118 (H) 65 -  99 mg/dL Final     Calcium   Date Value Ref Range Status   04/12/2024 9.5 8.7 - 10.5 mg/dL Final   01/20/2024 8.7 8.7 - 10.5 mg/dL Final   01/19/2024 8.2 (L) 8.7 - 10.5 mg/dL Final     Phosphorus   Date Value Ref Range Status   04/12/2024 3.4 2.7 - 4.5 mg/dL Final   01/19/2024 2.4 (L) 2.7 - 4.5 mg/dL Final     Albumin   Date Value Ref Range Status   04/12/2024 4.1 3.5 - 5.2 g/dL Final   01/20/2024 3.7 3.5 - 5.2 g/dL Final   01/19/2024 3.4 (L) 3.5 - 5.2 g/dL Final       PTH, Intact   Date Value Ref Range Status   04/12/2024 90.4 (H) 9.0 - 77.0 pg/mL Final     Uric Acid   Date Value Ref Range Status   04/12/2024 5.4 3.4 - 7.0 mg/dL Final       Hemoglobin   Date Value Ref Range Status   04/12/2024 10.4 (L) 14.0 - 18.0 g/dL Final   01/20/2024 10.6 (L) 14.0 - 18.0 g/dL Final   01/19/2024 9.9 (L) 14.0 - 18.0 g/dL Final       Prot/Creat Ratio, Urine   Date Value Ref Range Status   04/12/2024 0.16 0.00 - 0.20 Final           Renal US: 2/29/24:   The kidneys are normal in size and echotexture.   The right kidney measures 12.9 cm.   The left kidney measures 14.2 cm.      There is severe bilateral hydronephrosis with parenchymal volume loss, right greater than left.   No stones are seen.     The urinary bladder is normal.       Impression/Plan:    Chronic kidney disease, stage IV (severe)  - baseline Cr 1.0-1.2 in 2021; no labs from 1535-6204  - Cr 6.5 on 12/4/23; associated with BP 220s/120s  - Cr improved to 2.0 in 1/2024 but back up to 2.8 later that month  - last renal US and CT with bilateral hydronephrosis  - Cr 2.4 today. Unclear if this is related to prior severe ATN which hasn't fully recovered, ongoing hydronephrosis, or progression of CKD  - UPCR 0.16; negative. Continue olmesartan.   - repeating renal US  - close follow-up  - I foresee being the provider to manage this complex disease in the future.     Hypokalemia  - intermittently present since 2021  - K 3.4 today  - starting KCl 10meq daily  - given longstanding  h/o HTN, ordered renin and lela levels     Hydronephrosis  - noted on recent imaging  - repeating renal US to look for resolution; if still present, will reach out to his Urologist    Anemia, chronic renal failure, stage 4 (severe)  - hgb 10.4 today; at goal for CKD  - will trend    Secondary renal hyperparathyroidism  - PTH 90; acceptable. CCa and phos normal. Will trend.     Essential hypertension  - controlled on current regimen  - given associated hypoK and borderline alkalosis; ordered renin and lela levels           Treatment options and plan were discussed with the patient and/or caregiver.   RTC 4 weeks.      Marie Downs MD  [facility]  [phone]

## 2024-04-16 ENCOUNTER — OFFICE VISIT (OUTPATIENT)
Dept: NEPHROLOGY | Facility: CLINIC | Age: 59
End: 2024-04-16
Payer: MEDICAID

## 2024-04-16 VITALS
SYSTOLIC BLOOD PRESSURE: 130 MMHG | OXYGEN SATURATION: 97 % | HEIGHT: 73 IN | WEIGHT: 196.31 LBS | DIASTOLIC BLOOD PRESSURE: 70 MMHG | BODY MASS INDEX: 26.02 KG/M2 | HEART RATE: 75 BPM

## 2024-04-16 DIAGNOSIS — I10 ESSENTIAL HYPERTENSION: Chronic | ICD-10-CM

## 2024-04-16 DIAGNOSIS — N13.30 HYDRONEPHROSIS, UNSPECIFIED HYDRONEPHROSIS TYPE: ICD-10-CM

## 2024-04-16 DIAGNOSIS — N18.4 ANEMIA, CHRONIC RENAL FAILURE, STAGE 4 (SEVERE): ICD-10-CM

## 2024-04-16 DIAGNOSIS — D63.1 ANEMIA, CHRONIC RENAL FAILURE, STAGE 4 (SEVERE): ICD-10-CM

## 2024-04-16 DIAGNOSIS — N25.81 SECONDARY RENAL HYPERPARATHYROIDISM: ICD-10-CM

## 2024-04-16 DIAGNOSIS — N18.4 CHRONIC KIDNEY DISEASE, STAGE IV (SEVERE): Primary | ICD-10-CM

## 2024-04-16 DIAGNOSIS — E87.6 HYPOKALEMIA: ICD-10-CM

## 2024-04-16 PROCEDURE — 99999 PR PBB SHADOW E&M-EST. PATIENT-LVL V: CPT | Mod: PBBFAC,,, | Performed by: INTERNAL MEDICINE

## 2024-04-16 PROCEDURE — 99215 OFFICE O/P EST HI 40 MIN: CPT | Mod: PBBFAC | Performed by: INTERNAL MEDICINE

## 2024-04-16 PROCEDURE — 3066F NEPHROPATHY DOC TX: CPT | Mod: ,,, | Performed by: INTERNAL MEDICINE

## 2024-04-16 PROCEDURE — 4010F ACE/ARB THERAPY RXD/TAKEN: CPT | Mod: ,,, | Performed by: INTERNAL MEDICINE

## 2024-04-16 PROCEDURE — 99204 OFFICE O/P NEW MOD 45 MIN: CPT | Mod: S$PBB,,, | Performed by: INTERNAL MEDICINE

## 2024-04-16 PROCEDURE — G2211 COMPLEX E/M VISIT ADD ON: HCPCS | Mod: S$PBB,,, | Performed by: INTERNAL MEDICINE

## 2024-04-16 RX ORDER — AMMONIUM LACTATE 12 G/100G
1 CREAM TOPICAL
COMMUNITY
Start: 2024-03-22

## 2024-04-16 RX ORDER — GLIPIZIDE 10 MG/1
10 TABLET, FILM COATED, EXTENDED RELEASE ORAL
COMMUNITY
Start: 2024-04-08

## 2024-04-16 RX ORDER — PREDNISONE 2.5 MG/1
1 TABLET ORAL DAILY
COMMUNITY
Start: 2024-03-26 | End: 2024-05-15

## 2024-04-16 RX ORDER — ABIRATERONE 500 MG/1
1000 TABLET ORAL DAILY
COMMUNITY
Start: 2024-03-26 | End: 2025-03-26

## 2024-04-16 RX ORDER — HYDROCODONE BITARTRATE AND ACETAMINOPHEN 7.5; 325 MG/1; MG/1
1 TABLET ORAL EVERY 6 HOURS PRN
COMMUNITY
Start: 2024-04-01

## 2024-04-16 RX ORDER — OLMESARTAN MEDOXOMIL 20 MG/1
20 TABLET ORAL DAILY
COMMUNITY
Start: 2024-02-23

## 2024-04-16 RX ORDER — POTASSIUM CHLORIDE 750 MG/1
10 CAPSULE, EXTENDED RELEASE ORAL DAILY
Qty: 30 CAPSULE | Refills: 11 | Status: SHIPPED | OUTPATIENT
Start: 2024-04-16

## 2024-04-16 NOTE — ASSESSMENT & PLAN NOTE
- baseline Cr 1.0-1.2 in 2021; no labs from 0597-3175  - Cr 6.5 on 12/4/23; associated with BP 220s/120s  - Cr improved to 2.0 in 1/2024 but back up to 2.8 later that month  - last renal US and CT with bilateral hydronephrosis  - Cr 2.4 today. Unclear if this is related to prior severe ATN which hasn't fully recovered, ongoing hydronephrosis, or progression of CKD  - UPCR 0.16; negative. Continue olmesartan.   - repeating renal US  - close follow-up  - I foresee being the provider to manage this complex disease in the future.

## 2024-04-16 NOTE — ASSESSMENT & PLAN NOTE
- intermittently present since 2021  - K 3.4 today  - starting KCl 10meq daily  - given longstanding h/o HTN, ordered renin and lela levels

## 2024-04-16 NOTE — ASSESSMENT & PLAN NOTE
- noted on recent imaging  - repeating renal US to look for resolution; if still present, will reach out to his Urologist

## 2024-04-16 NOTE — ASSESSMENT & PLAN NOTE
- controlled on current regimen  - given associated hypoK and borderline alkalosis; ordered renin and lela levels

## 2024-04-22 PROBLEM — N17.9 AKI (ACUTE KIDNEY INJURY): Status: RESOLVED | Noted: 2024-01-18 | Resolved: 2024-04-22

## 2024-05-06 ENCOUNTER — HOSPITAL ENCOUNTER (OUTPATIENT)
Dept: RADIOLOGY | Facility: HOSPITAL | Age: 59
Discharge: HOME | End: 2024-05-06
Attending: INTERNAL MEDICINE
Payer: MEDICAID

## 2024-05-06 DIAGNOSIS — E87.6 HYPOKALEMIA: ICD-10-CM

## 2024-05-06 DIAGNOSIS — N18.4 CHRONIC KIDNEY DISEASE, STAGE IV (SEVERE): ICD-10-CM

## 2024-05-06 DIAGNOSIS — N25.81 SECONDARY RENAL HYPERPARATHYROIDISM: ICD-10-CM

## 2024-05-06 DIAGNOSIS — N18.4 ANEMIA, CHRONIC RENAL FAILURE, STAGE 4 (SEVERE): ICD-10-CM

## 2024-05-06 DIAGNOSIS — D63.1 ANEMIA, CHRONIC RENAL FAILURE, STAGE 4 (SEVERE): ICD-10-CM

## 2024-05-06 DIAGNOSIS — I10 ESSENTIAL HYPERTENSION: Chronic | ICD-10-CM

## 2024-05-06 DIAGNOSIS — N13.30 HYDRONEPHROSIS, UNSPECIFIED HYDRONEPHROSIS TYPE: ICD-10-CM

## 2024-05-06 PROCEDURE — 76770 US EXAM ABDO BACK WALL COMP: CPT | Mod: 26,,, | Performed by: RADIOLOGY

## 2024-05-06 PROCEDURE — 76770 US EXAM ABDO BACK WALL COMP: CPT | Mod: TC

## 2024-05-09 ENCOUNTER — LAB VISIT (OUTPATIENT)
Dept: LAB | Facility: HOSPITAL | Age: 59
End: 2024-05-09
Attending: INTERNAL MEDICINE
Payer: MEDICAID

## 2024-05-09 DIAGNOSIS — E87.6 HYPOKALEMIA: ICD-10-CM

## 2024-05-09 DIAGNOSIS — N25.81 SECONDARY RENAL HYPERPARATHYROIDISM: ICD-10-CM

## 2024-05-09 DIAGNOSIS — N18.4 CHRONIC KIDNEY DISEASE, STAGE IV (SEVERE): ICD-10-CM

## 2024-05-09 DIAGNOSIS — D63.1 ANEMIA, CHRONIC RENAL FAILURE, STAGE 4 (SEVERE): ICD-10-CM

## 2024-05-09 DIAGNOSIS — N13.30 HYDRONEPHROSIS, UNSPECIFIED HYDRONEPHROSIS TYPE: ICD-10-CM

## 2024-05-09 DIAGNOSIS — N18.4 ANEMIA, CHRONIC RENAL FAILURE, STAGE 4 (SEVERE): ICD-10-CM

## 2024-05-09 DIAGNOSIS — I10 ESSENTIAL HYPERTENSION: Chronic | ICD-10-CM

## 2024-05-09 LAB
ALBUMIN SERPL BCP-MCNC: 4.1 G/DL (ref 3.5–5.2)
ANION GAP SERPL CALC-SCNC: 7 MMOL/L (ref 8–16)
BASOPHILS # BLD AUTO: 0.05 K/UL (ref 0–0.2)
BASOPHILS NFR BLD: 0.8 % (ref 0–1.9)
BUN SERPL-MCNC: 44 MG/DL (ref 6–20)
CALCIUM SERPL-MCNC: 9.4 MG/DL (ref 8.7–10.5)
CHLORIDE SERPL-SCNC: 107 MMOL/L (ref 95–110)
CO2 SERPL-SCNC: 22 MMOL/L (ref 23–29)
CREAT SERPL-MCNC: 2.4 MG/DL (ref 0.5–1.4)
DIFFERENTIAL METHOD BLD: ABNORMAL
EOSINOPHIL # BLD AUTO: 0.2 K/UL (ref 0–0.5)
EOSINOPHIL NFR BLD: 2.6 % (ref 0–8)
ERYTHROCYTE [DISTWIDTH] IN BLOOD BY AUTOMATED COUNT: 13.3 % (ref 11.5–14.5)
EST. GFR  (NO RACE VARIABLE): 30 ML/MIN/1.73 M^2
GLUCOSE SERPL-MCNC: 110 MG/DL (ref 70–110)
HCT VFR BLD AUTO: 35.8 % (ref 40–54)
HGB BLD-MCNC: 11.2 G/DL (ref 14–18)
IMM GRANULOCYTES # BLD AUTO: 0.02 K/UL (ref 0–0.04)
IMM GRANULOCYTES NFR BLD AUTO: 0.3 % (ref 0–0.5)
LYMPHOCYTES # BLD AUTO: 2 K/UL (ref 1–4.8)
LYMPHOCYTES NFR BLD: 33.3 % (ref 18–48)
MAGNESIUM SERPL-MCNC: 2 MG/DL (ref 1.6–2.6)
MCH RBC QN AUTO: 27.7 PG (ref 27–31)
MCHC RBC AUTO-ENTMCNC: 31.3 G/DL (ref 32–36)
MCV RBC AUTO: 88 FL (ref 82–98)
MONOCYTES # BLD AUTO: 0.5 K/UL (ref 0.3–1)
MONOCYTES NFR BLD: 8.9 % (ref 4–15)
NEUTROPHILS # BLD AUTO: 3.3 K/UL (ref 1.8–7.7)
NEUTROPHILS NFR BLD: 54.1 % (ref 38–73)
NRBC BLD-RTO: 0 /100 WBC
PHOSPHATE SERPL-MCNC: 3.5 MG/DL (ref 2.7–4.5)
PLATELET # BLD AUTO: 285 K/UL (ref 150–450)
PMV BLD AUTO: 9.7 FL (ref 9.2–12.9)
POTASSIUM SERPL-SCNC: 4.8 MMOL/L (ref 3.5–5.1)
PTH-INTACT SERPL-MCNC: 56.9 PG/ML (ref 9–77)
RBC # BLD AUTO: 4.05 M/UL (ref 4.6–6.2)
SODIUM SERPL-SCNC: 136 MMOL/L (ref 136–145)
URATE SERPL-MCNC: 5.1 MG/DL (ref 3.4–7)
WBC # BLD AUTO: 6.09 K/UL (ref 3.9–12.7)

## 2024-05-09 PROCEDURE — 80069 RENAL FUNCTION PANEL: CPT | Performed by: INTERNAL MEDICINE

## 2024-05-09 PROCEDURE — 84550 ASSAY OF BLOOD/URIC ACID: CPT | Performed by: INTERNAL MEDICINE

## 2024-05-09 PROCEDURE — 84244 ASSAY OF RENIN: CPT | Performed by: INTERNAL MEDICINE

## 2024-05-09 PROCEDURE — 82088 ASSAY OF ALDOSTERONE: CPT | Performed by: INTERNAL MEDICINE

## 2024-05-09 PROCEDURE — 83970 ASSAY OF PARATHORMONE: CPT | Performed by: INTERNAL MEDICINE

## 2024-05-09 PROCEDURE — 36415 COLL VENOUS BLD VENIPUNCTURE: CPT | Performed by: INTERNAL MEDICINE

## 2024-05-09 PROCEDURE — 83735 ASSAY OF MAGNESIUM: CPT | Performed by: INTERNAL MEDICINE

## 2024-05-09 PROCEDURE — 85025 COMPLETE CBC W/AUTO DIFF WBC: CPT | Performed by: INTERNAL MEDICINE

## 2024-05-13 LAB
ALDOST SERPL-MCNC: 11 NG/DL
RENIN PLAS-CCNC: 1.9 NG/ML/H

## 2024-05-15 ENCOUNTER — OFFICE VISIT (OUTPATIENT)
Dept: NEPHROLOGY | Facility: CLINIC | Age: 59
End: 2024-05-15
Payer: MEDICAID

## 2024-05-15 VITALS
HEIGHT: 73 IN | BODY MASS INDEX: 26.05 KG/M2 | WEIGHT: 196.56 LBS | SYSTOLIC BLOOD PRESSURE: 124 MMHG | HEART RATE: 85 BPM | OXYGEN SATURATION: 98 % | RESPIRATION RATE: 18 BRPM | DIASTOLIC BLOOD PRESSURE: 78 MMHG

## 2024-05-15 DIAGNOSIS — N13.30 HYDRONEPHROSIS, UNSPECIFIED HYDRONEPHROSIS TYPE: ICD-10-CM

## 2024-05-15 DIAGNOSIS — N18.4 CHRONIC KIDNEY DISEASE, STAGE IV (SEVERE): Primary | ICD-10-CM

## 2024-05-15 DIAGNOSIS — N25.81 SECONDARY RENAL HYPERPARATHYROIDISM: ICD-10-CM

## 2024-05-15 DIAGNOSIS — N18.4 ANEMIA, CHRONIC RENAL FAILURE, STAGE 4 (SEVERE): ICD-10-CM

## 2024-05-15 DIAGNOSIS — E87.6 HYPOKALEMIA: ICD-10-CM

## 2024-05-15 DIAGNOSIS — I10 ESSENTIAL HYPERTENSION: ICD-10-CM

## 2024-05-15 DIAGNOSIS — D63.1 ANEMIA, CHRONIC RENAL FAILURE, STAGE 4 (SEVERE): ICD-10-CM

## 2024-05-15 PROCEDURE — 1160F RVW MEDS BY RX/DR IN RCRD: CPT | Mod: CPTII,,, | Performed by: INTERNAL MEDICINE

## 2024-05-15 PROCEDURE — 99999 PR PBB SHADOW E&M-EST. PATIENT-LVL V: CPT | Mod: PBBFAC,,, | Performed by: INTERNAL MEDICINE

## 2024-05-15 PROCEDURE — 3066F NEPHROPATHY DOC TX: CPT | Mod: CPTII,,, | Performed by: INTERNAL MEDICINE

## 2024-05-15 PROCEDURE — 1159F MED LIST DOCD IN RCRD: CPT | Mod: CPTII,,, | Performed by: INTERNAL MEDICINE

## 2024-05-15 PROCEDURE — 99215 OFFICE O/P EST HI 40 MIN: CPT | Mod: PBBFAC | Performed by: INTERNAL MEDICINE

## 2024-05-15 PROCEDURE — 4010F ACE/ARB THERAPY RXD/TAKEN: CPT | Mod: CPTII,,, | Performed by: INTERNAL MEDICINE

## 2024-05-15 PROCEDURE — 3074F SYST BP LT 130 MM HG: CPT | Mod: CPTII,,, | Performed by: INTERNAL MEDICINE

## 2024-05-15 PROCEDURE — 99214 OFFICE O/P EST MOD 30 MIN: CPT | Mod: S$PBB,,, | Performed by: INTERNAL MEDICINE

## 2024-05-15 PROCEDURE — 3008F BODY MASS INDEX DOCD: CPT | Mod: CPTII,,, | Performed by: INTERNAL MEDICINE

## 2024-05-15 PROCEDURE — 3044F HG A1C LEVEL LT 7.0%: CPT | Mod: CPTII,,, | Performed by: INTERNAL MEDICINE

## 2024-05-15 PROCEDURE — 3078F DIAST BP <80 MM HG: CPT | Mod: CPTII,,, | Performed by: INTERNAL MEDICINE

## 2024-05-15 NOTE — PATIENT INSTRUCTIONS
Your potassium was 4.8 today. This is normal but on the high side of normal.  Lets decrease your potassium pill to every OTHER day.

## 2024-05-15 NOTE — ASSESSMENT & PLAN NOTE
- severe bilateral hydronephrosis via US in 2/2024; subsequently had prostate surgery  - now with mild bilateral hydronephrosis. Unsure if this is contributing to ARYA. Reaching out to patient's Urologist

## 2024-05-15 NOTE — ASSESSMENT & PLAN NOTE
- baseline Cr 1.0-1.2 in 2021; no labs from 3261-5869  - Cr 6.5 on 12/4/23; associated with BP 220s/120s  - Cr improved to 2.0 in 1/2024 but back up to 2.8 later that month. Renal US in 2/2024 with severe bilateral hydronephrosis. He subsequently underwent prostate surgery  - Cr 2.4 at last visit --> 2.4 today; stable but not improved. Renal US remains with mild hydronephrosis; will send note and imaging to Dr. Yip to review  - UPCR negative. Continue olmesartan.  - continue good water intake. Low salt diet.

## 2024-05-15 NOTE — ASSESSMENT & PLAN NOTE
- K 3.4 --> 4.8 today on KCl 10meq daily  - renin and lela levels are normal  - decreasing KCl from QD to QOD

## 2024-05-15 NOTE — PROGRESS NOTES
[facility]  [address] [facility], [address]  [place]  [phone]    PCP: [facility]  Oncologist: Raheem  Urologist: Theodora      HPI: Ramiro Ca is a 59 y.o. male with HTN, T2DM, and metastatic prostate cancer (s/p prostatectomy) who is here for established patient evaluation of Chronic Kidney Disease  Initial visit 24. His Cr was 1.0-1.2 from 8366-6399. He had ARYA with Cr up to 6.5 on 23 during an ED visit for /126. His Cr improved to 2.0 by 24 though dae to 2.8 from -24 during a hospitalization for hypoglycemia. His Cr is 2.4 today. No proteinuria.   He was diagnosed with HTN > 20 years ago. No hospitalizations for hypertensive emergency. His BP is currently well-controlled; no edema.   He was diagnosed with T2DM over the holidays. Glucose is currently well-controlled.   He denies h/o kidney stones or gout. He reports previously taking ibuprofen 1-2x/day. No family h/o kidney disease.   He is on Abiraterone for his prostate cancer.   His K is 3.4 today. Chart review reveals intermittent hypoK since . He reports good appetite.     Interval Hx:   LV 24: started KCl 10meq daily.  Today he reports to be doing well. No issues with chemo pill.   BP controlled at home. No edema.   Denies urinary retention or suprapubic pain. He notes increased urinary frequency; especially at night.  Next visit with Urology is in .       ROS:  Complete ROS otherwise negative except as indicated above.         Current Outpatient Medications:     abiraterone (ZYTIGA) 500 mg Tab, Take 1,000 mg by mouth once daily., Disp: , Rfl:     albuterol (PROVENTIL/VENTOLIN HFA) 90 mcg/actuation inhaler, SMARTSI-2 Puff(s) By Mouth 3 Times Daily PRN, Disp: , Rfl:     ammonium lactate 12 % Crea, Apply 1 Tube topically as needed., Disp: , Rfl:     fluticasone propionate (FLONASE) 50 mcg/actuation nasal spray, SMARTSI-2 Spray(s) Both Nares Daily, Disp: , Rfl:     glipiZIDE  "(GLUCOTROL) 10 MG TR24, Take 10 mg by mouth daily with breakfast., Disp: , Rfl:     HYDROcodone-acetaminophen (NORCO) 7.5-325 mg per tablet, Take 1 tablet by mouth every 6 (six) hours as needed., Disp: , Rfl:     olmesartan (BENICAR) 20 MG tablet, Take 20 mg by mouth once daily., Disp: , Rfl:     potassium chloride (MICRO-K) 10 MEQ CpSR, Take 1 capsule (10 mEq total) by mouth once daily., Disp: 30 capsule, Rfl: 11    tamsulosin (FLOMAX) 0.4 mg Cap, Take 0.4 mg by mouth once daily., Disp: , Rfl:     TRUE METRIX GLUCOSE METER Misc, USE TO CHECK BLOOD SUGAR DAILY AT HOME, Disp: , Rfl:     TRUE METRIX GLUCOSE TEST STRIP Strp, SMARTSIG:Via Meter Daily, Disp: , Rfl:     ULTRA THIN LANCETS 30 gauge Misc, USE TO CHECK BLOOD GLUCOSE EVERY DAY, Disp: , Rfl:     amLODIPine (NORVASC) 10 MG tablet, Take 1 tablet (10 mg total) by mouth once daily. (Patient not taking: Reported on 4/16/2024), Disp: 30 tablet, Rfl: 6      Vitals: Blood pressure 124/78, pulse 85, resp. rate 18, height 6' 1" (1.854 m), weight 89.1 kg (196 lb 8.6 oz), SpO2 98%. Body mass index is 25.93 kg/m².    Physical Exam  Vitals reviewed.   Constitutional:       General: He is awake. He is not in acute distress.     Appearance: Normal appearance. He is well-developed.   HENT:      Head: Normocephalic and atraumatic.      Nose: Nose normal.      Mouth/Throat:      Mouth: Mucous membranes are moist.   Eyes:      Extraocular Movements: Extraocular movements intact.      Conjunctiva/sclera: Conjunctivae normal.   Pulmonary:      Effort: Pulmonary effort is normal.   Musculoskeletal:         General: No tenderness or signs of injury.      Right lower leg: No edema.      Left lower leg: No edema.   Skin:     General: Skin is warm and dry.      Findings: No erythema or rash.   Neurological:      General: No focal deficit present.      Mental Status: He is alert. Mental status is at baseline.   Psychiatric:         Mood and Affect: Mood normal.         Behavior: Behavior " normal.             Labs/Imaging:  Sodium   Date Value Ref Range Status   05/09/2024 136 136 - 145 mmol/L Final   04/12/2024 138 136 - 145 mmol/L Final   01/20/2024 137 136 - 145 mmol/L Final     Potassium   Date Value Ref Range Status   05/09/2024 4.8 3.5 - 5.1 mmol/L Final   04/12/2024 3.4 (L) 3.5 - 5.1 mmol/L Final   01/20/2024 3.8 3.5 - 5.1 mmol/L Final     Chloride   Date Value Ref Range Status   05/09/2024 107 95 - 110 mmol/L Final   04/12/2024 108 95 - 110 mmol/L Final   01/20/2024 97 95 - 110 mmol/L Final     CO2   Date Value Ref Range Status   05/09/2024 22 (L) 23 - 29 mmol/L Final   04/12/2024 28 23 - 29 mmol/L Final   01/20/2024 27 23 - 29 mmol/L Final     BUN   Date Value Ref Range Status   05/09/2024 44 (H) 6 - 20 mg/dL Final   04/12/2024 29 (H) 6 - 20 mg/dL Final   01/20/2024 38 (H) 6 - 20 mg/dL Final     Creatinine   Date Value Ref Range Status   05/09/2024 2.4 (H) 0.5 - 1.4 mg/dL Final   04/12/2024 2.4 (H) 0.5 - 1.4 mg/dL Final   01/20/2024 2.8 (H) 0.5 - 1.4 mg/dL Final     eGFR   Date Value Ref Range Status   05/09/2024 30 (A) >60 mL/min/1.73 m^2 Final   04/12/2024 30 (A) >60 mL/min/1.73 m^2 Final   01/20/2024 25 (A) >60 mL/min/1.73 m^2 Final     Glucose   Date Value Ref Range Status   06/03/2021 118 (H) 65 - 99 mg/dL Final     Calcium   Date Value Ref Range Status   05/09/2024 9.4 8.7 - 10.5 mg/dL Final   04/12/2024 9.5 8.7 - 10.5 mg/dL Final   01/20/2024 8.7 8.7 - 10.5 mg/dL Final     Phosphorus   Date Value Ref Range Status   05/09/2024 3.5 2.7 - 4.5 mg/dL Final   04/12/2024 3.4 2.7 - 4.5 mg/dL Final   01/19/2024 2.4 (L) 2.7 - 4.5 mg/dL Final     Albumin   Date Value Ref Range Status   05/09/2024 4.1 3.5 - 5.2 g/dL Final   04/12/2024 4.1 3.5 - 5.2 g/dL Final   01/20/2024 3.7 3.5 - 5.2 g/dL Final       PTH, Intact   Date Value Ref Range Status   05/09/2024 56.9 9.0 - 77.0 pg/mL Final   04/12/2024 90.4 (H) 9.0 - 77.0 pg/mL Final     Uric Acid   Date Value Ref Range Status   05/09/2024 5.1 3.4 - 7.0  mg/dL Final   04/12/2024 5.4 3.4 - 7.0 mg/dL Final       Hemoglobin   Date Value Ref Range Status   05/09/2024 11.2 (L) 14.0 - 18.0 g/dL Final   04/12/2024 10.4 (L) 14.0 - 18.0 g/dL Final   01/20/2024 10.6 (L) 14.0 - 18.0 g/dL Final       Prot/Creat Ratio, Urine   Date Value Ref Range Status   05/09/2024 0.11 0.00 - 0.20 Final   04/12/2024 0.16 0.00 - 0.20 Final           Renal US:   5/9/24:   Right kidney: The right kidney measures 11 cm. No cortical thinning. No loss of corticomedullary distinction. Resistive index measures 0.54.  No mass. No renal stone. There is mild hydronephrosis.     Left kidney: The left kidney measures 13 cm. No cortical thinning. No loss of corticomedullary distinction. Resistive index measures 0.57.  No mass. No renal stone. There is mild hydronephrosis..         2/29/24:   The kidneys are normal in size and echotexture.   The right kidney measures 12.9 cm.   The left kidney measures 14.2 cm.      There is severe bilateral hydronephrosis with parenchymal volume loss, right greater than left.   No stones are seen.         Impression/Plan:    Chronic kidney disease, stage IV (severe)  - baseline Cr 1.0-1.2 in 2021; no labs from 2981-0889  - Cr 6.5 on 12/4/23; associated with BP 220s/120s  - Cr improved to 2.0 in 1/2024 but back up to 2.8 later that month. Renal US in 2/2024 with severe bilateral hydronephrosis. He subsequently underwent prostate surgery  - Cr 2.4 at last visit --> 2.4 today; stable but not improved. Renal US remains with mild hydronephrosis; will send note and imaging to Dr. Yip to review  - UPCR negative. Continue olmesartan.  - continue good water intake. Low salt diet.     Hypokalemia  - K 3.4 --> 4.8 today on KCl 10meq daily  - renin and lela levels are normal  - decreasing KCl from QD to QOD    Hydronephrosis  - severe bilateral hydronephrosis via US in 2/2024; subsequently had prostate surgery  - now with mild bilateral hydronephrosis. Unsure if this is contributing  to ARYA. Reaching out to patient's Urologist    Anemia, chronic renal failure, stage 4 (severe)  - hgb 10.4 --> 11.2; improved. Will trend.     Secondary renal hyperparathyroidism  - PTH 90 --> 57; controlled. CCa and phos normal.     Essential hypertension  - controlled         Visit today included increased complexity associated with the care of the episodic problem CKD addressed and managing the longitudinal care of the patient due to the serious and/or complex managed problem(s) CKD, HTN.        Treatment options and plan were discussed with the patient and/or caregiver.   RTC 3 months.       Marie Downs MD  [facility]  [phone]

## 2024-05-15 NOTE — Clinical Note
Can you please fax a copy of my note and patient's last ultrasound to his urologist, Dr. Niharika Yip, with [facility] [place]? Thanks! No

## 2024-08-15 ENCOUNTER — LAB VISIT (OUTPATIENT)
Dept: LAB | Facility: HOSPITAL | Age: 59
End: 2024-08-15
Attending: INTERNAL MEDICINE
Payer: MEDICAID

## 2024-08-15 DIAGNOSIS — N13.30 HYDRONEPHROSIS, UNSPECIFIED HYDRONEPHROSIS TYPE: ICD-10-CM

## 2024-08-15 DIAGNOSIS — N18.4 ANEMIA, CHRONIC RENAL FAILURE, STAGE 4 (SEVERE): ICD-10-CM

## 2024-08-15 DIAGNOSIS — E87.6 HYPOKALEMIA: ICD-10-CM

## 2024-08-15 DIAGNOSIS — D63.1 ANEMIA, CHRONIC RENAL FAILURE, STAGE 4 (SEVERE): ICD-10-CM

## 2024-08-15 DIAGNOSIS — N18.4 CHRONIC KIDNEY DISEASE, STAGE IV (SEVERE): ICD-10-CM

## 2024-08-15 LAB
ALBUMIN SERPL BCP-MCNC: 4 G/DL (ref 3.5–5.2)
ANION GAP SERPL CALC-SCNC: 10 MMOL/L (ref 8–16)
BASOPHILS # BLD AUTO: 0.03 K/UL (ref 0–0.2)
BASOPHILS NFR BLD: 0.4 % (ref 0–1.9)
BUN SERPL-MCNC: 26 MG/DL (ref 6–20)
CALCIUM SERPL-MCNC: 9.4 MG/DL (ref 8.7–10.5)
CHLORIDE SERPL-SCNC: 106 MMOL/L (ref 95–110)
CO2 SERPL-SCNC: 24 MMOL/L (ref 23–29)
CREAT SERPL-MCNC: 1.8 MG/DL (ref 0.5–1.4)
DIFFERENTIAL METHOD BLD: ABNORMAL
EOSINOPHIL # BLD AUTO: 0.2 K/UL (ref 0–0.5)
EOSINOPHIL NFR BLD: 2.3 % (ref 0–8)
ERYTHROCYTE [DISTWIDTH] IN BLOOD BY AUTOMATED COUNT: 14.2 % (ref 11.5–14.5)
EST. GFR  (NO RACE VARIABLE): 43 ML/MIN/1.73 M^2
GLUCOSE SERPL-MCNC: 100 MG/DL (ref 70–110)
HCT VFR BLD AUTO: 35.4 % (ref 40–54)
HGB BLD-MCNC: 11.7 G/DL (ref 14–18)
IMM GRANULOCYTES # BLD AUTO: 0.03 K/UL (ref 0–0.04)
IMM GRANULOCYTES NFR BLD AUTO: 0.4 % (ref 0–0.5)
LYMPHOCYTES # BLD AUTO: 2.1 K/UL (ref 1–4.8)
LYMPHOCYTES NFR BLD: 28.5 % (ref 18–48)
MCH RBC QN AUTO: 29.6 PG (ref 27–31)
MCHC RBC AUTO-ENTMCNC: 33.1 G/DL (ref 32–36)
MCV RBC AUTO: 90 FL (ref 82–98)
MONOCYTES # BLD AUTO: 0.6 K/UL (ref 0.3–1)
MONOCYTES NFR BLD: 8.6 % (ref 4–15)
NEUTROPHILS # BLD AUTO: 4.4 K/UL (ref 1.8–7.7)
NEUTROPHILS NFR BLD: 59.8 % (ref 38–73)
NRBC BLD-RTO: 0 /100 WBC
PHOSPHATE SERPL-MCNC: 3.3 MG/DL (ref 2.7–4.5)
PLATELET # BLD AUTO: 290 K/UL (ref 150–450)
PMV BLD AUTO: 9.5 FL (ref 9.2–12.9)
POTASSIUM SERPL-SCNC: 4.2 MMOL/L (ref 3.5–5.1)
PTH-INTACT SERPL-MCNC: 51.4 PG/ML (ref 9–77)
RBC # BLD AUTO: 3.95 M/UL (ref 4.6–6.2)
SODIUM SERPL-SCNC: 140 MMOL/L (ref 136–145)
URATE SERPL-MCNC: 4.2 MG/DL (ref 3.4–7)
WBC # BLD AUTO: 7.4 K/UL (ref 3.9–12.7)

## 2024-08-15 PROCEDURE — 36415 COLL VENOUS BLD VENIPUNCTURE: CPT | Performed by: INTERNAL MEDICINE

## 2024-08-15 PROCEDURE — 84550 ASSAY OF BLOOD/URIC ACID: CPT | Performed by: INTERNAL MEDICINE

## 2024-08-15 PROCEDURE — 83970 ASSAY OF PARATHORMONE: CPT | Performed by: INTERNAL MEDICINE

## 2024-08-15 PROCEDURE — 80069 RENAL FUNCTION PANEL: CPT | Performed by: INTERNAL MEDICINE

## 2024-08-15 PROCEDURE — 85025 COMPLETE CBC W/AUTO DIFF WBC: CPT | Performed by: INTERNAL MEDICINE

## 2024-08-20 PROBLEM — N18.32 STAGE 3B CHRONIC KIDNEY DISEASE: Status: ACTIVE | Noted: 2024-04-14

## 2024-08-20 NOTE — PROGRESS NOTES
[facility]  [address] [facility], [address]  [place]  [phone]    PCP: [facility]  Oncologist: Raheem  Urologist: Theodora      HPI: Ramiro Ca is a 59 y.o. male with HTN, T2DM, and metastatic prostate cancer (s/p prostatectomy) who is here for established patient evaluation of Chronic Kidney Disease  Initial visit 4/16/24. His Cr was 1.0-1.2 from [id number]. He had ARYA with Cr up to 6.5 on 12/4/23 during an ED visit for /126. His Cr improved to 2.0 by 1/17/24 though dae to 2.8 from 1/18-1/20/24 during a hospitalization for hypoglycemia. His Cr is 2.4 today. No proteinuria.   He was diagnosed with HTN > 20 years ago. No hospitalizations for hypertensive emergency. His BP is currently well-controlled; no edema.   He was diagnosed with T2DM over the holidays. Glucose is currently well-controlled.   He denies h/o kidney stones or gout. He reports previously taking ibuprofen 1-2x/day. No family h/o kidney disease.   He is on Abiraterone for his prostate cancer.   His K is 3.4 today. Chart review reveals intermittent hypoK since 2021. He reports good appetite.      4/16/24: started KCl 10meq daily.     Interval Hx:   LV 5/15/24: decreased KCl from QD to QOD.  Today he reports to be doing well. He reports compliance with olmesartan QD and KCl QOD. BP controlled. No leg swelling. No h/o UTIs. Not interested in starting SGLT2i at this time (too many pills).         ROS:  Complete ROS otherwise negative except as indicated above.         Current Outpatient Medications:     abiraterone (ZYTIGA) 500 mg Tab, Take 1,000 mg by mouth once daily., Disp: , Rfl:     ammonium lactate 12 % Crea, Apply 1 Tube topically as needed., Disp: , Rfl:     glipiZIDE (GLUCOTROL) 10 MG TR24, Take 10 mg by mouth daily with breakfast., Disp: , Rfl:     HYDROcodone-acetaminophen (NORCO) 7.5-325 mg per tablet, Take 1 tablet by mouth every 6 (six) hours as needed., Disp: , Rfl:     olmesartan (BENICAR) 20 MG  "tablet, Take 20 mg by mouth once daily., Disp: , Rfl:     potassium chloride (MICRO-K) 10 MEQ CpSR, Take 1 capsule (10 mEq total) by mouth once daily., Disp: 30 capsule, Rfl: 11    tamsulosin (FLOMAX) 0.4 mg Cap, Take 0.4 mg by mouth once daily., Disp: , Rfl:     TRUE METRIX GLUCOSE METER Misc, USE TO CHECK BLOOD SUGAR DAILY AT HOME, Disp: , Rfl:     TRUE METRIX GLUCOSE TEST STRIP Strp, SMARTSIG:Via Meter Daily, Disp: , Rfl:     ULTRA THIN LANCETS 30 gauge Misc, USE TO CHECK BLOOD GLUCOSE EVERY DAY, Disp: , Rfl:     albuterol (PROVENTIL/VENTOLIN HFA) 90 mcg/actuation inhaler, SMARTSI-2 Puff(s) By Mouth 3 Times Daily PRN (Patient not taking: Reported on 2024), Disp: , Rfl:     amLODIPine (NORVASC) 10 MG tablet, Take 1 tablet (10 mg total) by mouth once daily. (Patient not taking: Reported on 2024), Disp: 30 tablet, Rfl: 6    fluticasone propionate (FLONASE) 50 mcg/actuation nasal spray, SMARTSI-2 Spray(s) Both Nares Daily, Disp: , Rfl:       Vitals: Blood pressure 128/84, pulse 81, resp. rate 18, height 6' 1" (1.854 m), weight 92.5 kg (204 lb 0.6 oz), SpO2 98%. Body mass index is 26.92 kg/m².    Physical Exam  Vitals reviewed.   Constitutional:       General: He is awake. He is not in acute distress.     Appearance: Normal appearance. He is well-developed.   HENT:      Head: Normocephalic and atraumatic.      Nose: Nose normal.      Mouth/Throat:      Mouth: Mucous membranes are moist.   Eyes:      Extraocular Movements: Extraocular movements intact.      Conjunctiva/sclera: Conjunctivae normal.   Pulmonary:      Effort: Pulmonary effort is normal.   Musculoskeletal:         General: No tenderness or signs of injury.      Right lower leg: No edema.      Left lower leg: No edema.   Skin:     General: Skin is warm and dry.      Findings: No erythema or rash.   Neurological:      General: No focal deficit present.      Mental Status: He is alert. Mental status is at baseline.   Psychiatric:         Mood " and Affect: Mood normal.         Behavior: Behavior normal.           Labs/Imaging:  Sodium   Date Value Ref Range Status   08/15/2024 140 136 - 145 mmol/L Final   06/03/2024 138 135 - 146 mmol/L Final   05/09/2024 136 136 - 145 mmol/L Final   04/16/2024 139 135 - 146 mmol/L Final   04/12/2024 138 136 - 145 mmol/L Final     Potassium   Date Value Ref Range Status   08/15/2024 4.2 3.5 - 5.1 mmol/L Final   06/03/2024 4.5 3.5 - 5.3 mmol/L Final   05/09/2024 4.8 3.5 - 5.1 mmol/L Final   04/16/2024 4.0 3.5 - 5.3 mmol/L Final   04/12/2024 3.4 (L) 3.5 - 5.1 mmol/L Final     Chloride   Date Value Ref Range Status   08/15/2024 106 95 - 110 mmol/L Final   05/09/2024 107 95 - 110 mmol/L Final   04/12/2024 108 95 - 110 mmol/L Final     CO2   Date Value Ref Range Status   08/15/2024 24 23 - 29 mmol/L Final   05/09/2024 22 (L) 23 - 29 mmol/L Final   04/12/2024 28 23 - 29 mmol/L Final     Carbon Dioxide   Date Value Ref Range Status   06/03/2024 23 20 - 32 mmol/L Final   04/16/2024 27 20 - 32 mmol/L Final     BUN   Date Value Ref Range Status   08/15/2024 26 (H) 6 - 20 mg/dL Final   05/09/2024 44 (H) 6 - 20 mg/dL Final   04/12/2024 29 (H) 6 - 20 mg/dL Final     Blood Urea Nitrogen   Date Value Ref Range Status   06/03/2024 29 (H) 7 - 25 mg/dL Final   04/16/2024 28 (H) 7 - 25 mg/dL Final     Creatinine   Date Value Ref Range Status   08/15/2024 1.8 (H) 0.5 - 1.4 mg/dL Final   06/03/2024 1.96 (H) 0.70 - 1.30 mg/dL Final   05/09/2024 2.4 (H) 0.5 - 1.4 mg/dL Final   04/16/2024 2.09 (H) 0.70 - 1.30 mg/dL Final   04/12/2024 2.4 (H) 0.5 - 1.4 mg/dL Final     eGFR   Date Value Ref Range Status   08/15/2024 43 (A) >60 mL/min/1.73 m^2 Final   06/03/2024 39 (L) > OR = 60 mL/min/1.73m2 Final   05/09/2024 30 (A) >60 mL/min/1.73 m^2 Final   04/16/2024 36 (L) > OR = 60 mL/min/1.73m2 Final   04/12/2024 30 (A) >60 mL/min/1.73 m^2 Final     Glucose   Date Value Ref Range Status   06/03/2021 118 (H) 65 - 99 mg/dL Final     Calcium   Date Value Ref  Range Status   08/15/2024 9.4 8.7 - 10.5 mg/dL Final   06/03/2024 9.4 8.6 - 10.3 mg/dL Final   05/09/2024 9.4 8.7 - 10.5 mg/dL Final   04/16/2024 9.3 8.6 - 10.3 mg/dL Final   04/12/2024 9.5 8.7 - 10.5 mg/dL Final     Phosphorus   Date Value Ref Range Status   08/15/2024 3.3 2.7 - 4.5 mg/dL Final   05/09/2024 3.5 2.7 - 4.5 mg/dL Final   04/12/2024 3.4 2.7 - 4.5 mg/dL Final     Albumin   Date Value Ref Range Status   08/15/2024 4.0 3.5 - 5.2 g/dL Final   05/09/2024 4.1 3.5 - 5.2 g/dL Final   04/12/2024 4.1 3.5 - 5.2 g/dL Final     Albumin Level   Date Value Ref Range Status   06/03/2024 4.5 3.6 - 5.1 g/dL Final   04/16/2024 4.7 3.6 - 5.1 g/dL Final       PTH, Intact   Date Value Ref Range Status   08/15/2024 51.4 9.0 - 77.0 pg/mL Final   05/09/2024 56.9 9.0 - 77.0 pg/mL Final   04/12/2024 90.4 (H) 9.0 - 77.0 pg/mL Final     Uric Acid   Date Value Ref Range Status   08/15/2024 4.2 3.4 - 7.0 mg/dL Final   05/09/2024 5.1 3.4 - 7.0 mg/dL Final   04/12/2024 5.4 3.4 - 7.0 mg/dL Final       Hemoglobin   Date Value Ref Range Status   08/15/2024 11.7 (L) 14.0 - 18.0 g/dL Final   05/09/2024 11.2 (L) 14.0 - 18.0 g/dL Final   04/12/2024 10.4 (L) 14.0 - 18.0 g/dL Final       Prot/Creat Ratio, Urine   Date Value Ref Range Status   08/15/2024 0.12 0.00 - 0.20 Final   05/09/2024 0.11 0.00 - 0.20 Final   04/12/2024 0.16 0.00 - 0.20 Final           Renal US: 5/9/24:   Right kidney: The right kidney measures 11 cm. No cortical thinning. No loss of corticomedullary distinction. Resistive index measures 0.54.  No mass. No renal stone. There is mild hydronephrosis.     Left kidney: The left kidney measures 13 cm. No cortical thinning. No loss of corticomedullary distinction. Resistive index measures 0.57.  No mass. No renal stone. There is mild hydronephrosis.      2/29/24:   The kidneys are normal in size and echotexture.   The right kidney measures 12.9 cm.   The left kidney measures 14.2 cm.      There is severe bilateral hydronephrosis  with parenchymal volume loss, right greater than left.   No stones are seen.     Impression/Plan:    Stage 3b chronic kidney disease  - baseline Cr unknown. No labs from [id number] (previously 1.0-1.2 in 2021)  - Cr 6.5 on 12/4/23; associated with BP 220s/120s. Cr improved to 2.0 in 1/2024 but later dae to 2.8. Renal US with severe bilateral hydronephrosis. He underwent prostate surgery with improvement.  - Cr 2.4 --> 2.4 --> 1.8 today; improved. GFR now > 30%  - UPCR 0.12; negative. Continue olmesartan.  - offered SGLT2i; patient not interested at this time  - continue good water intake. Low salt diet.     Hypokalemia  - K 4.2; controlled on KCl 10meq QOD. Continue.    Anemia of chronic renal failure  - hgb 11.2 --> 11.7; improved and at goal for CKD    Secondary renal hyperparathyroidism  - PTH 57 --> 51; controlled. CCa and phos normal.    Essential hypertension  - controlled; continue RAAS blockade       Visit today included increased complexity associated with the care of the episodic problem hypokalemia addressed and managing the longitudinal care of the patient due to the serious and/or complex managed problem(s) CKD.         Treatment options and plan were discussed with the patient and/or caregiver.   RTC 4-6 months.       Marie Downs MD  [facility]  [phone]

## 2024-08-22 ENCOUNTER — OFFICE VISIT (OUTPATIENT)
Dept: NEPHROLOGY | Facility: CLINIC | Age: 59
End: 2024-08-22
Payer: MEDICAID

## 2024-08-22 VITALS
BODY MASS INDEX: 27.04 KG/M2 | DIASTOLIC BLOOD PRESSURE: 84 MMHG | WEIGHT: 204.06 LBS | HEIGHT: 73 IN | RESPIRATION RATE: 18 BRPM | HEART RATE: 81 BPM | SYSTOLIC BLOOD PRESSURE: 128 MMHG | OXYGEN SATURATION: 98 %

## 2024-08-22 DIAGNOSIS — E87.6 HYPOKALEMIA: ICD-10-CM

## 2024-08-22 DIAGNOSIS — D63.1 ANEMIA OF CHRONIC RENAL FAILURE, UNSPECIFIED CKD STAGE: ICD-10-CM

## 2024-08-22 DIAGNOSIS — N18.32 STAGE 3B CHRONIC KIDNEY DISEASE: Primary | ICD-10-CM

## 2024-08-22 DIAGNOSIS — I10 ESSENTIAL HYPERTENSION: ICD-10-CM

## 2024-08-22 DIAGNOSIS — N18.9 ANEMIA OF CHRONIC RENAL FAILURE, UNSPECIFIED CKD STAGE: ICD-10-CM

## 2024-08-22 DIAGNOSIS — N25.81 SECONDARY RENAL HYPERPARATHYROIDISM: ICD-10-CM

## 2024-08-22 PROCEDURE — 99214 OFFICE O/P EST MOD 30 MIN: CPT | Mod: PBBFAC | Performed by: INTERNAL MEDICINE

## 2024-08-22 PROCEDURE — 99999 PR PBB SHADOW E&M-EST. PATIENT-LVL IV: CPT | Mod: PBBFAC,,, | Performed by: INTERNAL MEDICINE

## 2024-08-22 NOTE — ASSESSMENT & PLAN NOTE
- baseline Cr unknown. No labs from [id number] (previously 1.0-1.2 in 2021)  - Cr 6.5 on 12/4/23; associated with BP 220s/120s. Cr improved to 2.0 in 1/2024 but later dae to 2.8. Renal US with severe bilateral hydronephrosis. He underwent prostate surgery with improvement.  - Cr 2.4 --> 2.4 --> 1.8 today; improved. GFR now > 30%  - UPCR 0.12; negative. Continue olmesartan.  - offered SGLT2i; patient not interested at this time  - continue good water intake. Low salt diet.